# Patient Record
Sex: FEMALE | Race: WHITE | NOT HISPANIC OR LATINO | Employment: FULL TIME | URBAN - METROPOLITAN AREA
[De-identification: names, ages, dates, MRNs, and addresses within clinical notes are randomized per-mention and may not be internally consistent; named-entity substitution may affect disease eponyms.]

---

## 2017-01-11 ENCOUNTER — AMBULATORY - HEALTHEAST (OUTPATIENT)
Dept: ALLERGY | Facility: CLINIC | Age: 37
End: 2017-01-11

## 2017-01-11 DIAGNOSIS — J30.2 SEASONAL ALLERGIC RHINITIS, UNSPECIFIED ALLERGIC RHINITIS TRIGGER: ICD-10-CM

## 2017-01-12 ENCOUNTER — AMBULATORY - HEALTHEAST (OUTPATIENT)
Dept: ALLERGY | Facility: CLINIC | Age: 37
End: 2017-01-12

## 2017-01-12 DIAGNOSIS — J30.2 SEASONAL ALLERGIC RHINITIS, UNSPECIFIED ALLERGIC RHINITIS TRIGGER: ICD-10-CM

## 2017-02-15 ENCOUNTER — AMBULATORY - HEALTHEAST (OUTPATIENT)
Dept: ALLERGY | Facility: CLINIC | Age: 37
End: 2017-02-15

## 2017-02-15 DIAGNOSIS — J30.2 SEASONAL ALLERGIC RHINITIS, UNSPECIFIED ALLERGIC RHINITIS TRIGGER: ICD-10-CM

## 2017-03-01 ENCOUNTER — AMBULATORY - HEALTHEAST (OUTPATIENT)
Dept: ALLERGY | Facility: CLINIC | Age: 37
End: 2017-03-01

## 2017-03-01 DIAGNOSIS — J30.2 SEASONAL ALLERGIC RHINITIS, UNSPECIFIED ALLERGIC RHINITIS TRIGGER: ICD-10-CM

## 2017-03-13 ENCOUNTER — OFFICE VISIT - HEALTHEAST (OUTPATIENT)
Dept: ALLERGY | Facility: CLINIC | Age: 37
End: 2017-03-13

## 2017-03-13 ENCOUNTER — RECORDS - HEALTHEAST (OUTPATIENT)
Dept: ADMINISTRATIVE | Facility: OTHER | Age: 37
End: 2017-03-13

## 2017-03-13 DIAGNOSIS — J30.2 SEASONAL ALLERGIC RHINITIS, UNSPECIFIED ALLERGIC RHINITIS TRIGGER: ICD-10-CM

## 2017-03-13 DIAGNOSIS — J45.20 MILD INTERMITTENT ASTHMA WITHOUT COMPLICATION: ICD-10-CM

## 2017-04-04 ENCOUNTER — COMMUNICATION - HEALTHEAST (OUTPATIENT)
Dept: FAMILY MEDICINE | Facility: CLINIC | Age: 37
End: 2017-04-04

## 2017-05-01 ENCOUNTER — AMBULATORY - HEALTHEAST (OUTPATIENT)
Dept: ALLERGY | Facility: CLINIC | Age: 37
End: 2017-05-01

## 2017-05-01 DIAGNOSIS — J30.2 SEASONAL ALLERGIC RHINITIS, UNSPECIFIED ALLERGIC RHINITIS TRIGGER: ICD-10-CM

## 2017-05-15 ENCOUNTER — AMBULATORY - HEALTHEAST (OUTPATIENT)
Dept: ALLERGY | Facility: CLINIC | Age: 37
End: 2017-05-15

## 2017-05-15 ENCOUNTER — COMMUNICATION - HEALTHEAST (OUTPATIENT)
Dept: FAMILY MEDICINE | Facility: CLINIC | Age: 37
End: 2017-05-15

## 2017-05-15 DIAGNOSIS — Z31.89 ENCOUNTER FOR FERTILITY PLANNING: ICD-10-CM

## 2017-05-15 DIAGNOSIS — J30.2 SEASONAL ALLERGIC RHINITIS, UNSPECIFIED ALLERGIC RHINITIS TRIGGER: ICD-10-CM

## 2017-06-05 ENCOUNTER — AMBULATORY - HEALTHEAST (OUTPATIENT)
Dept: ALLERGY | Facility: CLINIC | Age: 37
End: 2017-06-05

## 2017-06-05 DIAGNOSIS — J30.2 SEASONAL ALLERGIC RHINITIS, UNSPECIFIED ALLERGIC RHINITIS TRIGGER: ICD-10-CM

## 2017-07-19 ENCOUNTER — AMBULATORY - HEALTHEAST (OUTPATIENT)
Dept: ALLERGY | Facility: CLINIC | Age: 37
End: 2017-07-19

## 2017-07-19 DIAGNOSIS — J30.2 SEASONAL ALLERGIC RHINITIS, UNSPECIFIED ALLERGIC RHINITIS TRIGGER: ICD-10-CM

## 2017-08-02 ENCOUNTER — AMBULATORY - HEALTHEAST (OUTPATIENT)
Dept: ALLERGY | Facility: CLINIC | Age: 37
End: 2017-08-02

## 2017-08-02 DIAGNOSIS — J30.2 SEASONAL ALLERGIC RHINITIS, UNSPECIFIED ALLERGIC RHINITIS TRIGGER: ICD-10-CM

## 2017-08-14 ENCOUNTER — COMMUNICATION - HEALTHEAST (OUTPATIENT)
Dept: FAMILY MEDICINE | Facility: CLINIC | Age: 37
End: 2017-08-14

## 2017-08-15 ENCOUNTER — RECORDS - HEALTHEAST (OUTPATIENT)
Dept: ADMINISTRATIVE | Facility: OTHER | Age: 37
End: 2017-08-15

## 2017-08-16 ENCOUNTER — COMMUNICATION - HEALTHEAST (OUTPATIENT)
Dept: FAMILY MEDICINE | Facility: CLINIC | Age: 37
End: 2017-08-16

## 2017-08-23 ENCOUNTER — OFFICE VISIT - HEALTHEAST (OUTPATIENT)
Dept: FAMILY MEDICINE | Facility: CLINIC | Age: 37
End: 2017-08-23

## 2017-08-23 DIAGNOSIS — Z13.1 DIABETES MELLITUS SCREENING: ICD-10-CM

## 2017-08-23 DIAGNOSIS — Z98.84 H/O GASTRIC BYPASS: ICD-10-CM

## 2017-08-23 DIAGNOSIS — F41.1 GENERALIZED ANXIETY DISORDER: ICD-10-CM

## 2017-08-23 DIAGNOSIS — Z00.00 ROUTINE GENERAL MEDICAL EXAMINATION AT A HEALTH CARE FACILITY: ICD-10-CM

## 2017-08-23 DIAGNOSIS — Z31.41 FERTILITY TESTING: ICD-10-CM

## 2017-08-23 DIAGNOSIS — Z13.220 SCREENING CHOLESTEROL LEVEL: ICD-10-CM

## 2017-08-23 DIAGNOSIS — Z23 ENCOUNTER FOR VACCINATION: ICD-10-CM

## 2017-08-23 DIAGNOSIS — Z12.4 CERVICAL CANCER SCREENING: ICD-10-CM

## 2017-08-23 LAB
CHOLEST SERPL-MCNC: 163 MG/DL
FASTING STATUS PATIENT QL REPORTED: YES
HDLC SERPL-MCNC: 75 MG/DL
LDLC SERPL CALC-MCNC: 77 MG/DL
TRIGL SERPL-MCNC: 56 MG/DL

## 2017-08-23 ASSESSMENT — MIFFLIN-ST. JEOR: SCORE: 1641.88

## 2017-08-28 LAB
HPV INTERPRETATION - HISTORICAL: NORMAL
HPV INTERPRETER - HISTORICAL: NORMAL

## 2017-08-30 ENCOUNTER — AMBULATORY - HEALTHEAST (OUTPATIENT)
Dept: ALLERGY | Facility: CLINIC | Age: 37
End: 2017-08-30

## 2017-08-30 DIAGNOSIS — J30.2 SEASONAL ALLERGIC RHINITIS, UNSPECIFIED ALLERGIC RHINITIS TRIGGER: ICD-10-CM

## 2017-08-31 LAB
BKR LAB AP ABNORMAL BLEEDING: NO
BKR LAB AP BIRTH CONTROL/HORMONES: NORMAL
BKR LAB AP CERVICAL APPEARANCE: NORMAL
BKR LAB AP GYN ADEQUACY: NORMAL
BKR LAB AP GYN INTERPRETATION: NORMAL
BKR LAB AP HPV REFLEX: NORMAL
BKR LAB AP LMP: NORMAL
BKR LAB AP PATIENT STATUS: NORMAL
BKR LAB AP PREVIOUS ABNORMAL: NORMAL
BKR LAB AP PREVIOUS NORMAL: 2013
HIGH RISK?: NO
PATH REPORT.COMMENTS IMP SPEC: NORMAL
RESULT FLAG (HE HISTORICAL CONVERSION): NORMAL

## 2017-09-28 ENCOUNTER — RECORDS - HEALTHEAST (OUTPATIENT)
Dept: MAMMOGRAPHY | Facility: CLINIC | Age: 37
End: 2017-09-28

## 2017-09-28 DIAGNOSIS — Z12.31 ENCOUNTER FOR SCREENING MAMMOGRAM FOR MALIGNANT NEOPLASM OF BREAST: ICD-10-CM

## 2017-10-09 ENCOUNTER — HOSPITAL ENCOUNTER (OUTPATIENT)
Dept: MAMMOGRAPHY | Facility: HOSPITAL | Age: 37
Discharge: HOME | End: 2017-10-09
Attending: FAMILY MEDICINE

## 2017-10-09 DIAGNOSIS — R92.8 ABNORMAL SCREENING MAMMOGRAM: ICD-10-CM

## 2017-10-11 ENCOUNTER — AMBULATORY - HEALTHEAST (OUTPATIENT)
Dept: ALLERGY | Facility: CLINIC | Age: 37
End: 2017-10-11

## 2017-10-11 ENCOUNTER — COMMUNICATION - HEALTHEAST (OUTPATIENT)
Dept: ADMINISTRATIVE | Facility: CLINIC | Age: 37
End: 2017-10-11

## 2017-10-11 DIAGNOSIS — J30.2 CHRONIC SEASONAL ALLERGIC RHINITIS, UNSPECIFIED TRIGGER: ICD-10-CM

## 2017-10-19 ENCOUNTER — AMBULATORY - HEALTHEAST (OUTPATIENT)
Dept: ALLERGY | Facility: CLINIC | Age: 37
End: 2017-10-19

## 2017-10-19 DIAGNOSIS — J30.2 CHRONIC SEASONAL ALLERGIC RHINITIS, UNSPECIFIED TRIGGER: ICD-10-CM

## 2017-10-30 ENCOUNTER — COMMUNICATION - HEALTHEAST (OUTPATIENT)
Dept: FAMILY MEDICINE | Facility: CLINIC | Age: 37
End: 2017-10-30

## 2017-10-30 DIAGNOSIS — Z34.90 PREGNANCY: ICD-10-CM

## 2017-11-01 ENCOUNTER — AMBULATORY - HEALTHEAST (OUTPATIENT)
Dept: ALLERGY | Facility: CLINIC | Age: 37
End: 2017-11-01

## 2017-11-01 ENCOUNTER — AMBULATORY - HEALTHEAST (OUTPATIENT)
Dept: LAB | Facility: CLINIC | Age: 37
End: 2017-11-01

## 2017-11-01 DIAGNOSIS — J30.2 CHRONIC SEASONAL ALLERGIC RHINITIS, UNSPECIFIED TRIGGER: ICD-10-CM

## 2017-11-01 DIAGNOSIS — Z34.90 PREGNANCY: ICD-10-CM

## 2017-11-02 ENCOUNTER — COMMUNICATION - HEALTHEAST (OUTPATIENT)
Dept: FAMILY MEDICINE | Facility: CLINIC | Age: 37
End: 2017-11-02

## 2017-11-15 ENCOUNTER — AMBULATORY - HEALTHEAST (OUTPATIENT)
Dept: ALLERGY | Facility: CLINIC | Age: 37
End: 2017-11-15

## 2017-11-15 DIAGNOSIS — J30.2 CHRONIC SEASONAL ALLERGIC RHINITIS, UNSPECIFIED TRIGGER: ICD-10-CM

## 2017-11-29 ENCOUNTER — AMBULATORY - HEALTHEAST (OUTPATIENT)
Dept: ALLERGY | Facility: CLINIC | Age: 37
End: 2017-11-29

## 2017-11-29 DIAGNOSIS — J30.2 CHRONIC SEASONAL ALLERGIC RHINITIS, UNSPECIFIED TRIGGER: ICD-10-CM

## 2017-12-28 ENCOUNTER — COMMUNICATION - HEALTHEAST (OUTPATIENT)
Dept: FAMILY MEDICINE | Facility: CLINIC | Age: 37
End: 2017-12-28

## 2017-12-28 DIAGNOSIS — F41.1 GENERALIZED ANXIETY DISORDER: ICD-10-CM

## 2018-01-03 ENCOUNTER — AMBULATORY - HEALTHEAST (OUTPATIENT)
Dept: ALLERGY | Facility: CLINIC | Age: 38
End: 2018-01-03

## 2018-01-03 DIAGNOSIS — J30.2 CHRONIC SEASONAL ALLERGIC RHINITIS, UNSPECIFIED TRIGGER: ICD-10-CM

## 2018-01-29 ENCOUNTER — AMBULATORY - HEALTHEAST (OUTPATIENT)
Dept: ALLERGY | Facility: CLINIC | Age: 38
End: 2018-01-29

## 2018-01-29 DIAGNOSIS — J30.2 CHRONIC SEASONAL ALLERGIC RHINITIS, UNSPECIFIED TRIGGER: ICD-10-CM

## 2018-03-12 ENCOUNTER — OFFICE VISIT - HEALTHEAST (OUTPATIENT)
Dept: ALLERGY | Facility: CLINIC | Age: 38
End: 2018-03-12

## 2018-03-12 DIAGNOSIS — J30.2 CHRONIC SEASONAL ALLERGIC RHINITIS, UNSPECIFIED TRIGGER: ICD-10-CM

## 2018-03-21 ENCOUNTER — AMBULATORY - HEALTHEAST (OUTPATIENT)
Dept: ALLERGY | Facility: CLINIC | Age: 38
End: 2018-03-21

## 2018-03-21 DIAGNOSIS — J30.2 CHRONIC SEASONAL ALLERGIC RHINITIS, UNSPECIFIED TRIGGER: ICD-10-CM

## 2018-08-10 ENCOUNTER — COMMUNICATION - HEALTHEAST (OUTPATIENT)
Dept: FAMILY MEDICINE | Facility: CLINIC | Age: 38
End: 2018-08-10

## 2018-08-10 DIAGNOSIS — F41.1 GENERALIZED ANXIETY DISORDER: ICD-10-CM

## 2018-10-25 ENCOUNTER — OFFICE VISIT - HEALTHEAST (OUTPATIENT)
Dept: FAMILY MEDICINE | Facility: CLINIC | Age: 38
End: 2018-10-25

## 2018-10-25 DIAGNOSIS — Z12.11 COLON CANCER SCREENING: ICD-10-CM

## 2018-10-25 DIAGNOSIS — F41.1 GENERALIZED ANXIETY DISORDER: ICD-10-CM

## 2018-10-25 DIAGNOSIS — F32.0 MILD MAJOR DEPRESSION (H): ICD-10-CM

## 2018-10-25 DIAGNOSIS — M79.675 PAIN OF TOE OF LEFT FOOT: ICD-10-CM

## 2018-10-25 DIAGNOSIS — J45.20 MILD INTERMITTENT ASTHMA WITHOUT COMPLICATION: ICD-10-CM

## 2018-10-25 DIAGNOSIS — Z12.31 VISIT FOR SCREENING MAMMOGRAM: ICD-10-CM

## 2018-11-06 ENCOUNTER — COMMUNICATION - HEALTHEAST (OUTPATIENT)
Dept: FAMILY MEDICINE | Facility: CLINIC | Age: 38
End: 2018-11-06

## 2018-11-06 DIAGNOSIS — F41.1 GENERALIZED ANXIETY DISORDER: ICD-10-CM

## 2018-12-26 ENCOUNTER — COMMUNICATION - HEALTHEAST (OUTPATIENT)
Dept: ADMINISTRATIVE | Facility: CLINIC | Age: 38
End: 2018-12-26

## 2019-01-07 ENCOUNTER — RECORDS - HEALTHEAST (OUTPATIENT)
Dept: GENERAL RADIOLOGY | Facility: CLINIC | Age: 39
End: 2019-01-07

## 2019-01-07 ENCOUNTER — OFFICE VISIT - HEALTHEAST (OUTPATIENT)
Dept: PODIATRY | Facility: CLINIC | Age: 39
End: 2019-01-07

## 2019-01-07 DIAGNOSIS — M79.672 LEFT FOOT PAIN: ICD-10-CM

## 2019-01-07 DIAGNOSIS — M79.672 PAIN IN LEFT FOOT: ICD-10-CM

## 2019-01-07 DIAGNOSIS — M89.30 HYPERTROPHY OF BONE: ICD-10-CM

## 2019-01-07 ASSESSMENT — MIFFLIN-ST. JEOR: SCORE: 1661.73

## 2019-01-29 ENCOUNTER — AMBULATORY - HEALTHEAST (OUTPATIENT)
Dept: VASCULAR SURGERY | Facility: CLINIC | Age: 39
End: 2019-01-29

## 2019-01-31 ENCOUNTER — COMMUNICATION - HEALTHEAST (OUTPATIENT)
Dept: ADMINISTRATIVE | Facility: CLINIC | Age: 39
End: 2019-01-31

## 2019-02-05 ENCOUNTER — OFFICE VISIT - HEALTHEAST (OUTPATIENT)
Dept: PODIATRY | Facility: CLINIC | Age: 39
End: 2019-02-05

## 2019-02-05 ENCOUNTER — HOSPITAL ENCOUNTER (OUTPATIENT)
Dept: MAMMOGRAPHY | Facility: CLINIC | Age: 39
Discharge: HOME | End: 2019-02-05
Attending: FAMILY MEDICINE

## 2019-02-05 DIAGNOSIS — L03.116 CELLULITIS OF LEFT FOOT: ICD-10-CM

## 2019-02-05 DIAGNOSIS — Z12.31 VISIT FOR SCREENING MAMMOGRAM: ICD-10-CM

## 2019-02-05 ASSESSMENT — MIFFLIN-ST. JEOR: SCORE: 1659.46

## 2019-03-01 ENCOUNTER — COMMUNICATION - HEALTHEAST (OUTPATIENT)
Dept: VASCULAR SURGERY | Facility: CLINIC | Age: 39
End: 2019-03-01

## 2019-03-25 ENCOUNTER — COMMUNICATION - HEALTHEAST (OUTPATIENT)
Dept: PODIATRY | Facility: CLINIC | Age: 39
End: 2019-03-25

## 2019-03-27 ENCOUNTER — OFFICE VISIT - HEALTHEAST (OUTPATIENT)
Dept: PODIATRY | Facility: CLINIC | Age: 39
End: 2019-03-27

## 2019-03-27 DIAGNOSIS — L02.612 ABSCESS OF LEFT FOOT: ICD-10-CM

## 2019-03-27 ASSESSMENT — MIFFLIN-ST. JEOR: SCORE: 1632.24

## 2019-03-29 LAB
BACTERIA SPEC CULT: NORMAL
GRAM STAIN RESULT: NORMAL

## 2019-04-03 ENCOUNTER — OFFICE VISIT - HEALTHEAST (OUTPATIENT)
Dept: PODIATRY | Facility: CLINIC | Age: 39
End: 2019-04-03

## 2019-04-03 DIAGNOSIS — L02.612 ABSCESS OF LEFT FOOT: ICD-10-CM

## 2019-04-03 ASSESSMENT — MIFFLIN-ST. JEOR: SCORE: 1632.24

## 2019-08-19 ENCOUNTER — OFFICE VISIT - HEALTHEAST (OUTPATIENT)
Dept: FAMILY MEDICINE | Facility: CLINIC | Age: 39
End: 2019-08-19

## 2019-08-19 DIAGNOSIS — F32.0 MILD MAJOR DEPRESSION (H): ICD-10-CM

## 2019-08-19 DIAGNOSIS — Z00.00 ROUTINE GENERAL MEDICAL EXAMINATION AT A HEALTH CARE FACILITY: ICD-10-CM

## 2019-08-19 DIAGNOSIS — Z98.84 GASTRIC BYPASS STATUS FOR OBESITY: ICD-10-CM

## 2019-08-19 DIAGNOSIS — F41.1 GENERALIZED ANXIETY DISORDER: ICD-10-CM

## 2019-08-19 LAB
ALBUMIN UR-MCNC: NEGATIVE MG/DL
APPEARANCE UR: CLEAR
BILIRUB UR QL STRIP: NEGATIVE
CHOLEST SERPL-MCNC: 200 MG/DL
COLOR UR AUTO: YELLOW
ERYTHROCYTE [DISTWIDTH] IN BLOOD BY AUTOMATED COUNT: 12.3 % (ref 11–14.5)
FASTING STATUS PATIENT QL REPORTED: YES
FASTING STATUS PATIENT QL REPORTED: YES
FERRITIN SERPL-MCNC: 6 NG/ML (ref 10–130)
GLUCOSE BLD-MCNC: 105 MG/DL (ref 70–99)
GLUCOSE UR STRIP-MCNC: NEGATIVE MG/DL
HCG UR QL: NEGATIVE
HCT VFR BLD AUTO: 39.6 % (ref 35–47)
HDLC SERPL-MCNC: 94 MG/DL
HGB BLD-MCNC: 12.7 G/DL (ref 12–16)
HGB UR QL STRIP: NEGATIVE
KETONES UR STRIP-MCNC: NEGATIVE MG/DL
LDLC SERPL CALC-MCNC: 94 MG/DL
LEUKOCYTE ESTERASE UR QL STRIP: NEGATIVE
MCH RBC QN AUTO: 28.6 PG (ref 27–34)
MCHC RBC AUTO-ENTMCNC: 32 G/DL (ref 32–36)
MCV RBC AUTO: 89 FL (ref 80–100)
NITRATE UR QL: NEGATIVE
PH UR STRIP: 7 [PH] (ref 5–8)
PLATELET # BLD AUTO: 209 THOU/UL (ref 140–440)
PMV BLD AUTO: 8.5 FL (ref 7–10)
RBC # BLD AUTO: 4.44 MILL/UL (ref 3.8–5.4)
SP GR UR STRIP: 1.01 (ref 1–1.03)
TRIGL SERPL-MCNC: 59 MG/DL
TSH SERPL DL<=0.005 MIU/L-ACNC: 1.18 UIU/ML (ref 0.3–5)
UROBILINOGEN UR STRIP-ACNC: NORMAL
VIT B12 SERPL-MCNC: 176 PG/ML (ref 213–816)
WBC: 7.2 THOU/UL (ref 4–11)

## 2019-08-19 ASSESSMENT — MIFFLIN-ST. JEOR: SCORE: 1677.6

## 2019-08-20 LAB
25(OH)D3 SERPL-MCNC: 24.9 NG/ML (ref 30–80)
25(OH)D3 SERPL-MCNC: 24.9 NG/ML (ref 30–80)

## 2019-09-26 ENCOUNTER — COMMUNICATION - HEALTHEAST (OUTPATIENT)
Dept: FAMILY MEDICINE | Facility: CLINIC | Age: 39
End: 2019-09-26

## 2019-09-30 ENCOUNTER — OFFICE VISIT - HEALTHEAST (OUTPATIENT)
Dept: FAMILY MEDICINE | Facility: CLINIC | Age: 39
End: 2019-09-30

## 2019-09-30 DIAGNOSIS — F32.0 MILD MAJOR DEPRESSION (H): ICD-10-CM

## 2019-09-30 DIAGNOSIS — F41.1 GENERALIZED ANXIETY DISORDER: ICD-10-CM

## 2019-09-30 DIAGNOSIS — D51.9 ANEMIA DUE TO VITAMIN B12 DEFICIENCY, UNSPECIFIED B12 DEFICIENCY TYPE: ICD-10-CM

## 2019-09-30 LAB
ALBUMIN SERPL-MCNC: 3.9 G/DL (ref 3.5–5)
ALP SERPL-CCNC: 59 U/L (ref 45–120)
ALT SERPL W P-5'-P-CCNC: 38 U/L (ref 0–45)
ANION GAP SERPL CALCULATED.3IONS-SCNC: 9 MMOL/L (ref 5–18)
AST SERPL W P-5'-P-CCNC: 26 U/L (ref 0–40)
BILIRUB SERPL-MCNC: 0.3 MG/DL (ref 0–1)
BUN SERPL-MCNC: 10 MG/DL (ref 8–22)
CALCIUM SERPL-MCNC: 9.5 MG/DL (ref 8.5–10.5)
CHLORIDE BLD-SCNC: 106 MMOL/L (ref 98–107)
CO2 SERPL-SCNC: 24 MMOL/L (ref 22–31)
CREAT SERPL-MCNC: 0.82 MG/DL (ref 0.6–1.1)
ERYTHROCYTE [DISTWIDTH] IN BLOOD BY AUTOMATED COUNT: 12 % (ref 11–14.5)
FERRITIN SERPL-MCNC: 13 NG/ML (ref 10–130)
GFR SERPL CREATININE-BSD FRML MDRD: >60 ML/MIN/1.73M2
GLUCOSE BLD-MCNC: 94 MG/DL (ref 70–125)
HCT VFR BLD AUTO: 38.2 % (ref 35–47)
HGB BLD-MCNC: 12.5 G/DL (ref 12–16)
MCH RBC QN AUTO: 29.2 PG (ref 27–34)
MCHC RBC AUTO-ENTMCNC: 32.7 G/DL (ref 32–36)
MCV RBC AUTO: 89 FL (ref 80–100)
PLATELET # BLD AUTO: 217 THOU/UL (ref 140–440)
PMV BLD AUTO: 8.6 FL (ref 7–10)
POTASSIUM BLD-SCNC: 4.9 MMOL/L (ref 3.5–5)
PROT SERPL-MCNC: 6.9 G/DL (ref 6–8)
RBC # BLD AUTO: 4.29 MILL/UL (ref 3.8–5.4)
SODIUM SERPL-SCNC: 139 MMOL/L (ref 136–145)
WBC: 7.4 THOU/UL (ref 4–11)

## 2019-10-08 ENCOUNTER — COMMUNICATION - HEALTHEAST (OUTPATIENT)
Dept: ADMINISTRATIVE | Facility: HOSPITAL | Age: 39
End: 2019-10-08

## 2019-10-09 ENCOUNTER — COMMUNICATION - HEALTHEAST (OUTPATIENT)
Dept: ADMINISTRATIVE | Facility: HOSPITAL | Age: 39
End: 2019-10-09

## 2019-10-09 ENCOUNTER — AMBULATORY - HEALTHEAST (OUTPATIENT)
Dept: PODIATRY | Facility: CLINIC | Age: 39
End: 2019-10-09

## 2019-10-09 ENCOUNTER — SURGERY - HEALTHEAST (OUTPATIENT)
Dept: PODIATRY | Facility: CLINIC | Age: 39
End: 2019-10-09

## 2019-10-09 ENCOUNTER — COMMUNICATION - HEALTHEAST (OUTPATIENT)
Dept: PODIATRY | Facility: CLINIC | Age: 39
End: 2019-10-09

## 2019-10-09 DIAGNOSIS — M89.30 HYPERTROPHY OF BONE: ICD-10-CM

## 2019-10-11 ENCOUNTER — COMMUNICATION - HEALTHEAST (OUTPATIENT)
Dept: FAMILY MEDICINE | Facility: CLINIC | Age: 39
End: 2019-10-11

## 2019-10-11 DIAGNOSIS — F41.1 GENERALIZED ANXIETY DISORDER: ICD-10-CM

## 2019-10-11 DIAGNOSIS — F32.0 MILD MAJOR DEPRESSION (H): ICD-10-CM

## 2019-10-28 ENCOUNTER — COMMUNICATION - HEALTHEAST (OUTPATIENT)
Dept: ADMINISTRATIVE | Facility: CLINIC | Age: 39
End: 2019-10-28

## 2019-10-28 ENCOUNTER — COMMUNICATION - HEALTHEAST (OUTPATIENT)
Dept: VASCULAR SURGERY | Facility: CLINIC | Age: 39
End: 2019-10-28

## 2019-10-28 DIAGNOSIS — Z12.11 SPECIAL SCREENING FOR MALIGNANT NEOPLASMS, COLON: ICD-10-CM

## 2019-12-20 ENCOUNTER — RECORDS - HEALTHEAST (OUTPATIENT)
Dept: GENERAL RADIOLOGY | Facility: CLINIC | Age: 39
End: 2019-12-20

## 2019-12-20 ENCOUNTER — OFFICE VISIT - HEALTHEAST (OUTPATIENT)
Dept: FAMILY MEDICINE | Facility: CLINIC | Age: 39
End: 2019-12-20

## 2019-12-20 DIAGNOSIS — S00.83XA: ICD-10-CM
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
## 2019-12-20 DIAGNOSIS — S01.25XA: ICD-10-CM

## 2019-12-20 DIAGNOSIS — S63.501A SPRAIN OF RIGHT WRIST, INITIAL ENCOUNTER: ICD-10-CM

## 2019-12-20 DIAGNOSIS — S69.91XA UNSPECIFIED INJURY OF RIGHT WRIST, HAND AND FINGER(S), INITIAL ENCOUNTER: ICD-10-CM

## 2020-01-16 ENCOUNTER — COMMUNICATION - HEALTHEAST (OUTPATIENT)
Dept: FAMILY MEDICINE | Facility: CLINIC | Age: 40
End: 2020-01-16

## 2020-01-16 DIAGNOSIS — F32.0 MILD MAJOR DEPRESSION (H): ICD-10-CM

## 2020-01-16 DIAGNOSIS — F41.1 GENERALIZED ANXIETY DISORDER: ICD-10-CM

## 2020-08-04 ENCOUNTER — OFFICE VISIT - HEALTHEAST (OUTPATIENT)
Dept: FAMILY MEDICINE | Facility: CLINIC | Age: 40
End: 2020-08-04

## 2020-08-04 DIAGNOSIS — F41.1 GENERALIZED ANXIETY DISORDER: ICD-10-CM

## 2020-08-04 DIAGNOSIS — F32.0 MILD MAJOR DEPRESSION (H): ICD-10-CM

## 2020-08-04 ASSESSMENT — ANXIETY QUESTIONNAIRES
GAD7 TOTAL SCORE: 11
IF YOU CHECKED OFF ANY PROBLEMS ON THIS QUESTIONNAIRE, HOW DIFFICULT HAVE THESE PROBLEMS MADE IT FOR YOU TO DO YOUR WORK, TAKE CARE OF THINGS AT HOME, OR GET ALONG WITH OTHER PEOPLE: VERY DIFFICULT
4. TROUBLE RELAXING: MORE THAN HALF THE DAYS
5. BEING SO RESTLESS THAT IT IS HARD TO SIT STILL: NOT AT ALL
2. NOT BEING ABLE TO STOP OR CONTROL WORRYING: SEVERAL DAYS
1. FEELING NERVOUS, ANXIOUS, OR ON EDGE: MORE THAN HALF THE DAYS
3. WORRYING TOO MUCH ABOUT DIFFERENT THINGS: MORE THAN HALF THE DAYS
7. FEELING AFRAID AS IF SOMETHING AWFUL MIGHT HAPPEN: SEVERAL DAYS
6. BECOMING EASILY ANNOYED OR IRRITABLE: NEARLY EVERY DAY

## 2020-08-04 ASSESSMENT — PATIENT HEALTH QUESTIONNAIRE - PHQ9: SUM OF ALL RESPONSES TO PHQ QUESTIONS 1-9: 10

## 2020-09-14 ENCOUNTER — OFFICE VISIT - HEALTHEAST (OUTPATIENT)
Dept: FAMILY MEDICINE | Facility: CLINIC | Age: 40
End: 2020-09-14

## 2020-09-14 DIAGNOSIS — R39.15 URINARY URGENCY: ICD-10-CM

## 2020-09-14 DIAGNOSIS — M54.50 BILATERAL LOW BACK PAIN WITHOUT SCIATICA, UNSPECIFIED CHRONICITY: ICD-10-CM

## 2020-09-14 LAB
ALBUMIN UR-MCNC: NEGATIVE MG/DL
APPEARANCE UR: CLEAR
BILIRUB UR QL STRIP: NEGATIVE
COLOR UR AUTO: YELLOW
GLUCOSE UR STRIP-MCNC: NEGATIVE MG/DL
HGB UR QL STRIP: NEGATIVE
KETONES UR STRIP-MCNC: NEGATIVE MG/DL
LEUKOCYTE ESTERASE UR QL STRIP: NEGATIVE
NITRATE UR QL: NEGATIVE
PH UR STRIP: 7 [PH] (ref 5–8)
SP GR UR STRIP: 1.01 (ref 1–1.03)
UROBILINOGEN UR STRIP-ACNC: NORMAL

## 2020-09-14 ASSESSMENT — MIFFLIN-ST. JEOR: SCORE: 1651.74

## 2020-11-16 ENCOUNTER — COMMUNICATION - HEALTHEAST (OUTPATIENT)
Dept: FAMILY MEDICINE | Facility: CLINIC | Age: 40
End: 2020-11-16

## 2020-11-17 ENCOUNTER — COMMUNICATION - HEALTHEAST (OUTPATIENT)
Dept: FAMILY MEDICINE | Facility: CLINIC | Age: 40
End: 2020-11-17

## 2020-11-17 DIAGNOSIS — F41.1 GENERALIZED ANXIETY DISORDER: ICD-10-CM

## 2020-11-17 DIAGNOSIS — F32.0 MILD MAJOR DEPRESSION (H): ICD-10-CM

## 2020-12-30 ENCOUNTER — COMMUNICATION - HEALTHEAST (OUTPATIENT)
Dept: FAMILY MEDICINE | Facility: CLINIC | Age: 40
End: 2020-12-30

## 2020-12-30 DIAGNOSIS — F32.0 MILD MAJOR DEPRESSION (H): ICD-10-CM

## 2020-12-30 DIAGNOSIS — F41.1 GENERALIZED ANXIETY DISORDER: ICD-10-CM

## 2021-01-04 ENCOUNTER — OFFICE VISIT - HEALTHEAST (OUTPATIENT)
Dept: FAMILY MEDICINE | Facility: CLINIC | Age: 41
End: 2021-01-04

## 2021-01-04 DIAGNOSIS — F41.1 GENERALIZED ANXIETY DISORDER: ICD-10-CM

## 2021-01-04 DIAGNOSIS — Z72.0 TOBACCO CONSUMPTION: ICD-10-CM

## 2021-01-04 DIAGNOSIS — F32.0 MILD MAJOR DEPRESSION (H): ICD-10-CM

## 2021-01-04 ASSESSMENT — ANXIETY QUESTIONNAIRES
5. BEING SO RESTLESS THAT IT IS HARD TO SIT STILL: NOT AT ALL
7. FEELING AFRAID AS IF SOMETHING AWFUL MIGHT HAPPEN: NOT AT ALL
4. TROUBLE RELAXING: NOT AT ALL
1. FEELING NERVOUS, ANXIOUS, OR ON EDGE: NOT AT ALL
3. WORRYING TOO MUCH ABOUT DIFFERENT THINGS: NOT AT ALL
6. BECOMING EASILY ANNOYED OR IRRITABLE: NOT AT ALL
GAD7 TOTAL SCORE: 1
2. NOT BEING ABLE TO STOP OR CONTROL WORRYING: SEVERAL DAYS
IF YOU CHECKED OFF ANY PROBLEMS ON THIS QUESTIONNAIRE, HOW DIFFICULT HAVE THESE PROBLEMS MADE IT FOR YOU TO DO YOUR WORK, TAKE CARE OF THINGS AT HOME, OR GET ALONG WITH OTHER PEOPLE: NOT DIFFICULT AT ALL

## 2021-01-04 ASSESSMENT — PATIENT HEALTH QUESTIONNAIRE - PHQ9: SUM OF ALL RESPONSES TO PHQ QUESTIONS 1-9: 1

## 2021-05-27 ASSESSMENT — PATIENT HEALTH QUESTIONNAIRE - PHQ9
SUM OF ALL RESPONSES TO PHQ QUESTIONS 1-9: 1
SUM OF ALL RESPONSES TO PHQ QUESTIONS 1-9: 10

## 2021-05-27 NOTE — PROGRESS NOTES
FOOT AND ANKLE SURGERY/PODIATRY Progress Note        ASSESSMENT:   Abscess left foot    HPI: Katarina Mcclain was seen again today planing of severe pain in the bottom of the left foot near the small toe.  The patient stated that while on her trip to [place] she developed a very large blister.  The blister did drain.  She noticed that some of the skin sloughed off.  She stated that approximately 2 days ago she began to have severe pain on the bottom of her left foot.  She is unable to weight-bear because of the pain.  She has not had any redness or swelling.  She has noticed some mild drainage on the bottom of her left foot.    Past Medical History:   Diagnosis Date     Anxiety      Asthma        Past Surgical History:   Procedure Laterality Date     AMADOU-EN-Y PROCEDURE         Allergies   Allergen Reactions     Other Drug Allergy (See Comments) Itching     Seasonal-ragweed, birch, dust mites     Oxycodone-Acetaminophen          Current Outpatient Medications:      citalopram (CELEXA) 20 MG tablet, Take 1 tablet (20 mg total) by mouth daily., Disp: 90 tablet, Rfl: 3     multivitamin therapeutic (THERAGRAN) tablet, Take 1 tablet by mouth daily., Disp: , Rfl:     Family History   Problem Relation Age of Onset     Thyroid disease Mother      Breast cancer Maternal Aunt      Breast cancer Maternal Aunt      Breast cancer Maternal Aunt        Social History     Socioeconomic History     Marital status: Single     Spouse name: Not on file     Number of children: Not on file     Years of education: Not on file     Highest education level: Not on file   Occupational History     Not on file   Social Needs     Financial resource strain: Not on file     Food insecurity:     Worry: Not on file     Inability: Not on file     Transportation needs:     Medical: Not on file     Non-medical: Not on file   Tobacco Use     Smoking status: Light Tobacco Smoker     Types: Cigarettes     Start date: 9/3/2014     Smokeless tobacco: Never  Used   Substance and Sexual Activity     Alcohol use: Yes     Comment: weekends     Drug use: No     Sexual activity: Not on file   Lifestyle     Physical activity:     Days per week: Not on file     Minutes per session: Not on file     Stress: Not on file   Relationships     Social connections:     Talks on phone: Not on file     Gets together: Not on file     Attends Yazidism service: Not on file     Active member of club or organization: Not on file     Attends meetings of clubs or organizations: Not on file     Relationship status: Not on file     Intimate partner violence:     Fear of current or ex partner: Not on file     Emotionally abused: Not on file     Physically abused: Not on file     Forced sexual activity: Not on file   Other Topics Concern     Not on file   Social History Narrative     Not on file       10 point Review of Systems is negative        Vitals:    03/27/19 1121   BP: 110/70   Pulse: 64   Resp: 20   Temp: 99.1  F (37.3  C)       BMI= Body mass index is 33.39 kg/m .    OBJECTIVE:  General appearance: Patient is alert and fully cooperative with history & exam.  No sign of distress is noted during the visit.  Vascular: Dorsalis pedis and posterior tibial pulses are palpable. There is pedal hair growth bilaterally.  CFT < 3 sec from anterior tibial surface to distal digits bilaterally. There is no appreciable edema noted.  Dermatologic: Turgor and texture are within normal limits. No coloration or temperature changes.  There is a large fluctuant necrotic mass sub-fifth metatarsal head left foot.  Exquisite pain on palpation.  No cellulitis noted left foot.   Neurologic: All epicritic and proprioceptive sensations are grossly intact bilaterally.  Musculoskeletal: All active and passive ankle, subtalar, midtarsal, and 1st MPJ range of motion are grossly intact without pain or crepitus, with the exception of none. Manual muscle strength is normal limits bilaterally. All dorsiflexors,  plantarflexors, invertors, evertors are intact bilaterally. Tenderness present to sub-fifth metatarsal head left foot on palpation. Tenderness to fifth metatarsal phalangeal joint left foot with range of motion. Calf is soft/non-tender without warmth/induration    Imaging:     No results found.         TREATMENT:  Performed incision and drainage of abscess left foot.  Wound cultures were obtained.  The patient was treated empirically with Keflex 500 milligrams 1 tab 4 times daily.  A sterile dressing was applied.  The patient was asked to remove the dressing tomorrow.  She is to soak her foot daily and Betadine solution of warm water.  She is to apply bacitracin and a small bandage.  The patient is to return to the clinic in 1 week.        Brendon Plummer; LEE  [facility] Foot & Ankle Surgery/Podiatry

## 2021-05-27 NOTE — PATIENT INSTRUCTIONS - HE
Remove bandages tomorrow.  Soak foot in Betadine solution and warm water.  Apply bacitracin and a small bandage.  Return to the clinic in 1 week for a follow-up visit.

## 2021-05-27 NOTE — PROGRESS NOTES
FOOT AND ANKLE SURGERY/PODIATRY Progress Note        ASSESSMENT:   Abscess left foot    HPI: Katarina Mcclain was seen again today abscess left foot.  Patient stated that she is markedly improved.  She has no pain.  She indicated that she did have some drainage for the first 4 days but the drainage subsided.  She is able to weight-bear without pain.  She has not noticed any additional redness or swelling.  Is completed a full course of Keflex.    Past Medical History:   Diagnosis Date     Anxiety      Asthma        Past Surgical History:   Procedure Laterality Date     AMADOU-EN-Y PROCEDURE         Allergies   Allergen Reactions     Other Drug Allergy (See Comments) Itching     Seasonal-ragweed, birch, dust mites     Oxycodone-Acetaminophen          Current Outpatient Medications:      cephalexin (KEFLEX) 500 MG capsule, Take 1 capsule (500 mg total) by mouth 4 (four) times a day for 7 days., Disp: 24 capsule, Rfl: 0     citalopram (CELEXA) 20 MG tablet, Take 1 tablet (20 mg total) by mouth daily., Disp: 90 tablet, Rfl: 3     multivitamin therapeutic (THERAGRAN) tablet, Take 1 tablet by mouth daily., Disp: , Rfl:     Family History   Problem Relation Age of Onset     Thyroid disease Mother      Breast cancer Maternal Aunt      Breast cancer Maternal Aunt      Breast cancer Maternal Aunt        Social History     Socioeconomic History     Marital status: Single     Spouse name: Not on file     Number of children: Not on file     Years of education: Not on file     Highest education level: Not on file   Occupational History     Not on file   Social Needs     Financial resource strain: Not on file     Food insecurity:     Worry: Not on file     Inability: Not on file     Transportation needs:     Medical: Not on file     Non-medical: Not on file   Tobacco Use     Smoking status: Light Tobacco Smoker     Types: Cigarettes     Start date: 9/3/2014     Smokeless tobacco: Never Used   Substance and Sexual Activity     Alcohol  use: Yes     Comment: weekends     Drug use: No     Sexual activity: Not on file   Lifestyle     Physical activity:     Days per week: Not on file     Minutes per session: Not on file     Stress: Not on file   Relationships     Social connections:     Talks on phone: Not on file     Gets together: Not on file     Attends Pentecostal service: Not on file     Active member of club or organization: Not on file     Attends meetings of clubs or organizations: Not on file     Relationship status: Not on file     Intimate partner violence:     Fear of current or ex partner: Not on file     Emotionally abused: Not on file     Physically abused: Not on file     Forced sexual activity: Not on file   Other Topics Concern     Not on file   Social History Narrative     Not on file       10 point Review of Systems is negative        Vitals:    04/03/19 0751   BP: 122/86   Pulse: 72   Resp: 20   Temp: 99  F (37.2  C)       BMI= Body mass index is 33.39 kg/m .    OBJECTIVE:  General appearance: Patient is alert and fully cooperative with history & exam.  No sign of distress is noted during the visit.  Vascular: Dorsalis pedis and posterior tibial pulses are palpable. There is pedal hair growth bilaterally.  CFT < 3 sec from anterior tibial surface to distal digits bilaterally. There is no appreciable edema noted.  Dermatologic: Turgor and texture are within normal limits. No coloration or temperature changes.  There is no edema, erythema, cellulitis noted fifth metatarsal head left foot.  No drainage noted.  No pain on palpation.  No primary or secondary lesions noted.  Neurologic: All epicritic and proprioceptive sensations are grossly intact bilaterally.  Musculoskeletal: All active and passive ankle, subtalar, midtarsal, and 1st MPJ range of motion are grossly intact without pain or crepitus, with the exception of none. Manual muscle strength is within normal limits bilaterally. All dorsiflexors, plantarflexors, invertors,  evertors are intact bilaterally.  No tenderness present to fifth metatarsal head left foot on palpation.  No tenderness to fifth metatarsal phalangeal joint left foot with range of motion. Calf is soft/non-tender without warmth/induration    Imaging:     No results found.         TREATMENT:  I informed the patient that she is completely healed.  There is no sign of infection.  She is to return to the clinic as needed.        Brendon Plummer; LEE  [facility] Foot & Ankle Surgery/Podiatry

## 2021-05-28 ASSESSMENT — ANXIETY QUESTIONNAIRES
GAD7 TOTAL SCORE: 1
GAD7 TOTAL SCORE: 11

## 2021-05-31 VITALS — BODY MASS INDEX: 33.29 KG/M2 | HEIGHT: 67 IN | WEIGHT: 212.13 LBS

## 2021-06-01 NOTE — PROGRESS NOTES
ASSESSMENT/PLAN:  Anemia due to vitamin B12 deficiency, 2/2 malabsorption, s/p gastric bypass  Unable to tolerate oral intake of ferrous sulfate.  Will refer to hematology  -     HM2(CBC w/o Differential)  -     Ferritin  -     Comprehensive Metabolic Panel  -     Ambulatory referral to Oncology/Hematology Adult    B12 deficiency  Started taking B12 sublingual daily    Vitamin D insufficiency  Advised adding Vitamin D 1000iu daily    Mild major depression (H)  Increase dose to 30mg  Continue with psychotherapy  Motivational interviewing was utilized today.  Modified cognitive behavioral therapy was performed with counseling on internal locus of control.  -     citalopram (CELEXA) 20 MG tablet; Take 1.5 tablets (30 mg total) by mouth daily.    Generalized anxiety disorder  Increase dose to 30mg  Continue with psychotherapy  Motivational interviewing was utilized today.  Modified cognitive behavioral therapy was performed with counseling on internal locus of control.  -     citalopram (CELEXA) 20 MG tablet; Take 1.5 tablets (30 mg total) by mouth daily.    Other orders  -     Influenza,Seasonal,Quad,INJ =/>6months    Orders Placed This Encounter   Procedures     Influenza,Seasonal,Quad,INJ =/>6months     HM2(CBC w/o Differential)     Ferritin     Comprehensive Metabolic Panel     Ambulatory referral to Oncology/Hematology Adult     Referral Priority:   Routine     Referral Type:   Consultation     Referral Reason:   Evaluation and Treatment     Requested Specialty:   Oncology     Number of Visits Requested:   1       CHIEF COMPLAINT:  Chief Complaint   Patient presents with     Follow Up     talk about side effects from depo, bleeding for 26 days now,  discuss iron levels, would like to have labs drawn        HISTORY OF PRESENT ILLNESS:  Katarina is a 39 y.o. female presenting to the clinic today to check her iron levels due to excessive bleeding.     Iron Levels: She got the depo shot on 8/19/19. She got her period  "when she normally should, however, she has not stopped bleeding since. It has been about 26 days. She uses a pad to catch the blood. The bleeding has not been heavy every day that she has had it. She recently started a B12 vitamin. She is worried about her iron levels. She would like to skip her next depo shot. She is not sexually active anymore.     Depression: She has started to feel down and depressed again. She describes it as a \"I don't really care\" mood lately. She has been more than she was in August as well. This has been impacting her ability to properly care for herself. She decided to start seeing therapist again. She had her first \"in-take\" appointment with her therapist today. She has been taking 20mg citalopram.     REVIEW OF SYSTEMS:   Constitutional: Negative.   HENT: Negative.   Eyes: Negative.   Respiratory: Negative.   Cardiovascular: Negative.   Gastrointestinal: Negative.   Endocrine: Negative.   Genitourinary: Negative.   Musculoskeletal: Negative.   Skin: Negative.   Allergic/Immunologic: Negative.   Neurological: Negative.   Hematological: Negative.   Psychiatric/Behavioral: Negative.   All other systems are negative.      TOBACCO USE:  Social History     Tobacco Use   Smoking Status Light Tobacco Smoker     Types: Cigarettes     Start date: 9/3/2014   Smokeless Tobacco Never Used       VITALS:  Vitals:    09/30/19 1354   BP: 108/62   Temp: 98.8  F (37.1  C)   TempSrc: Oral   Weight: 212 lb 4.8 oz (96.3 kg)     Wt Readings from Last 3 Encounters:   09/30/19 212 lb 4.8 oz (96.3 kg)   08/19/19 220 lb (99.8 kg)   04/03/19 210 lb (95.3 kg)       PHYSICAL EXAM:  Constitutional: Patient is oriented to person, place, and time. Patient appears well-developed and well-nourished. No distress.   Head: Normocephalic and atraumatic.   Right Ear: External ear normal.   Left Ear: External ear normal.   Nose: Nose normal.   Eyes: Conjunctivae and EOM are normal. Right eye exhibits no discharge. Left eye " exhibits no discharge. No scleral icterus.   Neurological: Patient is alert and oriented to person, place, and time. No cranial nerve deficit. Coordination normal.   Skin: No rash noted. Patient is not diaphoretic. No erythema. No pallor.    QUALITY MEASURES:  The following are part of a depression follow up plan for the patient:  under care of mental health counselor, case management follow-up and patient follow-up to return when and if necessary    ADDITIONAL HISTORY SUMMARIZED (2): None.  DECISION TO OBTAIN EXTRA INFORMATION (1): None.   RADIOLOGY TESTS (1): None.  LABS (1): Reviewed labs from 8/19/19 and ordered labs today.  MEDICINE TESTS (1): None.  INDEPENDENT REVIEW (2 each): None.     Payal CHENG,  am scribing for and in the presence of, Dr. Nelson.    I, Dr. Nelson, personally performed the services described in this documentation, as scribed by Payal Lanza in my presence, and it is both accurate and complete.    MEDICATIONS:  Current Outpatient Medications   Medication Sig Dispense Refill     citalopram (CELEXA) 20 MG tablet Take 1.5 tablets (30 mg total) by mouth daily. 90 tablet 0     multivitamin therapeutic (THERAGRAN) tablet Take 1 tablet by mouth daily.       No current facility-administered medications for this visit.        Total data points: 1

## 2021-06-01 NOTE — TELEPHONE ENCOUNTER
Left message to call back for: Katarina   Information to relay to patient:  Dr. Nelson would like to see pt for follow up on depo shot and to discuss iron infusions. Please help schedule.     REN Castano

## 2021-06-02 ENCOUNTER — RECORDS - HEALTHEAST (OUTPATIENT)
Dept: ADMINISTRATIVE | Facility: CLINIC | Age: 41
End: 2021-06-02

## 2021-06-02 VITALS — HEIGHT: 67 IN | WEIGHT: 210 LBS | BODY MASS INDEX: 32.96 KG/M2

## 2021-06-02 VITALS — WEIGHT: 216.5 LBS | BODY MASS INDEX: 34.42 KG/M2

## 2021-06-02 VITALS — WEIGHT: 216.5 LBS | HEIGHT: 67 IN | BODY MASS INDEX: 33.98 KG/M2

## 2021-06-02 VITALS — BODY MASS INDEX: 33.9 KG/M2 | WEIGHT: 216 LBS | HEIGHT: 67 IN

## 2021-06-02 NOTE — TELEPHONE ENCOUNTER
Dr. Nelson    A colonoscopy was ordered for Katarina in October 2018 but was never scheduled.    Is she a candidate for cologuard?    Lizeth

## 2021-06-02 NOTE — PROGRESS NOTES
Surgery/Procedure: Partial Phalengectomy, third left toe    Special Equipment: TBD    Location: [facility]    Date: 11/08/19    Time: 8:30am    Surgeon: Dr. Plummer    Assist: No    Length of Surgery: 90 minutes    OR Confirmed/ :  Yes joselito Scott on 10/0/19    Orders In:  Yes    Provider Team Notified:  Yes    Entered on [employer] Calendar:  Yes    Post Op: 11/18/19 and 11/25/19 @ [facility]

## 2021-06-02 NOTE — TELEPHONE ENCOUNTER
Medication Question or Clarification  Who is calling: Patient  What medication are you calling about? (include dose and sig) Citalopram 20 mg, take 1 and 1/2 tablet daily  Who prescribed the medication?: Emy Martin MD   What is your question/concern?: This dose is working well for her.  Could she get prescription for 135 tablets with additional refill sent to her pharmacy?  Pharmacy: [facility] #[id number]  Okay to leave a detailed message?: Yes  Site CMT - Please call the pharmacy to obtain any additional needed information.

## 2021-06-02 NOTE — TELEPHONE ENCOUNTER
She would like to reschedule the surgery she had to cancel back in March. Please enter new surgery order.    Arthroplasty, left toe

## 2021-06-02 NOTE — TELEPHONE ENCOUNTER
Pt called today to inform the podiatry staff she would like to cancel her upcoming surgery with Dr. Plummer, as well as the post op visits. Pt stated there is no need to reschedule the surgery right now.

## 2021-06-03 VITALS
BODY MASS INDEX: 33.75 KG/M2 | DIASTOLIC BLOOD PRESSURE: 62 MMHG | TEMPERATURE: 98.8 F | WEIGHT: 212.3 LBS | SYSTOLIC BLOOD PRESSURE: 108 MMHG

## 2021-06-03 VITALS — WEIGHT: 220 LBS | BODY MASS INDEX: 34.53 KG/M2 | HEIGHT: 67 IN

## 2021-06-04 VITALS
BODY MASS INDEX: 34.34 KG/M2 | DIASTOLIC BLOOD PRESSURE: 84 MMHG | RESPIRATION RATE: 18 BRPM | SYSTOLIC BLOOD PRESSURE: 116 MMHG | OXYGEN SATURATION: 100 % | TEMPERATURE: 98.4 F | WEIGHT: 216 LBS | HEART RATE: 78 BPM
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
## 2021-06-05 VITALS
WEIGHT: 217.8 LBS | SYSTOLIC BLOOD PRESSURE: 124 MMHG | HEIGHT: 66 IN | DIASTOLIC BLOOD PRESSURE: 80 MMHG | BODY MASS INDEX: 35 KG/M2 | OXYGEN SATURATION: 98 % | HEART RATE: 92 BPM

## 2021-06-05 NOTE — TELEPHONE ENCOUNTER
Refill Approved    Rx renewed per Medication Renewal Policy. Medication was last renewed on 10/14/19.    Kelly Gonzalez, [facility] Triage/Med Refill 1/17/2020     Requested Prescriptions   Pending Prescriptions Disp Refills     citalopram (CELEXA) 20 MG tablet [Pharmacy Med Name: CITALOPRAM 20MG TABLETS] 135 tablet 0     Sig: TAKE 1 1/2 TABLETS(30MG TOTAL) BY MOUTH DAILY       SSRI Refill Protocol  Passed - 1/16/2020 10:11 AM        Passed - PCP or prescribing provider visit in last year     Last office visit with prescriber/PCP: 9/30/2019 Emy Martin MD OR same dept: 9/30/2019 Emy Martin MD OR same specialty: 9/30/2019 Emy Martin MD  Last physical: 8/19/2019 Last MTM visit: Visit date not found   Next visit within 3 mo: Visit date not found  Next physical within 3 mo: Visit date not found  Prescriber OR PCP: Emy Saleh MD  Last diagnosis associated with med order: 1. Generalized anxiety disorder  - citalopram (CELEXA) 20 MG tablet [Pharmacy Med Name: CITALOPRAM 20MG TABLETS]; TAKE 1 1/2 TABLETS(30MG TOTAL) BY MOUTH DAILY  Dispense: 135 tablet; Refill: 0    2. Mild major depression (H)  - citalopram (CELEXA) 20 MG tablet [Pharmacy Med Name: CITALOPRAM 20MG TABLETS]; TAKE 1 1/2 TABLETS(30MG TOTAL) BY MOUTH DAILY  Dispense: 135 tablet; Refill: 0    If protocol passes may refill for 12 months if within 3 months of last provider visit (or a total of 15 months).

## 2021-06-08 NOTE — PROGRESS NOTES
MM/JAVIER/DPM/D/RW  1:1 V/V EXP 1/12/2018  POLLENS/CAT  1:1 V/V EXP 1/12/2018    HE MID  CHECKED BY CATHY  CHARGED 20 UNITS

## 2021-06-09 NOTE — PROGRESS NOTES
Assessment:     Allergic rhinitis with specific sensitivity to tree pollen, weed pollen, cat, dog, dust mite and mold  Oral allergy syndrome that is improved.  Intermittent asthma  On immunotherapy and doing well with it. Overall allergies much improved.     Plan:     Continue 1:1 extract 0.5 mL dosing. Patient has completed 4 years. Goal to complete 5 years. We'll review at next year's appointment  Continue albuterol as needed  Continue antihistamines as needed  Follow-up in one year for recheck. Sooner with new concerns  ____________________________________________________________________________                                                             Katarina returns today for annual follow-up of allergy shots.  She has been on allergy shots for 4 years.  She does have some breakthrough symptoms but overall allergies are much improved.  She reports this winter having increased allergy symptoms.  She is using antihistamines intermittently.  She has a history of intermittent asthma, but has not needed albuterol frequently.  No report of systemic reactions from her allergy shot.    Physical Exam:  General:  Alert and oriented.  Eyes:  Sclera clear. Nose: pale boggy mucosal membranes.  Throat:  pink moist, no lesions.  Lungs:  clear to auscultation    Spirometry: FEV1 to FVC ratio is 81%.  FEV1 is 3.04 L which is 91% predicted.  FVC is 3.74 L which is 92% of predicted.  This is normal spirometry    This transcription uses voice recognition software, which may contain typographical errors.

## 2021-06-10 NOTE — PROGRESS NOTES
"Katarina Mcclain is a 40 y.o. female who is being evaluated via a billable video visit.      The patient has been notified of following:     \"This video visit will be conducted via a call between you and your physician/provider. We have found that certain health care needs can be provided without the need for an in-person physical exam.  This service lets us provide the care you need with a video conversation.  If a prescription is necessary we can send it directly to your pharmacy.  If lab work is needed we can place an order for that and you can then stop by our lab to have the test done at a later time.    Video visits are billed at different rates depending on your insurance coverage. Please reach out to your insurance provider with any questions.    If during the course of the call the physician/provider feels a video visit is not appropriate, you will not be charged for this service.\"    Patient has given verbal consent to a Video visit? Yes  How would you like to obtain your AVS? AVS Preference: MyChart.    Will anyone else be joining your video visit? No        Video Start Time: 12:57 PM    Additional provider notes:   Katarina Mcclain 40 y.o.. female with   Chief Complaint   Patient presents with     med check     Medication Refill     Celexa         S:    Has been on furloughed 2 months ago and stopped taking celexa  Thinks she may need to get back on it  Internalizing everything  Feeling irritated  Has appt with therapist later this week      Little interest or pleasure in doing things: Several days  Feeling down, depressed, or hopeless: Several days  Trouble falling or staying asleep, or sleeping too much: More than half the days  Feeling tired or having little energy: Several days  Poor appetite or overeating: Several days  Feeling bad about yourself - or that you are a failure or have let yourself or your family down: More than half the days  Trouble concentrating on things, such as reading the newspaper " or watching television: More than half the days  Moving or speaking so slowly that other people could have noticed. Or the opposite - being so fidgety or restless that you have been moving around a lot more than usual: Not at all  Thoughts that you would be better off dead, or of hurting yourself in some way: Not at all  PHQ-9 Total Score: 10  If you checked off any problems, how difficult have these problems made it for you to do your work, take care of things at home, or get along with other people?: Somewhat difficult    CARLOS-7 Screening Results:  Feeling nervous, anxious or on edge: 2 (2020 12:00 PM)  Not being able to stop or control worry: 1 (2020 12:00 PM)  Worrying too much about different things: 2 (2020 12:00 PM)  Trouble relaxin (2020 12:00 PM)  Being so restless that is is hard to sit still: 0 (2020 12:00 PM)  Becoming easily annnoyed or irritable: 3 (2020 12:00 PM)  Feeling afraid as if something awful might happen: 1 (2020 12:00 PM)  CARLOS-7 Total: 11 (2020 12:00 PM)  How difficult did these problems make it for you to do your work, take care of things at home or get along with other people? : Very difficult (2020 12:00 PM)  How difficult did these problems make it for you to do your work, take care of things at home or get along with other people? : Very difficult (2020 12:00 PM)      O:  Constitutional: Patient is oriented to person, place, and time. Patient appears well-developed and well-nourished. No distress.   Head: Normocephalic and atraumatic.   Right Ear: External ear normal.   Left Ear: External ear normal.   Nose: Nose normal.   Eyes: Conjunctivae and EOM are normal. Right eye exhibits no discharge. Left eye exhibits no discharge. No scleral icterus.   Neurological: Patient is alert and oriented to person, place, and time.   The patient has good eye contact.  No psychomotor retardation or stereotypical behaviors.  Speech was regular rate, regular  rhythm, adequate responses.  Mood was stable and affect was congruent mood.  No suicidal or homicidal intent.  No hallucination.      A/P:  Diagnoses and all orders for this visit:    Generalized anxiety disorder, Mild major depression (H)  Restart celexa  Hydroxyzine prn  Continue with psychotherapy  Motivational interviewing was utilized today.  Modified cognitive behavioral therapy was performed with counseling on internal locus of control.   F/u in 1 month  -     citalopram (CELEXA) 20 MG tablet; Take 1 tablet (20 mg total) by mouth daily.  Dispense: 90 tablet; Refill: 0      Video-Visit Details    Type of service:  Video Visit    Video End Time (time video stopped): 1:14 PM  Originating Location (pt. Location): Home    Distant Location (provider location):  [facility]     Platform used for Video Visit: Nazanin Saleh MD

## 2021-06-11 NOTE — PROGRESS NOTES
ASSESSMENT:  1. Bilateral low back pain without sciatica, unspecified chronicity    2. Urinary urgency    Urine looks normal, so I believe that this is most likely a musculoskeletal back issue.  She does do a lot of leaning over and using her back in ways that are not terribly conducive to good back health.  We talked about doing some stretching and doing some Antoni's back exercises.  I suggested some Tylenol ibuprofen as needed.  She can also use some heat and various forms to see if that makes a difference.  If she is not getting better after that, we could consider some physical therapy but she will let us know.      - Urinalysis-UC if Indicated          PLAN:  There are no Patient Instructions on file for this visit.    Orders Placed This Encounter   Procedures     Urinalysis-UC if Indicated     There are no discontinued medications.    No follow-ups on file.    CHIEF COMPLAINT:  Chief Complaint   Patient presents with     Back Pain     Low back pain - started last Thursday - has never had this before - Feels like she is having bad period cramps but her period is not due.        HISTORY OF PRESENT ILLNESS:  Katarina is a 40 y.o. female presenting to the clinic today with lower back pain.  She states that she has had some back pain now for about 4 days.  It really seemed to come on pretty suddenly.  She does do a lot of lifting from a bent over perspective and lifting things up with a hunched over back rather than lifting with her legs and she acknowledges that that might be catching up to her at this point.  She does not know of one specific injury that might have led to this pain.  She is wondering about urine issues because she does feel the pain radiating around to the front sometimes.  That she has had no urinary frequency or urgency or hematuria.  She does feel like sometimes they are related to menstrual Cramps but she is not due for her cycle at this point.    REVIEW OF SYSTEMS:     All other systems are  "negative.    PFSH:    Reviewed      TOBACCO USE:  Social History     Tobacco Use   Smoking Status Light Tobacco Smoker     Types: Cigarettes     Start date: 9/3/2014   Smokeless Tobacco Never Used       VITALS:  Vitals:    09/14/20 1541   BP: 124/80   Patient Site: Left Arm   Patient Position: Sitting   Cuff Size: Adult Regular   Pulse: 92   SpO2: 98%   Weight: 217 lb 12.8 oz (98.8 kg)   Height: 5' 5.5\" (1.664 m)     Wt Readings from Last 3 Encounters:   09/14/20 217 lb 12.8 oz (98.8 kg)   12/20/19 216 lb (98 kg)   09/30/19 212 lb 4.8 oz (96.3 kg)     Body mass index is 35.69 kg/m .    PHYSICAL EXAM:  Constitutional:  Well appearing patient in no acute distress.  Vitals:  Per nursing notes.    HEENT:  Normocephalic, atraumatic.  Ears are clear bilaterally, with no fluid or redness, and landmarks visible.  Pupils are equal and reactive to light, extraocular muscles intact, visual fields are full.  Nose is normal, and oropharynx is clear without redness.    Neck is without lymphadenopathy.    Lungs:  Clear to auscultation bilaterally without wheezes, rales or rhonchi.   Cardiac:  Regular rate and rhythm without murmurs, rubs, or gallops.     She has decreased range of motion to flexion and lateral flexion.  She has pain to palpation of the lower paraspinal musculature.  No sciatic notch tenderness.  Negative straight leg raise.  Good strength sensation reflexes in all 4 extremities.    MEDICATIONS:  Current Outpatient Medications   Medication Sig Dispense Refill     citalopram (CELEXA) 20 MG tablet Take 1 tablet (20 mg total) by mouth daily. 90 tablet 0     hydrOXYzine pamoate (VISTARIL) 50 MG capsule Take 1 capsule (50 mg total) by mouth 3 (three) times a day as needed for anxiety. 30 capsule 0     multivitamin therapeutic (THERAGRAN) tablet Take 1 tablet by mouth daily.       No current facility-administered medications for this visit.           "

## 2021-06-13 NOTE — TELEPHONE ENCOUNTER
Last office 8-4-20 for the medication. Last dispensed 8-4-20 and was to follow up in 1 month. Left message to call back to schedule a medication check.

## 2021-06-13 NOTE — PROGRESS NOTES
MM/DFM/DPM/D/RW  1:1 V/V EXP 10/19/18  POLLENS/CAT  1:1 V/V EXP 10/19/18    HE MID  CHECKED BY ES  CHARGED 20 UNITS

## 2021-06-14 NOTE — PROGRESS NOTES
Katarina Mcclain is a 40 y.o. female who is being evaluated via a billable video visit.      How would you like to obtain your AVS? MyChart.    Will anyone else be joining your video visit? No      Video Start Time: 9:04 AM  Assessment & Plan     Katarina was seen today for med check and medication refill.    Diagnoses and all orders for this visit:    Mild major depression (H)  -     citalopram (CELEXA) 20 MG tablet; Take 1 tablet (20 mg total) by mouth daily.  Refilled  F/u yearly    Generalized anxiety disorder  -     citalopram (CELEXA) 20 MG tablet; Take 1 tablet (20 mg total) by mouth daily.  Refilled  F/u yearly    Tobacco Cessation:   reports that she has been smoking cigarettes. She started smoking about 6 years ago. She has never used smokeless tobacco.  I have counseled the patient for tobacco cessation and the follow up will occur  when she's ready.   Motivational interviewing was utilized today.  Modified cognitive behavioral therapy was performed with counseling on internal locus of control.  Discussed importance of self care, sleep, nutrition, hydration, exercise, and mindfulness     Emy Saleh MD  [facility]     Katarina Mcclain is 40 y.o. and presents to clinic today for the following health issues   HPI     Doing well  Not seeing therapist  Taking celexa 20mg and prn hydroxyzine    Little interest or pleasure in doing things: Not at all  Feeling down, depressed, or hopeless: Not at all  Trouble falling or staying asleep, or sleeping too much: Several days  Feeling tired or having little energy: Not at all  Poor appetite or overeating: Not at all  Feeling bad about yourself - or that you are a failure or have let yourself or your family down: Not at all  Trouble concentrating on things, such as reading the newspaper or watching television: Not at all  Moving or speaking so slowly that other people could have noticed. Or the opposite -  being so fidgety or restless that you have been moving around a lot more than usual: Not at all  Thoughts that you would be better off dead, or of hurting yourself in some way: Not at all  PHQ-9 Total Score: 1  If you checked off any problems, how difficult have these problems made it for you to do your work, take care of things at home, or get along with other people?: Not difficult at all    CARLOS-7 Screening Results:  Feeling nervous, anxious or on edge: 0 (2021  8:00 AM)  Not being able to stop or control worry: 1 (2021  8:00 AM)  Worrying too much about different things: 0 (2021  8:00 AM)  Trouble relaxin (2021  8:00 AM)  Being so restless that is is hard to sit still: 0 (2021  8:00 AM)  Becoming easily annnoyed or irritable: 0 (2021  8:00 AM)  Feeling afraid as if something awful might happen: 0 (2021  8:00 AM)  CARLOS-7 Total: 1 (2021  8:00 AM)  How difficult did these problems make it for you to do your work, take care of things at home or get along with other people? : Not difficult at all (2021  8:00 AM)  How difficult did these problems make it for you to do your work, take care of things at home or get along with other people? : Not difficult at all (2021  8:00 AM)          Objective       Vitals:  No vitals were obtained today due to virtual visit.    Physical Exam  Alert, oriented, NAD  The patient has good eye contact.  No psychomotor retardation or stereotypical behaviors.  Speech was regular rate, regular rhythm, adequate responses.  Mood was stable and affect was congruent mood.  No suicidal or homicidal intent.  No hallucination.          Video-Visit Details    Type of service:  Video Visit    Video End Time (time video stopped): 9:10 AM  Originating Location (pt. Location): Home    Distant Location (provider location):  [facility]     Platform used for Video Visit: Other: Mommy Nearest

## 2021-06-14 NOTE — TELEPHONE ENCOUNTER
Refill request for medication: Citalopram  Last visit addressing this medication: 8/4/2020  Follow up plan 1  months  Last refill on 11/18/2020, quantity #30   CSA completed N/A   checked  N/A    Appointment: Appointment scheduled for 1/4/2020     Pt is completely out of medication and in need of a refill right away.    Kristi Romero, CMA

## 2021-06-16 PROBLEM — M89.30 HYPERTROPHY OF BONE: Status: ACTIVE | Noted: 2019-10-09

## 2021-06-16 PROBLEM — F32.0 MILD MAJOR DEPRESSION (H): Status: ACTIVE | Noted: 2018-10-25

## 2021-06-16 NOTE — PROGRESS NOTES
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  Intermittent asthma with rare symptoms        Plan:      Continue 1:1 extract 0.5 mL dosing.  Additional 3 doses and then stop  Continue albuterol as needed  Continue antihistamines as needed  Follow-up as needed.  ____________________________________________________________________________     Katarina comes in today for annual follow-up of allergy shots.  She has been on allergy shots for 5 years.  Since last seen she is done well.  She has minimal allergy symptoms.  She no longer uses allergy medication.  She has not used albuterol for the past 2 years.  No report of systemic reactions to allergy shot.      Physical Exam:  General:  Alert and Oriented.  Eyes:  Sclera clear. Nose: pale boggy mucosal membranes.  Throat:  pink moist, no lesions.  Lungs:  clear to auscultation. Skin:  no rashes    15 min spent in direct contact with the patient.  More than 50% in counseling and coordination of care.

## 2021-06-17 NOTE — PATIENT INSTRUCTIONS - HE
Patient Instructions by Osito Rogers MD at 12/20/2019  9:40 AM     Author: Osito Rogers MD Service: -- Author Type: Physician    Filed: 12/20/2019 11:55 AM Encounter Date: 12/20/2019 Status: Addendum    : Osito Rogers MD (Physician)    Related Notes: Original Note by Osito Rogers MD (Physician) filed at 12/20/2019 11:54 AM       -X-rays of your right wrist show no broken bones.  -Wear the wrist brace provided today as needed for support.  -Ice your right wrist for 20 minutes at least 4 times per day until pain and swelling improved.  -Take acetaminophen as needed for wrist pain.  -Take Augmentin as directed to prevent infection of the bite wound on your nose.  -Take a probiotic while taking this antibiotic to help prevent diarrhea.  -Cleanse your wounds twice daily with warm soapy water.  You may cover the open area with a small amount of antibiotic ointment or white petrolatum until it has healed.  -Apply ice to your left brow for 20 minutes at least 4 times per day until pain and swelling improved.  Patient Education     Wrist Sprain  A sprain is an injury to the ligaments or capsule that holds a joint together. There are no broken bones. Most sprains take about 3 to 6 weeks to heal. If it a severe sprain where the ligament is completely torn, it can take months to recover.     Most wrist sprains are treated with a splint, wrist brace, or elastic wrap for support. Severe sprains may require surgery.  Home care    Keep your arm elevated to reduce pain and swelling. This is very important during the first 48 hours.    Apply an ice pack over the injured area for 15 to 20 minutes every 3 to 6 hours. You should do this for the first 24 to 48 hours. You can make an ice pack by filling a plastic bag that seals at the top with ice cubes and then wrapping it with a thin towel. Continue to use ice packs for relief of pain and swelling as needed. As the ice melts, be careful to avoid getting  your wrap, splint, or cast wet. After 48 hours, apply heat (warm shower or warm bath) for 15 to 20 minutes several times a day, or alternate ice and heat.     You may use over-the-counter pain medicine to control pain, unless another pain medicine was prescribed. If you have chronic liver or kidney disease or ever had a stomach ulcer or GI bleeding, talk with your doctor before using these medicines.    If you were given a splint or brace, wear it for the time advised by your doctor.  Follow-up care  Follow up with your healthcare provider as advised. Any X-rays you had today dont show any broken bones, breaks, or fractures. Sometimes fractures dont show up on the first X-ray. Bruises and sprains can sometimes hurt as much as a fracture. These injuries can take time to heal completely. If your symptoms dont improve or they get worse, talk with your doctor. You may need a repeat X-ray. If X-rays were taken, you will be told of any new findings that may affect your care.  When to seek medical advice  Call your healthcare provider right away if any of these occur:    Pain or swelling increases    Fingers or hand becomes cold, blue, numb, or tingly  Date Last Reviewed: 11/20/2015 2000-2017 [employer]. [address] [place] [address], [place] [address]. All rights reserved. This information is not intended as a substitute for professional medical care. Always follow your healthcare professional's instructions.           Patient Education     Human Bite  The mouth has bacteria (germs) that can cause a very severe infection. If the tooth of another person has cut your skin, there is a chance of a serious infection developing within the first few days. Bites to the hand are especially prone to infection of the skin (such as cellulitis). Diseases (such as hepatitis B or C, or herpes simplex virus) may also be transmitted through human bites.  Human bite wounds may be either sutured closed or left open to heal  depending on location, length of time since the bite, severity, signs of infection, and other concerns. Your doctor may want to do blood tests, a wound culture, X-ray, ultrasound, or others. Your doctor will explain if you need any of these and discuss your results.  Home care  The following will help you care for your wound at home:  1. Most skin wounds heal within 10 days. However, a human bite wound has a higher risk of getting infected. Look at the bite area each day for the next 4 days for signs of infection (listed below).  2. For certain types of wounds, an antibiotic will be prescribed. This will depend on several factors such as severity, surrounding structure injury, depth, location, and others. Take all antibiotics and medicines as directed until they are all gone.  3. If the bite is on the hand, arm, foot, or leg, limit the use of that extremity and keep it elevated for the first 24 hours.  4. You may be given a tetanus shot if needed.  5. Don't suck on the wound. This may introduce more bacteria.  Follow-up care  Follow up with your healthcare provider, or this facility as directed.  When to seek medical advice  Call your healthcare provider right away if you have any of these:    Spreading redness    Increased pain or swelling    Fever of 100.4  F (38  C) or higher, or as directed by your healthcare provider    Colored fluid or pus draining from the wound    Any signs of nerve or tendon damage, such as inability to bend a joint or feel an area of skin  Date Last Reviewed: 6/1/2016 2000-2017 [employer]. [address] [place] [address], [place] [address]. All rights reserved. This information is not intended as a substitute for professional medical care. Always follow your healthcare professional's instructions.           Patient Education     Facial Contusion  A contusion is another word for a bruise. It happens when small blood vessels break open and leak blood into the nearby area. A facial  "contusion can result from a bump, hit, or fall. This may happen during sports or an accident. Symptoms of a contusion often include changes in skin color (bruising), swelling, and pain.   The swelling from the contusion should decrease in a few days. Bruising and pain may take several weeks to go away.   Home care    If you have been prescribed medicines for pain, take them as directed.    To help reduce swelling and pain, wrap a cold pack or bag of frozen peas in a thin towel. Put it on the injured area for up to 20 minutes. Do this a few times a day until the swelling goes down.     If you have scrapes or cuts on your face requiring stiches or other closures, care for them as directed.    For the next 24 hours (or longer if instructed):  ? Dont drink alcohol, or use sedatives or medicines that make you sleepy.  ? Dont drive or operate machinery.  ? Don't do anything strenuous. Dont lift or strain.  ? Don't return to sports or other activity that could result in another head injury.  Note about concussions  Because the injury was to your head, it is possible that a concussion (mild brain injury) could result. Symptoms of a concussion can show up later. Be alert for signs and symptoms of a concussion. Seek emergency medical care if any of these develop over the next hours to days:    Headache    Nausea or vomiting    Dizziness    Sensitivity to light or noise    Unusual sleepiness or grogginess    Trouble falling asleep    Personality changes    Vision changes    Memory loss    Confusion    Trouble walking or clumsiness    Loss of consciousness (even for a short time)    Inability to be awakened    Feeling \"off\" or slow as if in a daze   Follow-up care  Follow up with your healthcare provider, or as directed.  When to seek medical advice  Call your healthcare provider right away if any of these occur:    Swelling or pain that gets worse, not better    New swelling or pain    Warmth or drainage from the swollen " area or from cuts or scrapes    Fluid drainage or bleeding from the nose or ears    Fever of 100.4 F (38 C) or higher, or as directed by your healthcare provider  Call 911  Call 911 if any of the following occur:     Repeated vomiting    Unusual drowsiness or trouble awakening    Fainting or loss of consciousness    Seizure    Worsening confusion, memory loss, dizziness, headache, behavior, speech, or vision  Date Last Reviewed: 5/1/2017 2000-2017 [employer]. [address] [place] [address]. All rights reserved. This information is not intended as a substitute for professional medical care. Always follow your healthcare professional's instructions.

## 2021-06-19 NOTE — LETTER
Letter by Daryl Dean MD at      Author: Daryl Dean MD Service: -- Author Type: --    Filed:  Encounter Date: 10/9/2019 Status: Signed                   Office Hours: Monday - Friday 8:00 - 4:30PM    Katarina Mcclain  [address] [facility] [address]           October 9, 2019      Dear Katarina:    Our clinic records indicate we have attempted to contact you three (3) or more times to schedule a follow up appointment. Unfortunately, we have been unable to reach you. To prevent further delays in your care, please contact our office to schedule your appointment.         Sincerely,        [facility]

## 2021-06-20 NOTE — LETTER
Letter by Osito Rogers MD at      Author: Osito Rogers MD Service: -- Author Type: --    Filed:  Encounter Date: 12/20/2019 Status: Signed         December 20, 2019     Patient: Katarina Mcclain   YOB: 1980   Date of Visit: 12/20/2019       To Whom it May Concern:    Katarina Mcclain was seen in my clinic on 12/20/2019.  Please excuse her absence from work today (12/20/2019) due to injury/illness.    If you have any questions or concerns, please don't hesitate to call.    Sincerely,         Electronically signed by Osito Rogers MD

## 2021-06-21 NOTE — PROGRESS NOTES
ASSESSMENT/PLAN:  Pain of toe of left foot  Callous on medial side of 3rd toe of left foot  Advised home treatment  If not better, then call for podiatry referral    Visit for screening mammogram  -     Mammo Screening Bilateral; Future    Colon cancer screening  H/o polyps  -     Ambulatory referral for Colonoscopy    Generalized anxiety disorder, Mild major depression (H)  Stable with citalopram 20 mg  PHQ9=3, GAD7=0    Mild intermittent asthma without complication  ACT=25    SUBJECTIVE:    Katarina Mcclain is a 38 y.o. female who came in today     3 weeks of pain of the third toe of the left foot.  Pain is worse when she wears her shoes.  Pain is not present when she does not wear her shoes.  She denies any injury.  She did have surgery on the same toe about 6 years ago.  She is wondering if a pin has been displaced.  No swelling or bruising.    Patient had a colonoscopy about 5 years ago and polyps were discovered.  She is due for repeat colonoscopy.  She has not had any change in gastrointestinal symptoms.    The patient has depression and anxiety that are under good control with citalopram 20 mg.  Patient denies score is that of 3 and mayra 7 score is that of 0     Patient has mild intermittent asthma that is under good control with as needed albuterol.  She has not used her albuterol for over a year now.  Asthma control test is 25    Review of Systems (except those mentioned above)  Constitutional: Negative.   HENT: Negative.   Eyes: Negative.   Respiratory: Negative.   Cardiovascular: Negative.   Gastrointestinal: Negative.   Endocrine: Negative.   Genitourinary: Negative.   Musculoskeletal: Negative.   Skin: Negative.   Allergic/Immunologic: Negative.   Neurological: Negative.   Hematological: Negative.   Psychiatric/Behavioral: Negative.     Patient Active Problem List    Diagnosis Date Noted     Mild major depression (H) 10/25/2018     Scheuermann's kyphosis 05/17/2016     Pars defect of lumbar spine  05/17/2016     Allergies      Vitamin D Deficiency      Anemia      Generalized Anxiety Disorder      Oral Allergy Syndrome      Asthma      Allergic Rhinitis      Vitamin B12 Deficiency      Benign Polyps Of The Large Intestine      Allergies   Allergen Reactions     Other Drug Allergy (See Comments) Itching     Seasonal-ragweed, birch, dust mites     Oxycodone-Acetaminophen      Current Outpatient Prescriptions   Medication Sig Dispense Refill     citalopram (CELEXA) 20 MG tablet Take 1 tablet (20 mg total) by mouth daily. 90 tablet 0     multivitamin therapeutic (THERAGRAN) tablet Take 1 tablet by mouth daily.       No current facility-administered medications for this visit.      Past Medical History:   Diagnosis Date     Anxiety      Asthma      Past Surgical History:   Procedure Laterality Date     AMADOU-EN-Y PROCEDURE       Social History     Social History     Marital status: Single     Spouse name: N/A     Number of children: N/A     Years of education: N/A     Social History Main Topics     Smoking status: Light Tobacco Smoker     Start date: 9/3/2014     Smokeless tobacco: Never Used     Alcohol use Yes     Drug use: None     Sexual activity: Not Asked     Other Topics Concern     None     Social History Narrative     Family History   Problem Relation Age of Onset     Thyroid disease Mother      Breast cancer Maternal Aunt      Breast cancer Maternal Aunt      Breast cancer Maternal Aunt          OBJECTIVE:    Vitals:    10/25/18 1527   BP: 130/80   Patient Site: Left Arm   Patient Position: Sitting   Cuff Size: Adult Regular   Pulse: 72   Weight: 216 lb 8 oz (98.2 kg)     Body mass index is 34.42 kg/(m^2).    Physical Exam:  Constitutional: Patient is oriented to person, place, and time. Patient appears well-developed and well-nourished. No distress.   Head: Normocephalic and atraumatic.   Right Ear: External ear normal.   Left Ear: External ear normal.   Nose: Nose normal.   Eyes: Conjunctivae and EOM  are normal. Right eye exhibits no discharge. Left eye exhibits no discharge. No scleral icterus.   Neurological: Patient is alert and oriented to person, place, and time. Patient has normal reflexes. No cranial nerve deficit. Coordination normal.   Skin: No rash noted. Patient is not diaphoretic. No erythema. No pallor.   left foot, 3rd toe:  Callous on the medial aspect between 3rd & 4th toe.  Tenderness to palpation along the callous

## 2021-06-22 NOTE — PATIENT INSTRUCTIONS - HE
Surgery Information    Surgery Date JUANI WILL CALL YOU         ( Arrive at the hospital one and a half hours prior to your surgery and 1 hour prior at the surgery center. Check in at the . The only exception is if you are scheduled for surgery at 7am, you should arrive at 5:30am) The nurse will call you a couple of day before surgery go with the time the nurse tells you.  All surgery times are estimated please go with what the nurse tells you when she calls.  Emergency's do happen and surgery times do get changed the nurse will let you know.  We give you the best estimate of time that we can at the time.      IF YOU NEED TO RESCHEDULE OR CANCEL YOUR SURGERY FOR ANY REASON PLEASE CALL THE CLINIC AS SOON AS POSSIBLE [phone].    Admission Type: Outpatient    Surgeon: Dr. Plummer    Surgery Procedure: ARTHROPLASTY, TOE      Surgery Location: [facility],[address] [place] [address], FAX [phone]    Additional Information: If you use a CPAP machine for sleep apnea please bring it with you for surgery. We will want to monitor your breathing using your normal equipment.     HOSPITAL AND SURGERY CENTER INFORMATION    We need to know a lot of information about you before surgery.     1-5 Days Before:     A nurse will call you for a pre-screening interview. The phone call with the nurse will be much faster and easier if you  Have organized your information prior to the call.   This is when you will be told when to show up and what to bring.    Please have the following information available:    Preoperative Exam completed and faxed to our office  has to be within 30 days will not except 31 days.    Primary care provider's and any specialty providers' contact information available. .    If requested by your primary care provider, have any heart and lung exams at least 3 days before surgery.    Have a complete and accurate list of medications available.     Have a list of your  allergies/sensitivities and reactions    Know your health history, surgical history, and medical problems    Know any anesthesia issues with you or within your family.     BE SURE TO NOTIFY US IF YOU ARE TAKING ANY BLOOD THINNING AGENTS: Coumadin, Plavix, Aspirin, Xarelto, Eliquis    Someone plan to bring you and stay with you after the surgery for 24 hours    Day Before Surgery    1. STOP Smoking and Drinking: It is important to stop smoking and drinking at least 24 hours before surgery.  Smoking and drinking may cause complications in your recovery from anesthesia and may lengthen the healing process.    Please bring with you the day of surgery      List of medication    Eyeglass case or contact case with solution. You cannot wear contacts during surgery    Copy of Health Care Directives, Living Will or Power of     Insurance Cards    Photo ID    3. NOTHING BY MOUTH 8 HOURS BEFORE. This includes gum, hard candy, water and mints. The only exception is if you have been instructed by your doctor to take your medications with sips of water. You may rinse your mouth or brush your teeth, but do not swallow water.     4. Remove Nail Polish on fingers as well as toes  Day of Surgery    1. Medications- Take as indicated with sips of water     2. Wear comfortable loose fitting clothes. Wear your glasses-Not contacts. Do not wear jewelry and remove body piercing's. Surgery may be cancelled if they are not removed.     3. If same day surgery-Have a someone come with you to surgery that can help you understand the surgeon's instructions, drive you home and stay with you overnight the first night.     4. A nurse will call you at home within 72 hours following surgery to see how you are doing. Our nurses and doctors will discuss recovery with you.   POST-OPERATIVE CARE INSTRUCTIONS    After surgery  You will have swelling in the foot, and pain from the incision. The swelling is related to the increased blood flow to the  "foot in response to the \"surgical injury\" as well as the decreased capacity of blood to return to the heart due to less lower leg muscle contractions (which have the effect of increasing venous blood return). Swelling is often directly proportional to the pain. The greatest swelling occurs in the first 3-4 days after surgery . After that the swelling gradually diminishes. However, it often takes many months (or even a year for major surgery) for all of the swelling to resolve. The pain associated with swelling can vary widely. If it is not managed appropriately it can be very uncomfortable and frustrating.      1. Following surgery, go directly home and elevate your foot. Elevate your foot about 6 inches about the level of you hip by placing pillows under your foot and leg. Do not sit with your foot down, dangling or with your feet crossed.      2. Rest as often as possible with your foot elevated to reduce the occurrence of swelling and associated pain. Reserve walking for using the restroom and getting food/beverages.     3. Place ice over foot/ ankle area or behind the knee (if casted) for 20 minutes of each hour for 24 hours. Ice should NEVER be used when the foot is numb from a nerve block as this can easily lead to frostbite.     4. Use pain medications as prescribed with food. Once home, take pain medication and do the same at supper time and bedtime. The pain medication and ice should help to \"control\" your pain, however, it may NOT take away all of the pain.      Take or ask for pain relief medication when pain begins. It is easier to prevent or relieve pain before it becomes too bad.     Some activities may make pain worse. Take your pain medicine first.     Your doctor and nurse use a 0-10 pain rating scale to report your pain. 0= no pain and 10 = the worst possible pain. Reporting a number helps us to understand how well your pain control plan is working and if your pain control plan needs changes. "      5. Keep your bandages clean and dry while the incision heals. A small amount of bleeding is normal. DO NOT CHANGE THE DRESSING! If your bandages become damp call our office immediately. Options to keep this area dry when bathing include:      SPONGE BATHS    PLASTIC BAG WITH TAPE OR CAST BAG     SHOWER CHAIR OR HANDHELD SHOWER HEAD    DANGLING YOUR LEG/FOOT OVER THE EDGE OF THE TUB     6. Exercise your legs frequently by bending your knees to stimulate circulation and help aid in healing.     7. If given a post operative shoe or cast boot wear at all times when walking. You may remove the post-operative shoe or cast boot at bedtime. If the cast boot becomes too heavy or painful it may be removed only while the foot is elevated and not bearing weight.      8. DO NOT operate heavy equipment, drive a vehicle for 24 hours after surgery and while taking pain medications. You may resume driving when you feel you can do so safely.     CALL IMMEDIATELY IF:    THE BANDAGES ARE SOAKED FROM BLOOD, DRAINAGE OR WATER    YOUR MEDICATION DOES NOT MANAGE THE DISCOMFORT    YOU INJURE YOUR FOOT    YOU DEVELOP A FEVER    THE BANDAGES BECOME TOO TIGHT OR YOUR TOES BECOME NUMB (after 24 hours you may cut your bandage 1 inch from the toe area and ankle area this will relieve pain)    PLEASE CALL THE [facility] PODIATRY LINE [phone] WITH ANY QUESTION OR CONCERNS. IF CALLING AFTER REGULAR BUSINESS HOURS THE PHYSICIAN ON CALL WILL BE PAGED TO RETURN YOUR CALL.    The doctor will need to see you for 1-3 post operative appointments depending on your surgery and recovery. Please ensure these are scheduled before your surgery or immediately after surgery.

## 2021-06-22 NOTE — PROGRESS NOTES
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 *      Chief Complaint:  Painful third toe left foot     HPI:    Katarina Mcclain is a 38 y.o. old female who presented to the clinic today complaining of severe pain involving the third toe left foot.  She has had this pain for several years.  It has gotten progressively worse.  It is a sharp burning pain with weightbearing and ambulation.  Her toe is aggravated by certain shoes.  She denies any trauma to it.  She has had hammertoe correction performed on this particular toe.  The only factor which gives her relief is nonweightbearing.  She has tried trimming the painful corn on the third toe periodically.    History is provided by patient     Medical History  Active Ambulatory (Non-Hospital) Problems    Diagnosis     Mild major depression (H)     Scheuermann's kyphosis     Pars defect of lumbar spine     Allergies     Vitamin D Deficiency     Anemia     Generalized Anxiety Disorder     Oral Allergy Syndrome     Asthma     Allergic Rhinitis     Vitamin B12 Deficiency     Benign Polyps Of The Large Intestine     Past Medical History:   Diagnosis Date     Anxiety      Asthma      Patient Active Problem List    Diagnosis Date Noted     Mild major depression (H) 10/25/2018     Scheuermann's kyphosis 05/17/2016     Pars defect of lumbar spine 05/17/2016     Allergies      Vitamin D Deficiency      Anemia       "Generalized Anxiety Disorder      Oral Allergy Syndrome      Asthma      Allergic Rhinitis      Vitamin B12 Deficiency      Benign Polyps Of The Large Intestine      Surgical History  She  has a past surgical history that includes Rubén-en-y procedure.   Past Surgical History:   Procedure Laterality Date     RUBÉN-EN-Y PROCEDURE      Social History  Reviewed, and she  reports that she has been smoking.  She started smoking about 4 years ago. she has never used smokeless tobacco. She reports that she drinks alcohol.  Social History     Tobacco Use     Smoking status: Light Tobacco Smoker     Start date: 9/3/2014     Smokeless tobacco: Never Used   Substance Use Topics     Alcohol use: Yes      Allergies  Allergies   Allergen Reactions     Other Drug Allergy (See Comments) Itching     Seasonal-ragweed, birch, dust mites     Oxycodone-Acetaminophen     Family History  Reviewed, and family history includes Breast cancer in her maternal aunt, maternal aunt, and maternal aunt; Thyroid disease in her mother.   Psychosocial Needs  Social History     Social History Narrative     Not on file     Additional psychosocial needs reviewed per nursing assessment.       Prior to Admission Medications     (Not in a hospital admission)        Review of Systems - Negative    /78 (Patient Site: Left Arm, Patient Position: Sitting, Cuff Size: Adult Large)   Pulse 76   Ht 5' 6.5\" (1.689 m)   Wt 216 lb 8 oz (98.2 kg)   BMI 34.42 kg/m      Objective findings: General: The patient is alert and in no acute distress     Integument: Nails bilateral feet are normal length and color.  Skin bilaterally warm supple and intact.  There is a thick hyperkeratotic nucleated lesion on the lateral aspect of the proximal interphalangeal joint third toe left foot.  There is pain on palpation of this lesion.  There is a medial deviation of the third toe left foot at the level of the proximal interphalangeal joint.  There is a well-healed surgical " incision on the dorsal aspect of the second and third toes left foot.       Vascular: DP and PT pulses +2/4 bilateral feet.  Capillary refill less than 2 seconds bilaterally.     Neurologic: Negative clonus, negative Babinski bilateral feet.     Musculoskeletal: Range of motion within normal limits bilateral feet.  Muscle power +5/5 bilaterally within all compartments.      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  I informed the patient that there is some hypertrophy of the distal aspect of the proximal phalanx which is responsible for this recurring painful corn.  I have recommended a partial phalangectomy of the third toe left foot in an attempt to realign the toe to prevent recurrence of the painful corn.  The patient was told the procedure would be done on an outpatient basis under local anesthesia with IV sedation.  She was told to proceed with take approximately 10 minutes to perform.  She would then be discharged weightbearing in a postop shoe for 3 weeks.  She was told that a pin will be placed in the toe to maintain correction for the first 3 weeks.  She was asked to go n.p.o. at midnight prior to the procedure.  She was asked to see her primary care physician for preoperative consultation. I have recommended orthotics.

## 2021-06-23 NOTE — PROGRESS NOTES
Surgery/Procedure: Arthroplasty toe left 3rd toe    Special Equipment: to be determined    Location: [facility]    Date: 03/22/19    Time: 7am     Surgeon: Dr. Plummer    Assist: no    Length of Surgery: 90 min     OR Confirmed/ :  Yes with zoa  on 01/30/19    Orders In:  Yes    Provider Team Notified:  Yes    Entered on [employer] Calendar:  Yes    Post Op: 03/28 and 4/4/19 @ [facility]

## 2021-06-23 NOTE — PROGRESS NOTES
Subjective findings: The patient presented to the clinic today complaining of a very painful left foot.  The patient stated that she has developed this pain over the past 24-48 hours.  She admits to applying a callus remover on a painful callus on the bottom of her left foot.  She stated that during the night she noticed she started to develop severe pain.  Pain is located in the area at the pad was applied.  The pain has increased.  She has also developed some swelling.  She is finding it difficult to weight-bear without pain.  She did try elevating her foot which gave her some mild relief.  She has not noticed any drainage or bleeding.  She denied any other previous treatment.    Objective findings: The patient is alert and is in severe pain left foot.  Skin bilaterally warm and intact.  There is a hyperkeratotic nucleated lesion sub-fifth metatarsal head left foot.  There is moderate edema and erythema surrounding the fifth metatarsal phalangeal joint left foot.  There is exquisite pain on palpation of the hyperkeratotic lesion on the plantar aspect of the left foot.  No active drainage or bleeding noted.  The skin appears to be intact.  DP and PT pulses are palpable bilaterally.  Capillary refill less than 2 seconds bilaterally.  Negative clonus, negative Babinski bilateral feet.  Range of motion and muscle power within normal limits bilaterally.    Assessment: Cellulitis left foot.    Plan: I informed the patient that she has a chemical burn from the application of the salicylic acid in an attempt to treat her callus.  I recommended that she DC the salicylic acid application.  She is to take OTC anti-inflammatory medication I recommended 600 mg of ibuprofen 3 times daily.  If her pain persist beyond 1week recommend the patient return to the clinic for follow-up visit.  The patient was also placed in a postoperative shoe today to assist with ambulation.

## 2021-06-23 NOTE — TELEPHONE ENCOUNTER
Please reschedule surgery with Dr. Plummer from 03/22/19 to 03/19/19. Detailed message on cell ok to confirm.

## 2021-06-27 NOTE — PROGRESS NOTES
Progress Notes by Emy Martin MD at 8/19/2019  9:00 AM     Author: Emy Martin MD Service: -- Author Type: Physician    Filed: 8/19/2019 10:29 AM Encounter Date: 8/19/2019 Status: Signed    : Emy Martin MD (Physician)       FEMALE PREVENTATIVE EXAM    Assessment and Plan:     Routine general medical examination at a health care facility  -     Glucose  -     Lipid Cascade  -     Thyroid Walker  -     Urinalysis  We discussed healthy lifestyle, nutrition, cardiovascular risk reduction, self care, safety, sunscreen, seatbelt, and timing of cancer screening.  Health maintenance screening and immunizations reviewed with the patient.     Gastric bypass status for obesity  -     HM2(CBC w/o Differential)  -     Ferritin  -     Vitamin B12  -     Vitamin D, Total (25-Hydroxy)  Advised daily B12, vitamin D, and multivitamins    BMI 34.0-34.9,adult  S/p tova-en-y surgery  Counseled healthy lifestyle modifications    Contraception  Spoke about various forms of birth control from oral, transdermal, intravaginal, intramuscular, subdermal, intrauterine.  Also spoke about hormonal versus nonhormonal form of birth control.  Patient verbalized interest in the depo provera.  We spoke about the risks and benefit, and smoking cessation.  Patient verbalized understanding and agree with plan  -     Pregnancy (Beta-hCG, Qual), Urine  -     medroxyPROGESTERone injection 150 mg (DEPO-PROVERA)    Generalized anxiety disorder, Mild major depression (H)  Stable with celexa 20mg  F/u yearly      Next follow up:  Return in about 1 year (around 8/19/2020).    Immunization Review  Adult Imm Review: No immunizations due today  Documented tobacco use.  Website and phone contact for [employer]uitPlan given to patient in AVS. and cessation counseled    I discussed the following with the patient:   Adult Healthy Living: Importance of regular exercise  Healthy nutrition      Subjective:   Chief  Complaint: Katarina Mcclain is an 39 y.o. female here for a preventative health visit.     HPI: The patient met a new partner and is interested in contraception.  She has a remote history of use of the oral contraceptive pills and Depo-Provera.  She met a new partner and has become sexually active.  Last menstrual period was July 29, 2019.  Last unprotected intercourse was a couple weeks ago.  The patient is a smoker and smokes about 6 cigarettes a day.  No family history of thromboembolism.  She had gastric bypass in 2006.    Depression anxiety symptoms are stable on citalopram 20 mg.  PHQ 9 score of 1 and myara 7 score of 2.  Patient does not have asthma.    Healthy Habits  Are you taking a daily aspirin? No  Do you typically exercising at least 40 min, 3-4 times per week?  NO  Do you usually eat at least 4 servings of fruit and vegetables a day, include whole grains and fiber and avoid regularly eating high fat foods? NO  Have you had an eye exam in the past two years? Yes  Do you see a dentist twice per year? Yes  Do you have any concerns regarding sleep? No    Safety Screen  If you own firearms, are they secured in a locked gun cabinet or with trigger locks? The patient does not own any firearms  No data recorded    Review of Systems:  Please see above.  The rest of the review of systems are negative for all systems.     Pap History:   Yes - updated in Problem List and Health Maintenance accordingly  Cancer Screening       Status Date      PAP SMEAR Next Due 8/23/2022      Done 8/23/2017 GYNECOLOGIC CYTOLOGY (PAP SMEAR)     Patient has more history with this topic...          Patient Care Team:  Emy Martin MD as PCP - General (Family Medicine)        History     Reviewed By Date/Time Sections Reviewed    Karsten Gutierrez CMA 8/19/2019  9:04 AM Tobacco            Objective:   Vital Signs:   Visit Vitals  /78 (Patient Site: Left Arm, Patient Position: Sitting, Cuff Size: Adult Regular)  "  Pulse 68   Ht 5' 6.5\" (1.689 m)   Wt 220 lb (99.8 kg)   LMP 07/29/2019   SpO2 100%   BMI 34.98 kg/m           PHYSICAL EXAM    Objective:    Physical Exam   Vitals:    08/19/19 0904   BP: 118/78   Pulse: 68   SpO2: 100%      Constitutional: Patient is oriented to person, place, and time. Patient appears well-developed and well-nourished. No distress.   Head: Normocephalic and atraumatic.   Right Ear: External ear normal. Normal TM  Left Ear: External ear normal. Normal TM  Nose: Nose normal.   Mouth/Throat: Oropharynx is clear and moist. No oropharyngeal exudate.   Eyes: Conjunctivae and EOM are normal. Pupils are equal, round, and reactive to light. Right eye exhibits no discharge. Left eye exhibits no discharge. No scleral icterus.   Neck: Neck supple. No JVD present. No tracheal deviation present. No thyromegaly present.   Cardiovascular: Normal rate, regular rhythm, normal heart sounds and intact distal pulses. No murmur heard.   Pulmonary/Chest: Effort normal and breath sounds normal. No stridor. No respiratory distress. Patient has no wheezes, no rales, exhibits no tenderness.   Abdominal: Soft. Bowel sounds are normal. Patient exhibits no distension and no mass. There is no tenderness. There is no rebound and no guarding.   Lymphadenopathy:  Patient has no cervical adenopathy.   Neurological: Patient is alert and oriented to person, place, and time. Patient has normal reflexes. No cranial nerve deficit. Coordination normal.   Skin: Skin is warm and dry. No rash noted. Patient is not diaphoretic. No erythema. No pallor.   Normal pelvic exam    The 10-year ASCVD risk score (Fairbank DC Jr., et al., 2013) is: 0.8%    Values used to calculate the score:      Age: 39 years      Sex: Female      Is Non- : No      Diabetic: No      Tobacco smoker: Yes      Systolic Blood Pressure: 118 mmHg      Is BP treated: No      HDL Cholesterol: 75 mg/dL      Total Cholesterol: 163 mg/dL         Medication " List           Accurate as of 8/19/19 10:27 AM. If you have any questions, ask your nurse or doctor.               CONTINUE taking these medications    citalopram 20 MG tablet  Also known as:  celeXA  INSTRUCTIONS:  Take 1 tablet (20 mg total) by mouth daily.        multivitamin therapeutic tablet  Also known as:  THERAGRAN  INSTRUCTIONS:  Take 1 tablet by mouth daily.               Additional Screenings Completed Today:

## 2021-07-03 NOTE — ADDENDUM NOTE
Addendum Note by Emy Larson MD at 8/4/2020  1:00 PM     Author: Emy Larson MD Service: -- Author Type: Physician    Filed: 8/4/2020  1:08 PM Encounter Date: 8/4/2020 Status: Signed    : Emy Larson MD (Physician)    Addended by: EMY LARSON on: 8/4/2020 01:08 PM        Modules accepted: Orders

## 2021-07-24 ENCOUNTER — HEALTH MAINTENANCE LETTER (OUTPATIENT)
Age: 41
End: 2021-07-24

## 2021-09-18 ENCOUNTER — HEALTH MAINTENANCE LETTER (OUTPATIENT)
Age: 41
End: 2021-09-18

## 2021-10-13 ENCOUNTER — OFFICE VISIT (OUTPATIENT)
Dept: FAMILY MEDICINE | Facility: CLINIC | Age: 41
End: 2021-10-13
Payer: COMMERCIAL

## 2021-10-13 ENCOUNTER — HOSPITAL ENCOUNTER (OUTPATIENT)
Dept: MRI IMAGING | Facility: CLINIC | Age: 41
Discharge: HOME | End: 2021-10-13
Attending: FAMILY MEDICINE | Admitting: FAMILY MEDICINE
Payer: COMMERCIAL

## 2021-10-13 VITALS
BODY MASS INDEX: 33.43 KG/M2 | HEIGHT: 67 IN | HEART RATE: 64 BPM | SYSTOLIC BLOOD PRESSURE: 118 MMHG | DIASTOLIC BLOOD PRESSURE: 72 MMHG | OXYGEN SATURATION: 98 % | TEMPERATURE: 98.1 F | WEIGHT: 213 LBS

## 2021-10-13 DIAGNOSIS — M25.562 ACUTE PAIN OF LEFT KNEE: ICD-10-CM

## 2021-10-13 DIAGNOSIS — M25.562 ACUTE PAIN OF LEFT KNEE: Primary | ICD-10-CM

## 2021-10-13 DIAGNOSIS — Z23 NEED FOR VACCINATION: ICD-10-CM

## 2021-10-13 LAB
C REACTIVE PROTEIN LHE: 0.5 MG/DL (ref 0–0.8)
ERYTHROCYTE [DISTWIDTH] IN BLOOD BY AUTOMATED COUNT: 17.3 % (ref 10–15)
ERYTHROCYTE [SEDIMENTATION RATE] IN BLOOD BY WESTERGREN METHOD: 12 MM/HR (ref 0–20)
HCT VFR BLD AUTO: 29.8 % (ref 35–47)
HGB BLD-MCNC: 9.1 G/DL (ref 11.7–15.7)
MCH RBC QN AUTO: 23.5 PG (ref 26.5–33)
MCHC RBC AUTO-ENTMCNC: 30.5 G/DL (ref 31.5–36.5)
MCV RBC AUTO: 77 FL (ref 78–100)
PLATELET # BLD AUTO: 322 10E3/UL (ref 150–450)
RBC # BLD AUTO: 3.87 10E6/UL (ref 3.8–5.2)
RHEUMATOID FACT SER NEPH-ACNC: 27 IU/ML (ref 0–30)
WBC # BLD AUTO: 7.7 10E3/UL (ref 4–11)

## 2021-10-13 PROCEDURE — 86141 C-REACTIVE PROTEIN HS: CPT | Performed by: FAMILY MEDICINE

## 2021-10-13 PROCEDURE — 36415 COLL VENOUS BLD VENIPUNCTURE: CPT | Performed by: FAMILY MEDICINE

## 2021-10-13 PROCEDURE — 73721 MRI JNT OF LWR EXTRE W/O DYE: CPT | Mod: LT

## 2021-10-13 PROCEDURE — 0004A COVID-19,PF,PFIZER (12+ YRS): CPT | Performed by: FAMILY MEDICINE

## 2021-10-13 PROCEDURE — 85027 COMPLETE CBC AUTOMATED: CPT | Performed by: FAMILY MEDICINE

## 2021-10-13 PROCEDURE — 90686 IIV4 VACC NO PRSV 0.5 ML IM: CPT | Performed by: FAMILY MEDICINE

## 2021-10-13 PROCEDURE — 90471 IMMUNIZATION ADMIN: CPT | Performed by: FAMILY MEDICINE

## 2021-10-13 PROCEDURE — 91300 COVID-19,PF,PFIZER (12+ YRS): CPT | Performed by: FAMILY MEDICINE

## 2021-10-13 PROCEDURE — 85652 RBC SED RATE AUTOMATED: CPT | Performed by: FAMILY MEDICINE

## 2021-10-13 PROCEDURE — 99214 OFFICE O/P EST MOD 30 MIN: CPT | Mod: 25 | Performed by: FAMILY MEDICINE

## 2021-10-13 PROCEDURE — 86038 ANTINUCLEAR ANTIBODIES: CPT | Performed by: FAMILY MEDICINE

## 2021-10-13 PROCEDURE — 86431 RHEUMATOID FACTOR QUANT: CPT | Performed by: FAMILY MEDICINE

## 2021-10-13 RX ORDER — CITALOPRAM HYDROBROMIDE 20 MG/1
1 TABLET ORAL DAILY
COMMUNITY
Start: 2021-10-04 | End: 2022-02-07

## 2021-10-13 ASSESSMENT — MIFFLIN-ST. JEOR: SCORE: 1646.4

## 2021-10-13 NOTE — PROGRESS NOTES
"    Assessment & Plan     Acute pain of left knee    - MR Knee Left w/o Contrast  - CBC with platelets  - ESR: Erythrocyte sedimentation rate  - CRP, inflammation  - Rheumatoid factor  - Anti Nuclear Karolyn IgG by IFA with Reflex  - CBC with platelets  - ESR: Erythrocyte sedimentation rate  - CRP, inflammation  - Rheumatoid factor  - Anti Nuclear Karolyn IgG by IFA with Reflex    I suspect meniscal tear.  It is possible that this might have happened previously, and a loose portion might have been pinched yesterday.    I recommend MRI.  Knee x-ray would not be able to detect meniscal injury, and would not add significant information since we would do MRI in any event.    It is possible that this could be due to an inflammatory joint condition.    It does not appear to be a septic joint at this time.    Further treatment depending upon MRI and lab results.      Need for vaccination    - INFLUENZA VACCINE IM >6 MO VALENT IIV4 (ALFURIA/FLUZONE)  - COVID-19,PF,PFIZER (12+ YRS)               BMI:   Estimated body mass index is 33.83 kg/m  as calculated from the following:    Height as of this encounter: 1.69 m (5' 6.54\").    Weight as of this encounter: 96.6 kg (213 lb).           Return in about 1 year (around 10/13/2022) for Routine preventive.    Melvin Kc MD, MD  [facility] DEDE Bray is a 41 year old who presents for the following health issues     HPI     Last evening she developed left knee pain.  No injury.  Pain worsened overnight, causing poor sleep.  This morning she was not able to fully straighten or fully bend her knee, making it difficult to walk.  Pain is worst near the patella.  She has not had similar pain in the past.  Both knees have mild popping and scraping sounds when climbing stairs.  She does not kneel, squat, or twist her knees.    Family Hx: 2 years ago her brother developed leukemia, and the first symptoms were bruising and swollen joints.    In addition to " "citalopram, she takes an acid reducer, and a vitamin.    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        Current Outpatient Medications   Medication Sig Dispense Refill     citalopram (CELEXA) 20 MG tablet Take 1 tablet by mouth daily           Review of Systems   No fever, other swollen joints.      Objective    /72 (BP Location: Left arm, Patient Position: Sitting, Cuff Size: Adult Regular)   Pulse 64   Temp 98.1  F (36.7  C) (Oral)   Ht 1.69 m (5' 6.54\")   Wt 96.6 kg (213 lb)   LMP 10/04/2021 (Within Days)   SpO2 98%   BMI 33.83 kg/m    Body mass index is 33.83 kg/m .  Physical Exam   Left knee: mild to moderate effusion, mildly increased warmth.  No erythema.  ROM 10 to 80 degrees, with pain.  Tenderness near patella, not involving quadriceps or patellar tendon.  No ligament instability.  Bambi very painful.  Popliteal fossa normal other than effusion.  Matt's negative  No calf swelling or tenderness.            "

## 2021-10-14 LAB — ANA SER QL IF: NEGATIVE

## 2021-10-15 ENCOUNTER — TELEPHONE (OUTPATIENT)
Dept: FAMILY MEDICINE | Facility: CLINIC | Age: 41
End: 2021-10-15

## 2021-10-15 DIAGNOSIS — D64.9 ANEMIA, UNSPECIFIED TYPE: Primary | ICD-10-CM

## 2021-10-15 NOTE — TELEPHONE ENCOUNTER
Reason for Call:  Other test results    Detailed comments: Patient states she receive her test results from 10/13 via [employer] Patient has questions regarding results and would like Dr. Kc to call and discuss.    Phone Number Patient can be reached at: Cell number on file:    Telephone Information:   Mobile [phone]       Best Time: any    Can we leave a detailed message on this number? YES    Call taken on 10/15/2021 at 12:12 PM by Rody Gregorio

## 2021-10-15 NOTE — TELEPHONE ENCOUNTER
Pt returning Dr. Kc's call. Spoke to him and he said he'll call pt back. Pt will wait for call. Thanks.

## 2021-10-16 NOTE — TELEPHONE ENCOUNTER
I called pt.  No meniscal damage.  Some osteoarthritis, and some wearing of the cartilage on the back of the patella.  Knee has improved somewhat in the past day.  She will avoid stress on the knee.  If not improving, she will contact me and I will refer to Ortho.    Hb 9.1, had been 12.5 two years ago.  Has not been taking iron for a long time.  Had Rubén-en-Y 17 years ago.  Has not had melena or other signs of bleeding.  Periods are usually normal.  She will resume iron and have lab-only Hb and iron in 1 month, or sooner if more tired.    She has been taking omeprazole.  Discussed possible effect on vitamin D and calcium.  She will switch to famotidine.    lena

## 2021-10-18 ENCOUNTER — MYC MEDICAL ADVICE (OUTPATIENT)
Dept: FAMILY MEDICINE | Facility: CLINIC | Age: 41
End: 2021-10-18

## 2021-10-18 DIAGNOSIS — Z98.84 HISTORY OF ROUX-EN-Y GASTRIC BYPASS: Primary | ICD-10-CM

## 2021-10-19 PROBLEM — F32.9 MAJOR DEPRESSION: Status: ACTIVE | Noted: 2018-10-25

## 2021-11-12 ENCOUNTER — HOSPITAL ENCOUNTER (OUTPATIENT)
Dept: MAMMOGRAPHY | Facility: CLINIC | Age: 41
Discharge: HOME | End: 2021-11-12
Attending: FAMILY MEDICINE | Admitting: FAMILY MEDICINE
Payer: COMMERCIAL

## 2021-11-12 DIAGNOSIS — Z12.31 SCREENING MAMMOGRAM, ENCOUNTER FOR: ICD-10-CM

## 2021-11-12 PROCEDURE — 77063 BREAST TOMOSYNTHESIS BI: CPT

## 2021-11-26 ENCOUNTER — HOSPITAL ENCOUNTER (OUTPATIENT)
Dept: ULTRASOUND IMAGING | Facility: CLINIC | Age: 41
End: 2021-11-26
Attending: FAMILY MEDICINE
Payer: COMMERCIAL

## 2021-11-26 ENCOUNTER — HOSPITAL ENCOUNTER (OUTPATIENT)
Dept: MAMMOGRAPHY | Facility: CLINIC | Age: 41
End: 2021-11-26
Attending: FAMILY MEDICINE
Payer: COMMERCIAL

## 2021-11-26 DIAGNOSIS — N64.89 BREAST ASYMMETRY: ICD-10-CM

## 2021-11-26 PROCEDURE — 77061 BREAST TOMOSYNTHESIS UNI: CPT | Mod: RT

## 2021-11-26 PROCEDURE — 76642 ULTRASOUND BREAST LIMITED: CPT | Mod: RT

## 2021-12-15 ENCOUNTER — OFFICE VISIT (OUTPATIENT)
Dept: FAMILY MEDICINE | Facility: CLINIC | Age: 41
End: 2021-12-15
Payer: COMMERCIAL

## 2021-12-15 VITALS
DIASTOLIC BLOOD PRESSURE: 70 MMHG | SYSTOLIC BLOOD PRESSURE: 110 MMHG | WEIGHT: 216.7 LBS | HEART RATE: 70 BPM | HEIGHT: 68 IN | BODY MASS INDEX: 32.84 KG/M2 | OXYGEN SATURATION: 99 % | TEMPERATURE: 98.2 F

## 2021-12-15 DIAGNOSIS — D64.9 ANEMIA, UNSPECIFIED TYPE: ICD-10-CM

## 2021-12-15 DIAGNOSIS — F41.1 GENERALIZED ANXIETY DISORDER: ICD-10-CM

## 2021-12-15 DIAGNOSIS — M25.561 ACUTE PAIN OF RIGHT KNEE: ICD-10-CM

## 2021-12-15 DIAGNOSIS — K21.9 GASTROESOPHAGEAL REFLUX DISEASE, UNSPECIFIED WHETHER ESOPHAGITIS PRESENT: ICD-10-CM

## 2021-12-15 DIAGNOSIS — F33.0 MILD EPISODE OF RECURRENT MAJOR DEPRESSIVE DISORDER (H): ICD-10-CM

## 2021-12-15 DIAGNOSIS — Z00.00 ROUTINE ADULT HEALTH MAINTENANCE: Primary | ICD-10-CM

## 2021-12-15 DIAGNOSIS — Z98.84 HISTORY OF ROUX-EN-Y GASTRIC BYPASS: ICD-10-CM

## 2021-12-15 LAB
ERYTHROCYTE [DISTWIDTH] IN BLOOD BY AUTOMATED COUNT: 19.9 % (ref 10–15)
HCT VFR BLD AUTO: 39.6 % (ref 35–47)
HGB BLD-MCNC: 12.3 G/DL (ref 11.7–15.7)
MCH RBC QN AUTO: 24.5 PG (ref 26.5–33)
MCHC RBC AUTO-ENTMCNC: 31.1 G/DL (ref 31.5–36.5)
MCV RBC AUTO: 79 FL (ref 78–100)
PLATELET # BLD AUTO: 238 10E3/UL (ref 150–450)
RBC # BLD AUTO: 5.03 10E6/UL (ref 3.8–5.2)
WBC # BLD AUTO: 6.1 10E3/UL (ref 4–11)

## 2021-12-15 PROCEDURE — 84443 ASSAY THYROID STIM HORMONE: CPT | Performed by: FAMILY MEDICINE

## 2021-12-15 PROCEDURE — 83540 ASSAY OF IRON: CPT | Performed by: FAMILY MEDICINE

## 2021-12-15 PROCEDURE — 82607 VITAMIN B-12: CPT | Performed by: FAMILY MEDICINE

## 2021-12-15 PROCEDURE — 84439 ASSAY OF FREE THYROXINE: CPT | Performed by: FAMILY MEDICINE

## 2021-12-15 PROCEDURE — 99396 PREV VISIT EST AGE 40-64: CPT | Performed by: FAMILY MEDICINE

## 2021-12-15 PROCEDURE — 85027 COMPLETE CBC AUTOMATED: CPT | Performed by: FAMILY MEDICINE

## 2021-12-15 PROCEDURE — 36415 COLL VENOUS BLD VENIPUNCTURE: CPT | Performed by: FAMILY MEDICINE

## 2021-12-15 PROCEDURE — 84481 FREE ASSAY (FT-3): CPT | Performed by: FAMILY MEDICINE

## 2021-12-15 PROCEDURE — 82306 VITAMIN D 25 HYDROXY: CPT | Performed by: FAMILY MEDICINE

## 2021-12-15 PROCEDURE — 82728 ASSAY OF FERRITIN: CPT | Performed by: FAMILY MEDICINE

## 2021-12-15 PROCEDURE — 99214 OFFICE O/P EST MOD 30 MIN: CPT | Mod: 25 | Performed by: FAMILY MEDICINE

## 2021-12-15 PROCEDURE — 96127 BRIEF EMOTIONAL/BEHAV ASSMT: CPT | Mod: 59 | Performed by: FAMILY MEDICINE

## 2021-12-15 RX ORDER — FERROUS SULFATE 324(65)MG
324 TABLET, DELAYED RELEASE (ENTERIC COATED) ORAL
COMMUNITY
End: 2022-12-11

## 2021-12-15 ASSESSMENT — ANXIETY QUESTIONNAIRES
GAD7 TOTAL SCORE: 0
6. BECOMING EASILY ANNOYED OR IRRITABLE: NOT AT ALL
IF YOU CHECKED OFF ANY PROBLEMS ON THIS QUESTIONNAIRE, HOW DIFFICULT HAVE THESE PROBLEMS MADE IT FOR YOU TO DO YOUR WORK, TAKE CARE OF THINGS AT HOME, OR GET ALONG WITH OTHER PEOPLE: NOT DIFFICULT AT ALL
5. BEING SO RESTLESS THAT IT IS HARD TO SIT STILL: NOT AT ALL
1. FEELING NERVOUS, ANXIOUS, OR ON EDGE: NOT AT ALL
2. NOT BEING ABLE TO STOP OR CONTROL WORRYING: NOT AT ALL
4. TROUBLE RELAXING: NOT AT ALL
3. WORRYING TOO MUCH ABOUT DIFFERENT THINGS: NOT AT ALL
7. FEELING AFRAID AS IF SOMETHING AWFUL MIGHT HAPPEN: NOT AT ALL

## 2021-12-15 ASSESSMENT — MIFFLIN-ST. JEOR: SCORE: 1686.44

## 2021-12-15 ASSESSMENT — PATIENT HEALTH QUESTIONNAIRE - PHQ9: SUM OF ALL RESPONSES TO PHQ QUESTIONS 1-9: 2

## 2021-12-15 NOTE — PROGRESS NOTES
SUBJECTIVE:   CC: Katarina Mcclain is an 41 year old woman who presents for preventive health visit.       Patient has been advised of split billing requirements and indicates understanding: Yes  Healthy Habits:     Getting at least 3 servings of Calcium per day:  NO    Bi-annual eye exam:  Yes    Dental care twice a year:  Yes    Sleep apnea or symptoms of sleep apnea:  None    Diet:  Regular (no restrictions)    Frequency of exercise:  None    Taking medications regularly:  Yes    Barriers to taking medications:  Not applicable    Medication side effects:  None    PHQ-2 Total Score: 1    Additional concerns today:  No  Knee Pain          Today's PHQ-2 Score:   PHQ-2 ( 1999 Pfizer) 12/13/2021   Q1: Little interest or pleasure in doing things 1   Q2: Feeling down, depressed or hopeless 0   PHQ-2 Score 1   Q1: Little interest or pleasure in doing things Several days   Q2: Feeling down, depressed or hopeless Not at all   PHQ-2 Score 1       Abuse: Current or Past (Physical, Sexual or Emotional) - No  Do you feel safe in your environment? Yes    Have you ever done Advance Care Planning? (For example, a Health Directive, POLST, or a discussion with a medical provider or your loved ones about your wishes): Yes, patient states has an Advance Care Planning document and will bring a copy to the clinic.    Social History     Tobacco Use     Smoking status: Former Smoker     Types: Cigarettes     Start date: 9/3/2014     Smokeless tobacco: Never Used     Tobacco comment: no passive exposure   Substance Use Topics     Alcohol use: Yes     Comment: Alcoholic Drinks/day: weekends     If you drink alcohol do you typically have >3 drinks per day or >7 drinks per week? No    Reviewed orders with patient.  Reviewed health maintenance and updated orders accordingly - Yes  Lab work is in process    Breast Cancer Screening:    FHS-7:   Breast CA Risk Assessment (FHS-7) 11/12/2021 12/13/2021   Did any of your first-degree relatives  have breast or ovarian cancer? Yes No   Did any of your relatives have bilateral breast cancer? No Unknown   Did any man in your family have breast cancer? No Yes   Did any woman in your family have breast and ovarian cancer? No Yes   Did any woman in your family have breast cancer before age 50 y? Yes Yes   Do you have 2 or more relatives with breast and/or ovarian cancer? Yes Yes   Do you have 2 or more relatives with breast and/or bowel cancer? Yes Yes     click delete button to remove this line now  Mammogram Screening - Offered annual screening and updated Health Maintenance for mutual plan based on risk factor consideration    Pertinent mammograms are reviewed under the imaging tab.    History of abnormal Pap smear: Status post benign hysterectomy. Health Maintenance and Surgical History updated.  PAP / HPV 8/23/2017 9/19/2002 5/10/1999   PAP Negative for squamous intraepithelial lesion or malignancy  Electronically signed by Melanie Smith CT (ASCP) on 8/31/2017 at 11:08 AM   - -   PAP (Historical) - WNL wnl     Reviewed and updated as needed this visit by clinical staff  Tobacco   Meds  Problems            Reviewed and updated as needed this visit by Provider    Meds  Problems           Past Medical History:   Diagnosis Date     Allergic rhinitis, cause unspecified     seasonal allergies     Anxiety      Asthma      Carbuncle and furuncle of trunk 4/2001    R breast     Hidradenitis 4/95     Obesity, unspecified      Personal history of tobacco use, presenting hazards to health       Past Surgical History:   Procedure Laterality Date     C GASTRIC BYPASS,OBESE<100CM AMADOU-EN-Y  2/27/04    [facility]     REVISION AMADOU-EN-Y         Review of Systems  CONSTITUTIONAL: NEGATIVE for fever, chills, change in weight  INTEGUMENTARU/SKIN: NEGATIVE for worrisome rashes, moles or lesions  EYES: NEGATIVE for vision changes or irritation  ENT: NEGATIVE for ear, mouth and throat problems  RESP: NEGATIVE  "for significant cough or SOB  BREAST: NEGATIVE for masses, tenderness or discharge  CV: NEGATIVE for chest pain, palpitations or peripheral edema  GI: NEGATIVE for nausea, abdominal pain, heartburn, or change in bowel habits  : NEGATIVE for unusual urinary or vaginal symptoms. Periods are regular.  NEURO: NEGATIVE for weakness, dizziness or paresthesias  PSYCHIATRIC: NEGATIVE for changes in mood or affect     OBJECTIVE:   /70 (BP Location: Left arm, Patient Position: Sitting, Cuff Size: Adult Large)   Pulse 70   Temp 98.2  F (36.8  C) (Oral)   Ht 1.727 m (5' 8\")   Wt 98.3 kg (216 lb 11.2 oz)   LMP 12/10/2021   SpO2 99%   BMI 32.95 kg/m    Physical Exam  GENERAL: healthy, alert and no distress  EYES: Eyes grossly normal to inspection, PERRL and conjunctivae and sclerae normal  NECK: no adenopathy, no asymmetry, masses, or scars and thyroid normal to palpation  RESP: lungs clear to auscultation - no rales, rhonchi or wheezes  BREAST: normal without masses, tenderness or nipple discharge and no palpable axillary masses or adenopathy  CV: regular rate and rhythm, normal S1 S2, no S3 or S4, no murmur, click or rub, no peripheral edema and peripheral pulses strong  ABDOMEN: soft, nontender, no hepatosplenomegaly, no masses and bowel sounds normal  MS: no gross musculoskeletal defects noted, no edema  SKIN: no suspicious lesions or rashes  NEURO: Normal strength and tone, mentation intact and speech normal  PSYCH: mentation appears normal, affect normal/bright    Diagnostic Test Results:  Labs reviewed in Epic    ASSESSMENT/PLAN:   Katarina was seen today for physical, knee pain and gastrophageal reflux.    Diagnoses and all orders for this visit:    Routine adult health maintenance  -     T3 Free; Future  -     T4 free; Future  -     TSH; Future  -     T3 Free  -     T4 free  -     TSH  We discussed healthy lifestyle, nutrition, cardiovascular risk reduction, self care, safety, sunscreen, and timing of cancer " "screening.  Health maintenance screening and immunizations reviewed with the patient.  Follow up yearly for the annual physical.  Will come back for fasting labs    Generalized anxiety disorder, Mild episode of recurrent major depressive disorder (H)  PHQ9=2, GAD7= 0  On celexa 20mg  Seeing therapist but will ask to \"go deeper\", referencing to relationships and other troubling aspects of her life  Motivational interviewing was utilized today.  Modified cognitive behavioral therapy was performed with counseling on internal locus of control.  Discussed importance of self care, sleep, nutrition, hydration, exercise, and mindfulness     Acute pain of right knee  -     Physical Therapy Referral; Future  Was seen recently for left knee pain with essentially unremarkable labs and MRI. I reviewed the office notes, labs, and MRI report  With similar symptoms on the right knee over the last 5 days.  No h/o trauma/injury to the knees but with years of \"popping and crunching\"  Start PT    Gastroesophageal reflux disease, unspecified whether esophagitis present  Discussed PPI and H2 blocker    History of Rubén-en-Y gastric bypass  -     CBC with platelets; Future  -     Ferritin; Future  -     Vitamin B12; Future  -     Vitamin D Deficiency; Future  -     CBC with platelets  -     Ferritin  -     Vitamin B12  -     Vitamin D Deficiency  -     Iron    Patient has been advised of split billing requirements and indicates understanding: Yes  COUNSELING:  Reviewed preventive health counseling, as reflected in patient instructions    Estimated body mass index is 32.95 kg/m  as calculated from the following:    Height as of this encounter: 1.727 m (5' 8\").    Weight as of this encounter: 98.3 kg (216 lb 11.2 oz).    Weight management plan: Discussed healthy diet and exercise guidelines    She reports that she has quit smoking. Her smoking use included cigarettes. She started smoking about 7 years ago. She has never used smokeless " tobacco.      Counseling Resources:  ATP IV Guidelines  Pooled Cohorts Equation Calculator  Breast Cancer Risk Calculator  BRCA-Related Cancer Risk Assessment: FHS-7 Tool  FRAX Risk Assessment  ICSI Preventive Guidelines  Dietary Guidelines for Americans, 2010  USDA's MyPlate  ASA Prophylaxis  Lung CA Screening    Emy Saleh MD  [facility]

## 2021-12-16 ENCOUNTER — MYC MEDICAL ADVICE (OUTPATIENT)
Dept: FAMILY MEDICINE | Facility: CLINIC | Age: 41
End: 2021-12-16
Payer: COMMERCIAL

## 2021-12-16 DIAGNOSIS — Z01.89 PATIENT REQUEST FOR DIAGNOSTIC TESTING: Primary | ICD-10-CM

## 2021-12-16 LAB
FERRITIN SERPL-MCNC: 6 NG/ML (ref 10–130)
IRON SERPL-MCNC: 51 UG/DL (ref 42–175)
T3FREE SERPL-MCNC: 2.4 PG/ML (ref 1.6–3.9)
T4 FREE SERPL-MCNC: 1 NG/DL (ref 0.7–1.8)
TSH SERPL DL<=0.005 MIU/L-ACNC: 1.12 UIU/ML (ref 0.3–5)
VIT B12 SERPL-MCNC: 248 PG/ML (ref 213–816)

## 2021-12-17 LAB — DEPRECATED CALCIDIOL+CALCIFEROL SERPL-MC: 15 UG/L (ref 30–80)

## 2021-12-28 ENCOUNTER — THERAPY VISIT (OUTPATIENT)
Dept: PHYSICAL THERAPY | Facility: CLINIC | Age: 41
End: 2021-12-28
Payer: COMMERCIAL

## 2021-12-28 DIAGNOSIS — M25.561 ACUTE PAIN OF RIGHT KNEE: Primary | ICD-10-CM

## 2021-12-28 PROCEDURE — 97161 PT EVAL LOW COMPLEX 20 MIN: CPT | Mod: GP | Performed by: PHYSICAL THERAPIST

## 2021-12-28 PROCEDURE — 97110 THERAPEUTIC EXERCISES: CPT | Mod: GP | Performed by: PHYSICAL THERAPIST

## 2021-12-28 ASSESSMENT — ACTIVITIES OF DAILY LIVING (ADL)
KNEEL ON THE FRONT OF YOUR KNEE: ACTIVITY IS VERY DIFFICULT
KNEE_ACTIVITY_OF_DAILY_LIVING_SUM: 40
KNEE_ACTIVITY_OF_DAILY_LIVING_SCORE: 57.14
HOW_WOULD_YOU_RATE_THE_OVERALL_FUNCTION_OF_YOUR_KNEE_DURING_YOUR_USUAL_DAILY_ACTIVITIES?: NEARLY NORMAL
WALK: ACTIVITY IS MINIMALLY DIFFICULT
GIVING WAY, BUCKLING OR SHIFTING OF KNEE: THE SYMPTOM AFFECTS MY ACTIVITY SLIGHTLY
SIT WITH YOUR KNEE BENT: ACTIVITY IS SOMEWHAT DIFFICULT
WEAKNESS: THE SYMPTOM AFFECTS MY ACTIVITY SLIGHTLY
GO UP STAIRS: ACTIVITY IS SOMEWHAT DIFFICULT
RISE FROM A CHAIR: ACTIVITY IS SOMEWHAT DIFFICULT
GO DOWN STAIRS: ACTIVITY IS FAIRLY DIFFICULT
RAW_SCORE: 40
PAIN: THE SYMPTOM AFFECTS MY ACTIVITY SLIGHTLY
STIFFNESS: THE SYMPTOM AFFECTS MY ACTIVITY SLIGHTLY
STAND: ACTIVITY IS MINIMALLY DIFFICULT
SWELLING: THE SYMPTOM AFFECTS MY ACTIVITY SLIGHTLY
LIMPING: THE SYMPTOM AFFECTS MY ACTIVITY SLIGHTLY
SQUAT: ACTIVITY IS FAIRLY DIFFICULT
AS_A_RESULT_OF_YOUR_KNEE_INJURY,_HOW_WOULD_YOU_RATE_YOUR_CURRENT_LEVEL_OF_DAILY_ACTIVITY?: NEARLY NORMAL
HOW_WOULD_YOU_RATE_THE_CURRENT_FUNCTION_OF_YOUR_KNEE_DURING_YOUR_USUAL_DAILY_ACTIVITIES_ON_A_SCALE_FROM_0_TO_100_WITH_100_BEING_YOUR_LEVEL_OF_KNEE_FUNCTION_PRIOR_TO_YOUR_INJURY_AND_0_BEING_THE_INABILITY_TO_PERFORM_ANY_OF_YOUR_USUAL_DAILY_ACTIVITIES?: 90

## 2021-12-28 NOTE — PROGRESS NOTES
Physical Therapy Initial Evaluation  Subjective:  The history is provided by the patient. No  was used.   Patient Health History  Katarina Mcclain being seen for knee pain/issues .     Problem began: 12/7/2021.   Problem occurred: unknown    Pain is reported as 4/10 (0/10 at rest currently. ) on pain scale.  General health as reported by patient is good.  Pertinent medical history includes: anemia and overweight.   Red flags:  Calf pain-swelling-warmth.  Medical allergies: none.   Surgeries include:  Other. Other surgery history details: Gastric Bypass, Lasik eye, and hammer toes.    Current medications:  Anti-depressants.    Current occupation is System COordinator of Customer Experience .   Primary job tasks include:  Computer work and prolonged sitting.                  Therapist Generated HPI Evaluation  Problem details: Patient had knee pain that started in the left knee several weeks ago.  That pain has subsided, however right knee pain is now hurting in the same way and has not gotten better. Pain in the front of the knee. Went into the MD, was given a brace and Physical Therapy order. .         Type of problem:  Right knee.    This is a new condition.  Condition occurred with:  Other reason and degenerative joint disease.  Where condition occurred: other.  Patient reports pain:  Anterior and sub patellar.  Pain is described as aching and is intermittent.  Pain radiates to:  Thigh, knee and lower leg. Pain is the same all the time.  Since onset symptoms are gradually worsening.  Associated symptoms:  Loss of strength, edema, loss of motion/stiffness and buckling/giving out. Symptoms are exacerbated by activity and descending stairs  and relieved by bracing/immobilizing.  Special tests included:  X-ray.    Restrictions due to condition include:  Working in normal job without restrictions.  Barriers include:  None as reported by patient.                        Objective:  System                                            Hip Evaluation    Hip Strength:    Flexion:   Left: 5-/5   Pain:  Right: 4+/5   Pain:                    Extension:  Left: 4+/5  Pain:Right: 4+/5    Pain:    Abduction:  Left: 4+/5     Pain:Right: 4+/5    Pain:    Internal Rotation:  Left: 5/5    Pain:Right: 5/5   Pain:  External Rotation:  Left: 5/5   Pain:  Right: 5/5   Pain:                     Knee Evaluation:  ROM:    AROM    Hyperextension:  Left:  7    Right: 3    Flexion: Left: 133    Right: 113    Pain: Pain with end range flexion and extension in R knee.     Strength:         Quad Set Left:  Good    Pain: ++   Quad Set Right:  Fair    Pain: ++  Ligament Testing:  Normal                Special Tests:   Left knee positive for the following special tests:  Patellar Compression and Patellar Tracking-Abduction Lateral  Right knee positive for the following tests:  Patellar Compression and Patellar Tracking-Abduction Lateral  Palpation:    Left knee tenderness present at:  Patellar Tendon and Patellar Inferior  Right knee tenderness present at:  Patellar Tendon and Patellar Inferior  Edema:  Edema of the knee: small rounded pocket of swelling lateral to R patella.     Mobility Testing:      Patellofemoral Medial:  Left: normal    Right: normal  Patellofemoral Lateral:  Left: normal    Right: normal  Patellofemoral Superior:  Left: hypomobile    Right: hypomobile  Patellofemoral Inferior:  Left: hypomobile    Right: hypomobile  Functional Testing:          Quad:    Single Leg Squat:  Left:       Right:        Bilateral Leg Squat:  Pronation bilaterally.  Pain present at 25% of squat.   Mild loss of control, excessive anterior knee excursion and femoral IR                  General Evaluation:                              Gait:    Significant findings:  Dynamic valgus noted bilaterally throughout entire stance phase of gait. Minimal use of glutes bilaterally.                                      ROS    Assessment/Plan:    Patient is a 41  year old female with right side knee complaints.    Patient has the following significant findings with corresponding treatment plan.                Diagnosis 1:  Right Knee Pain  Pain -  hot/cold therapy, electric stimulation, mechanical traction, manual therapy, splint/taping/bracing/orthotics, self management, education and home program  Decreased ROM/flexibility - manual therapy, therapeutic exercise and home program  Decreased strength - therapeutic exercise, therapeutic activities and home program  Inflammation - cold therapy, electric stimulation and self management/home program  Impaired gait - gait training and home program  Impaired muscle performance - neuro re-education and home program  Decreased function - therapeutic activities and home program  Instability -  Therapeutic Activity  Therapeutic Exercise  Neuromuscular Re-education  home program    Therapy Evaluation Codes:   1) Clinical presentation characteristics are:   Stable/Uncomplicated.  2) Decision-Making    Low complexity using standardized patient assessment instrument and/or measureable assessment of functional outcome.  Cumulative Therapy Evaluation is: Low complexity.    Previous and current functional limitations:  (See Goal Flow Sheet for this information)    Short term and Long term goals: (See Goal Flow Sheet for this information)     Communication ability:  Patient appears to be able to clearly communicate and understand verbal and written communication and follow directions correctly.  Treatment Explanation - The following has been discussed with the patient:   RX ordered/plan of care  Anticipated outcomes  Possible risks and side effects  This patient would benefit from PT intervention to resume normal activities.   Rehab potential is good.    Frequency:  1 X week, once daily  Duration:  for 8 weeks  Discharge Plan:  Achieve all LTG.  Independent in home treatment program.  Reach maximal therapeutic benefit.    Please refer to the  daily flowsheet for treatment today, total treatment time and time spent performing 1:1 timed codes.

## 2022-01-07 ENCOUNTER — THERAPY VISIT (OUTPATIENT)
Dept: PHYSICAL THERAPY | Facility: CLINIC | Age: 42
End: 2022-01-07
Payer: COMMERCIAL

## 2022-01-07 DIAGNOSIS — M25.561 ACUTE PAIN OF RIGHT KNEE: ICD-10-CM

## 2022-01-07 PROCEDURE — 97110 THERAPEUTIC EXERCISES: CPT | Mod: GP | Performed by: PHYSICAL THERAPIST

## 2022-01-07 PROCEDURE — 97112 NEUROMUSCULAR REEDUCATION: CPT | Mod: GP | Performed by: PHYSICAL THERAPIST

## 2022-02-07 DIAGNOSIS — F41.1 GENERALIZED ANXIETY DISORDER: Primary | ICD-10-CM

## 2022-02-07 RX ORDER — CITALOPRAM HYDROBROMIDE 20 MG/1
20 TABLET ORAL DAILY
Qty: 90 TABLET | Refills: 3 | Status: SHIPPED | OUTPATIENT
Start: 2022-02-07 | End: 2023-03-06

## 2022-02-07 NOTE — TELEPHONE ENCOUNTER
Pending Prescriptions:                       Disp   Refills    citalopram (CELEXA) 20 MG tablet                              Sig: Take 1 tablet (20 mg) by mouth daily    Last fill 10/4/21  Last visit 12/15/21

## 2022-03-08 PROBLEM — M25.561 RIGHT KNEE PAIN: Status: RESOLVED | Noted: 2021-12-28 | Resolved: 2022-03-08

## 2022-03-08 NOTE — PROGRESS NOTES
Discharge Note    Katarina failed to follow up and current status is unknown.  Please see information below for last relevant information on current status.  Patient seen for 2 visits.    SUBJECTIVE  Subjective changes noted by patient:  Knee is good.  It does not hurt it is not swelling, it bends fine. SHe did exercises and at first it was pretty challenging and now it is easy.   Did not ice. Has not pushed the knee to test it yet.   .  Current pain level is 0/10.     Previous pain level was  4/10 (with activity ).   Changes in function:  Yes (See Goal flowsheet attached for changes in current functional level)  Adverse reaction to treatment or activity: None    OBJECTIVE  Changes noted in objective findings: Full AROM/PROM of R knee. Patellar compression: (+) Crepitus noted with knee bending.      ASSESSMENT/PLAN  Diagnosis: R knee pain   Updated problem list and treatment plan:   Pain - HEP  STG/LTGs have been met or progress has been made towards goals:  Yes, please see goal flowsheet for most current information  Assessment of Progress: current status is unknown.    Last current status:     Self Management Plans:  HEP  I have re-evaluated this patient and find that the nature, scope, duration and intensity of the therapy is appropriate for the medical condition of the patient.  Katarina continues to require the following intervention to meet STG and LTG's:  HEP.    Recommendations:  Discharge with current home program.  Patient to follow up with MD as needed.    Please refer to the daily flowsheet for treatment today, total treatment time and time spent performing 1:1 timed codes.

## 2022-04-26 ENCOUNTER — TRANSFERRED RECORDS (OUTPATIENT)
Dept: HEALTH INFORMATION MANAGEMENT | Facility: CLINIC | Age: 42
End: 2022-04-26
Payer: COMMERCIAL

## 2022-11-20 ENCOUNTER — HEALTH MAINTENANCE LETTER (OUTPATIENT)
Age: 42
End: 2022-11-20

## 2022-12-01 ENCOUNTER — ANCILLARY PROCEDURE (OUTPATIENT)
Dept: MAMMOGRAPHY | Facility: CLINIC | Age: 42
End: 2022-12-01
Payer: COMMERCIAL

## 2022-12-01 DIAGNOSIS — Z12.31 VISIT FOR SCREENING MAMMOGRAM: ICD-10-CM

## 2022-12-01 PROCEDURE — 77067 SCR MAMMO BI INCL CAD: CPT

## 2022-12-07 ENCOUNTER — OFFICE VISIT (OUTPATIENT)
Dept: FAMILY MEDICINE | Facility: CLINIC | Age: 42
End: 2022-12-07
Payer: COMMERCIAL

## 2022-12-07 VITALS
SYSTOLIC BLOOD PRESSURE: 117 MMHG | OXYGEN SATURATION: 100 % | HEART RATE: 86 BPM | TEMPERATURE: 97.9 F | WEIGHT: 230 LBS | BODY MASS INDEX: 34.97 KG/M2 | DIASTOLIC BLOOD PRESSURE: 76 MMHG | RESPIRATION RATE: 16 BRPM

## 2022-12-07 DIAGNOSIS — Z01.89 PATIENT REQUEST FOR DIAGNOSTIC TESTING: ICD-10-CM

## 2022-12-07 DIAGNOSIS — Z12.4 SCREENING FOR MALIGNANT NEOPLASM OF CERVIX: ICD-10-CM

## 2022-12-07 DIAGNOSIS — Z00.00 ROUTINE ADULT HEALTH MAINTENANCE: ICD-10-CM

## 2022-12-07 DIAGNOSIS — N89.8 VAGINAL ITCHING: Primary | ICD-10-CM

## 2022-12-07 LAB
ABO/RH(D): NORMAL
CHOLEST SERPL-MCNC: 178 MG/DL
CLUE CELLS: ABNORMAL
ESTRADIOL SERPL-MCNC: 234 PG/ML
FSH SERPL IRP2-ACNC: 9.6 MIU/ML
HBA1C MFR BLD: 5.6 % (ref 0–5.6)
HDLC SERPL-MCNC: 93 MG/DL
LDLC SERPL CALC-MCNC: 69 MG/DL
NONHDLC SERPL-MCNC: 85 MG/DL
SPECIMEN EXPIRATION DATE: NORMAL
TRICHOMONAS, WET PREP: ABNORMAL
TRIGL SERPL-MCNC: 82 MG/DL
WBC'S/HIGH POWER FIELD, WET PREP: ABNORMAL
YEAST, WET PREP: ABNORMAL

## 2022-12-07 PROCEDURE — 87210 SMEAR WET MOUNT SALINE/INK: CPT | Performed by: FAMILY MEDICINE

## 2022-12-07 PROCEDURE — 83036 HEMOGLOBIN GLYCOSYLATED A1C: CPT | Performed by: FAMILY MEDICINE

## 2022-12-07 PROCEDURE — 99214 OFFICE O/P EST MOD 30 MIN: CPT | Performed by: FAMILY MEDICINE

## 2022-12-07 PROCEDURE — 87624 HPV HI-RISK TYP POOLED RSLT: CPT | Performed by: FAMILY MEDICINE

## 2022-12-07 PROCEDURE — 82670 ASSAY OF TOTAL ESTRADIOL: CPT | Performed by: FAMILY MEDICINE

## 2022-12-07 PROCEDURE — G0145 SCR C/V CYTO,THINLAYER,RESCR: HCPCS | Performed by: FAMILY MEDICINE

## 2022-12-07 PROCEDURE — 83001 ASSAY OF GONADOTROPIN (FSH): CPT | Performed by: FAMILY MEDICINE

## 2022-12-07 PROCEDURE — 87491 CHLMYD TRACH DNA AMP PROBE: CPT | Performed by: FAMILY MEDICINE

## 2022-12-07 PROCEDURE — 80061 LIPID PANEL: CPT | Performed by: FAMILY MEDICINE

## 2022-12-07 PROCEDURE — 87591 N.GONORRHOEAE DNA AMP PROB: CPT | Performed by: FAMILY MEDICINE

## 2022-12-07 PROCEDURE — 86900 BLOOD TYPING SEROLOGIC ABO: CPT | Performed by: FAMILY MEDICINE

## 2022-12-07 PROCEDURE — 86901 BLOOD TYPING SEROLOGIC RH(D): CPT | Performed by: FAMILY MEDICINE

## 2022-12-07 PROCEDURE — 36415 COLL VENOUS BLD VENIPUNCTURE: CPT | Performed by: FAMILY MEDICINE

## 2022-12-07 RX ORDER — FLUCONAZOLE 150 MG/1
150 TABLET ORAL WEEKLY
Qty: 2 TABLET | Refills: 0 | Status: SHIPPED | OUTPATIENT
Start: 2022-12-07 | End: 2022-12-15

## 2022-12-07 RX ORDER — TERCONAZOLE 0.4 %
1 CREAM WITH APPLICATOR VAGINAL AT BEDTIME
Qty: 45 G | Refills: 0 | Status: SHIPPED | OUTPATIENT
Start: 2022-12-07 | End: 2022-12-14

## 2022-12-07 ASSESSMENT — PATIENT HEALTH QUESTIONNAIRE - PHQ9
SUM OF ALL RESPONSES TO PHQ QUESTIONS 1-9: 1
SUM OF ALL RESPONSES TO PHQ QUESTIONS 1-9: 1
10. IF YOU CHECKED OFF ANY PROBLEMS, HOW DIFFICULT HAVE THESE PROBLEMS MADE IT FOR YOU TO DO YOUR WORK, TAKE CARE OF THINGS AT HOME, OR GET ALONG WITH OTHER PEOPLE: NOT DIFFICULT AT ALL

## 2022-12-07 NOTE — PROGRESS NOTES
"1. Vaginal itching  This is a 41 yo female - likely perimenopausal - with vaginal itching.  Has tried some OTC products without success (1 day Monistat and Vagisil).  I'm suspicious this is related to atrophic vaginitis.  But - will treat aggressively for yeast at this time (she reports significant discharge).  Will do testing as noted:  -Wet prep - Clinic Collect  - Chlamydia trachomatis/Neisseria gonorrhoeae by PCR - Clinic Collect  - Estradiol; Future  - Follicle stimulating hormone; Future  - terconazole (TERAZOL 7) 0.4 % vaginal cream; Place 1 applicator vaginally At Bedtime for 7 days  Dispense: 45 g; Refill: 0  - fluconazole (DIFLUCAN) 150 MG tablet; Take 1 tablet (150 mg) by mouth once a week for 2 doses  Dispense: 2 tablet; Refill: 0  - Estradiol  - Follicle stimulating hormone    2. Screening for malignant neoplasm of cervix  Due for cervix cancer screening - ordered   - Pap Screen with HPV - recommended age 30 - 65 years; Future  - Pap Screen with HPV - recommended age 30 - 65 years  - HPV Hold (Lab Only)    3. Patient request for diagnostic testing  Desires ABO testing - ordered   - ABO and Rh    4. Routine adult health maintenance  Also due for labs -   - Hemoglobin A1c  - Lipid Profile (Chol, Trig, HDL, LDL calc)      Scar Bray is a 42 year old accompanied by her self, presenting for the following health issues:  Vaginal Problem (Might be menopause )      Vaginal itching   Oct - expected period the  -   Came on the 15 - \"normal\" but late  Now, hasn't had it since  Not having sex - not pregnant    Feeling itching - can't get rid of it -   \"irritation blood\"    No fever   Had a \"cold\" right before that - just after Thanksgving -   No antibiotics    Night sweats - \"for real\" - no birth control -   Has strong history of breast cancer -   Mat aunt (age 32 -  age 42); 3 had breast cancer - 2 tested for BRCA and were negative; her mom has not had breast cancer  Some ovarian ca from dads " side -     Used Monistat 1 day treatment, Vagisil - ate yogurt  Mom - borderline diabetes      Vaginal Problem   Pertinent negatives include no fever and no abdominal pain.   History of Present Illness       Reason for visit:  Vaginal itching and irritation - Menopause?  Symptom onset:  1-2 weeks ago  Symptoms include:  Itching and irritation  Symptom intensity:  Moderate  Symptom progression:  Staying the same  Had these symptoms before:  No  What makes it worse:  Scratching  What makes it better:  No and I've tried over the counter meds    She eats 2-3 servings of fruits and vegetables daily.She consumes 0 sweetened beverage(s) daily.She exercises with enough effort to increase her heart rate 9 or less minutes per day.  She exercises with enough effort to increase her heart rate 3 or less days per week. She is missing 1 dose(s) of medications per week.  She is not taking prescribed medications regularly due to remembering to take.    Today's PHQ-9         PHQ-9 Total Score: 1    PHQ-9 Q9 Thoughts of better off dead/self-harm past 2 weeks :   Not at all    How difficult have these problems made it for you to do your work, take care of things at home, or get along with other people: Not difficult at all             Review of Systems   Constitutional: Negative for chills and fever.   HENT: Negative.    Eyes: Negative for visual disturbance.   Respiratory: Negative for cough and shortness of breath.    Cardiovascular: Negative for chest pain and peripheral edema.   Gastrointestinal: Negative for abdominal pain.   Endocrine: Negative for polydipsia and polyuria.   Genitourinary: Positive for vaginal discharge (with vaginal itching ).   Musculoskeletal: Negative.    Skin: Negative.    Allergic/Immunologic: Negative.    Neurological: Negative.    Hematological: Negative.    Psychiatric/Behavioral: Negative.    All other systems reviewed and are negative.           Objective    /76 (BP Location: Left arm, Patient  Position: Sitting, Cuff Size: Adult Regular)   Pulse 86   Temp 97.9  F (36.6  C) (Temporal)   Resp 16   Wt 104.3 kg (230 lb)   LMP 10/15/2022   SpO2 100%   BMI 34.97 kg/m    Body mass index is 34.97 kg/m .  Physical Exam  Vitals reviewed.   Constitutional:       General: She is not in acute distress.     Appearance: Normal appearance.   HENT:      Head: Normocephalic.      Right Ear: Tympanic membrane, ear canal and external ear normal.      Left Ear: Tympanic membrane, ear canal and external ear normal.      Nose: Nose normal.      Mouth/Throat:      Mouth: Mucous membranes are moist.      Pharynx: No posterior oropharyngeal erythema.   Eyes:      Extraocular Movements: Extraocular movements intact.      Conjunctiva/sclera: Conjunctivae normal.      Pupils: Pupils are equal, round, and reactive to light.   Cardiovascular:      Rate and Rhythm: Normal rate and regular rhythm.      Pulses: Normal pulses.      Heart sounds: Normal heart sounds. No murmur heard.  Pulmonary:      Effort: Pulmonary effort is normal.      Breath sounds: Normal breath sounds.   Abdominal:      Palpations: Abdomen is soft. There is no mass.      Tenderness: There is no abdominal tenderness. There is no guarding or rebound.   Genitourinary:     Comments: PELVIC EXAM:External genitaliamild erythema  Vaginal mucosa normal  Vaginal discharge: none   Speculum exam shows a normal appearing cervix .   Bimanual exam: Cervix closed, firm, non tender  to motion.  Uterus  firm, regular, mobile, non tender to palpation. No adnexal masses or tenderness.     Musculoskeletal:         General: No deformity. Normal range of motion.      Cervical back: Normal range of motion and neck supple.   Lymphadenopathy:      Cervical: No cervical adenopathy.   Skin:     General: Skin is warm and dry.   Neurological:      General: No focal deficit present.      Mental Status: She is alert.   Psychiatric:         Mood and Affect: Mood normal.         Behavior:  Behavior normal.            Results for orders placed or performed in visit on 12/07/22   Hemoglobin A1c     Status: Normal   Result Value Ref Range    Hemoglobin A1C 5.6 0.0 - 5.6 %   Lipid Profile (Chol, Trig, HDL, LDL calc)     Status: Normal   Result Value Ref Range    Cholesterol 178 <200 mg/dL    Triglycerides 82 <150 mg/dL    Direct Measure HDL 93 >=50 mg/dL    LDL Cholesterol Calculated 69 <=100 mg/dL    Non HDL Cholesterol 85 <130 mg/dL    Narrative    Cholesterol  Desirable:  <200 mg/dL    Triglycerides  Normal:  Less than 150 mg/dL  Borderline High:  150-199 mg/dL  High:  200-499 mg/dL  Very High:  Greater than or equal to 500 mg/dL    Direct Measure HDL  Female:  Greater than or equal to 50 mg/dL   Male:  Greater than or equal to 40 mg/dL    LDL Cholesterol  Desirable:  <100mg/dL  Above Desirable:  100-129 mg/dL   Borderline High:  130-159 mg/dL   High:  160-189 mg/dL   Very High:  >= 190 mg/dL    Non HDL Cholesterol  Desirable:  130 mg/dL  Above Desirable:  130-159 mg/dL  Borderline High:  160-189 mg/dL  High:  190-219 mg/dL  Very High:  Greater than or equal to 220 mg/dL   Estradiol     Status: None   Result Value Ref Range    Estradiol 234 pg/mL   Follicle stimulating hormone     Status: None   Result Value Ref Range    FSH 9.6 mIU/mL   ABO and Rh     Status: None   Result Value Ref Range    ABO/RH(D) O POS     SPECIMEN EXPIRATION DATE [id number]    Wet prep - Clinic Collect     Status: Abnormal    Specimen: Vagina; Swab   Result Value Ref Range    Trichomonas Absent Absent    Yeast Absent Absent    Clue Cells Absent Absent    WBCs/high power field 3+ (A) None   Chlamydia trachomatis/Neisseria gonorrhoeae by PCR - Clinic Collect     Status: Normal    Specimen: Cervix; Swab   Result Value Ref Range    Chlamydia Trachomatis Negative Negative    Neisseria gonorrhoeae Negative Negative

## 2022-12-08 LAB
C TRACH DNA SPEC QL PROBE+SIG AMP: NEGATIVE
N GONORRHOEA DNA SPEC QL NAA+PROBE: NEGATIVE

## 2022-12-11 ASSESSMENT — ENCOUNTER SYMPTOMS
MUSCULOSKELETAL NEGATIVE: 1
POLYDIPSIA: 0
SHORTNESS OF BREATH: 0
PSYCHIATRIC NEGATIVE: 1
NEUROLOGICAL NEGATIVE: 1
HEMATOLOGIC/LYMPHATIC NEGATIVE: 1
COUGH: 0
CHILLS: 0
ALLERGIC/IMMUNOLOGIC NEGATIVE: 1
FEVER: 0
ABDOMINAL PAIN: 0

## 2022-12-12 LAB
BKR LAB AP GYN ADEQUACY: NORMAL
BKR LAB AP GYN INTERPRETATION: NORMAL
BKR LAB AP HPV REFLEX: NORMAL
BKR LAB AP LMP: NORMAL
BKR LAB AP PREVIOUS ABNORMAL: NORMAL
PATH REPORT.COMMENTS IMP SPEC: NORMAL
PATH REPORT.COMMENTS IMP SPEC: NORMAL
PATH REPORT.RELEVANT HX SPEC: NORMAL

## 2022-12-14 ENCOUNTER — PATIENT OUTREACH (OUTPATIENT)
Dept: FAMILY MEDICINE | Facility: CLINIC | Age: 42
End: 2022-12-14

## 2022-12-14 LAB
HUMAN PAPILLOMA VIRUS 16 DNA: NEGATIVE
HUMAN PAPILLOMA VIRUS 18 DNA: NEGATIVE
HUMAN PAPILLOMA VIRUS FINAL DIAGNOSIS: ABNORMAL
HUMAN PAPILLOMA VIRUS OTHER HR: POSITIVE

## 2023-03-28 ENCOUNTER — VIRTUAL VISIT (OUTPATIENT)
Dept: FAMILY MEDICINE | Facility: CLINIC | Age: 43
End: 2023-03-28
Payer: COMMERCIAL

## 2023-03-28 DIAGNOSIS — D50.0 IRON DEFICIENCY ANEMIA DUE TO CHRONIC BLOOD LOSS: ICD-10-CM

## 2023-03-28 DIAGNOSIS — F41.1 GENERALIZED ANXIETY DISORDER: Primary | ICD-10-CM

## 2023-03-28 DIAGNOSIS — D64.9 ANEMIA, UNSPECIFIED TYPE: ICD-10-CM

## 2023-03-28 DIAGNOSIS — E53.8 VITAMIN B12 DEFICIENCY (NON ANEMIC): ICD-10-CM

## 2023-03-28 DIAGNOSIS — E66.9 OBESITY, UNSPECIFIED CLASSIFICATION, UNSPECIFIED OBESITY TYPE, UNSPECIFIED WHETHER SERIOUS COMORBIDITY PRESENT: ICD-10-CM

## 2023-03-28 DIAGNOSIS — Z98.84 HISTORY OF ROUX-EN-Y GASTRIC BYPASS: ICD-10-CM

## 2023-03-28 DIAGNOSIS — F33.0 MILD EPISODE OF RECURRENT MAJOR DEPRESSIVE DISORDER (H): ICD-10-CM

## 2023-03-28 PROCEDURE — 99214 OFFICE O/P EST MOD 30 MIN: CPT | Mod: VID | Performed by: PHYSICIAN ASSISTANT

## 2023-03-28 RX ORDER — CITALOPRAM HYDROBROMIDE 20 MG/1
20 TABLET ORAL DAILY
Qty: 90 TABLET | Refills: 1 | Status: SHIPPED | OUTPATIENT
Start: 2023-03-28 | End: 2024-01-15

## 2023-03-28 ASSESSMENT — ANXIETY QUESTIONNAIRES
8. IF YOU CHECKED OFF ANY PROBLEMS, HOW DIFFICULT HAVE THESE MADE IT FOR YOU TO DO YOUR WORK, TAKE CARE OF THINGS AT HOME, OR GET ALONG WITH OTHER PEOPLE?: NOT DIFFICULT AT ALL
2. NOT BEING ABLE TO STOP OR CONTROL WORRYING: SEVERAL DAYS
IF YOU CHECKED OFF ANY PROBLEMS ON THIS QUESTIONNAIRE, HOW DIFFICULT HAVE THESE PROBLEMS MADE IT FOR YOU TO DO YOUR WORK, TAKE CARE OF THINGS AT HOME, OR GET ALONG WITH OTHER PEOPLE: NOT DIFFICULT AT ALL
7. FEELING AFRAID AS IF SOMETHING AWFUL MIGHT HAPPEN: NOT AT ALL
GAD7 TOTAL SCORE: 4
GAD7 TOTAL SCORE: 4
7. FEELING AFRAID AS IF SOMETHING AWFUL MIGHT HAPPEN: NOT AT ALL
4. TROUBLE RELAXING: NOT AT ALL
6. BECOMING EASILY ANNOYED OR IRRITABLE: SEVERAL DAYS
GAD7 TOTAL SCORE: 4
1. FEELING NERVOUS, ANXIOUS, OR ON EDGE: SEVERAL DAYS
3. WORRYING TOO MUCH ABOUT DIFFERENT THINGS: SEVERAL DAYS
5. BEING SO RESTLESS THAT IT IS HARD TO SIT STILL: NOT AT ALL

## 2023-03-28 ASSESSMENT — ENCOUNTER SYMPTOMS
NERVOUS/ANXIOUS: 1
SLEEP DISTURBANCE: 0
MUSCULOSKELETAL NEGATIVE: 1
GASTROINTESTINAL NEGATIVE: 1
HYPERACTIVE: 0
NEUROLOGICAL NEGATIVE: 1
RESPIRATORY NEGATIVE: 1

## 2023-03-28 ASSESSMENT — PATIENT HEALTH QUESTIONNAIRE - PHQ9
10. IF YOU CHECKED OFF ANY PROBLEMS, HOW DIFFICULT HAVE THESE PROBLEMS MADE IT FOR YOU TO DO YOUR WORK, TAKE CARE OF THINGS AT HOME, OR GET ALONG WITH OTHER PEOPLE: SOMEWHAT DIFFICULT
SUM OF ALL RESPONSES TO PHQ QUESTIONS 1-9: 5
SUM OF ALL RESPONSES TO PHQ QUESTIONS 1-9: 5

## 2023-03-28 NOTE — PROGRESS NOTES
Katarina is a 43 year old who is being evaluated via a billable video visit.      How would you like to obtain your AVS? MyChart  If the video visit is dropped, the invitation should be resent by: Text to cell phone: [phone]  Will anyone else be joining your video visit? No          Assessment & Plan       ICD-10-CM    1. Generalized anxiety disorder  F41.1 citalopram (CELEXA) 20 MG tablet      2. Mild episode of recurrent major depressive disorder (H)  F33.0       3. History of Rubén-en-Y gastric bypass  Z98.84 Adult Comprehensive Weight Management  Referral      4. Obesity, unspecified classification, unspecified obesity type, unspecified whether serious comorbidity present  E66.9 Adult Comprehensive Weight Management  Referral      5. Anemia, unspecified type  D64.9       6. Vitamin B12 deficiency (non anemic)  E53.8       7. Iron deficiency anemia due to chronic blood loss  D50.0         #1 anxiety  #2 depression  She is currently on citalopram 20 mg daily.  She feels that her mood is stable at this time.  She does not feel that she needs medication adjustment.  No side effects of the medication.    #3 iron deficiency anemia  Last iron level was low 12/2021.  I did recommend restarting iron supplements.  Her last hemoglobin 12/2021 was stable at 12.3    #4 B12 deficiency  She is to continue with sublingual B12 supplements.  Last B12 level 12/15/2021 was stable at 248.    #5 vitamin D deficiency  She will be more consistent with vitamin D supplements.  Her last vitamin D level 12/15/2021 was low at 15.  We will recheck this during her next visit    #6 history of Rubén-en-Y  She did undergo gastric bypass in 2004 she has lost weight and Most of it off.  Her current weight is 230 pounds.  She since her gastric bypass she does have a deficiency in B12, iron, and vitamin D.  She will be continuing the supplements and we will recheck her labs during her next visit    #7 encounter for  contraceptives  She is interested in the Nexplanon.  She will make an appointment to have a consultation about Nexplanon.    #8 obesity  Weight is currently 230 pounds.  She did undergo a Rubén-en-Y about 19 years ago.  She would like to discuss diet exercise changes and weight loss with more comprehensive weight loss clinic.  Referral has been placed.    #9 GERD  Using over-the-counter omeprazole      She will be follow-up in December 2023 for annual physical.  Sooner if needed  56}     Dustin Ramirez PA-C  [facility]    Scar Bray is a 43 year old, presenting for the following health issues:  Recheck Medication (Med check. ) and Establish Care (Pt wanting to start birth control. Wants to discuss options. )    Additional Questions 3/28/2023   Roomed by Maribell BARKER LPN   Accompanied by -     Katarina is a pleasant 43-year-old female that presents via video visit to establish care.  Past medical history significant for anxiety, reflux, seasonal allergies, and gastric bypass proximally 19 years ago.  Due to her gastric bypass she does have deficiency in B12, iron, and vitamin D.  She does take supplements but she is not taking it regularly.  She does take a multivitamin.    She is also interested in starting on contraceptive.  She is sexually active and does not wish to have children.  She is interested in a Nexplanon.  She is not a current smoker.    History of Present Illness       Reason for visit:  Med Check and would like to get on birth control    She eats 0-1 servings of fruits and vegetables daily.She consumes 0 sweetened beverage(s) daily.She exercises with enough effort to increase her heart rate 9 or less minutes per day.  She exercises with enough effort to increase her heart rate 3 or less days per week. She is missing 2 dose(s) of medications per week.  She is not taking prescribed medications regularly due to other.    Today's PHQ-9         PHQ-9 Total Score:  5    PHQ-9 Q9 Thoughts of better off dead/self-harm past 2 weeks :   Not at all    How difficult have these problems made it for you to do your work, take care of things at home, or get along with other people: Somewhat difficult  Today's CARLOS-7 Score: 4         Review of Systems   HENT: Negative.    Respiratory: Negative.    Gastrointestinal: Negative.    Musculoskeletal: Negative.    Neurological: Negative.    Psychiatric/Behavioral: Negative for mood changes, self-injury, sleep disturbance and suicidal ideas. The patient is nervous/anxious. The patient is not hyperactive.          Objective         Vitals:  No vitals were obtained today due to virtual visit.    Physical Exam  Vitals and nursing note reviewed.        GENERAL: Healthy, alert and no distress  EYES: Eyes grossly normal to inspection.  No discharge or erythema, or obvious scleral/conjunctival abnormalities.  RESP: No audible wheeze, cough, or visible cyanosis.  No visible retractions or increased work of breathing.    SKIN: Visible skin clear. No significant rash, abnormal pigmentation or lesions.  NEURO: Cranial nerves grossly intact.  Mentation and speech appropriate for age.  PSYCH: Mentation appears normal, affect normal/bright, judgement and insight intact, normal speech and appearance well-groomed.          Video-Visit Details    Type of service:  Video Visit     Originating Location (pt. Location): Home    Distant Location (provider location):  On-site  Platform used for Video Visit: Pipette

## 2023-04-15 ENCOUNTER — HEALTH MAINTENANCE LETTER (OUTPATIENT)
Age: 43
End: 2023-04-15

## 2023-07-30 ASSESSMENT — SLEEP AND FATIGUE QUESTIONNAIRES
HOW LIKELY ARE YOU TO NOD OFF OR FALL ASLEEP WHILE LYING DOWN TO REST IN THE AFTERNOON WHEN CIRCUMSTANCES PERMIT: HIGH CHANCE OF DOZING
HOW LIKELY ARE YOU TO NOD OFF OR FALL ASLEEP WHILE WATCHING TV: MODERATE CHANCE OF DOZING
HOW LIKELY ARE YOU TO NOD OFF OR FALL ASLEEP IN A CAR, WHILE STOPPED FOR A FEW MINUTES IN TRAFFIC: WOULD NEVER DOZE
HOW LIKELY ARE YOU TO NOD OFF OR FALL ASLEEP WHILE SITTING QUIETLY AFTER LUNCH WITHOUT ALCOHOL: SLIGHT CHANCE OF DOZING
HOW LIKELY ARE YOU TO NOD OFF OR FALL ASLEEP WHEN YOU ARE A PASSENGER IN A CAR FOR AN HOUR WITHOUT A BREAK: WOULD NEVER DOZE
HOW LIKELY ARE YOU TO NOD OFF OR FALL ASLEEP WHILE SITTING AND READING: SLIGHT CHANCE OF DOZING
HOW LIKELY ARE YOU TO NOD OFF OR FALL ASLEEP WHILE SITTING AND TALKING TO SOMEONE: WOULD NEVER DOZE
HOW LIKELY ARE YOU TO NOD OFF OR FALL ASLEEP WHILE SITTING INACTIVE IN A PUBLIC PLACE: WOULD NEVER DOZE

## 2023-07-30 NOTE — PROGRESS NOTES
"New Medical Weight Management Consult    PATIENT:  Katarina Mcclain  MRN:         [id number]  :         1980  LARS:         2023        I had the pleasure of seeing your patient, Katarina Mcclain. Full intake/assessment was done to determine barriers to weight loss success and develop a treatment plan. Katarina Mcclain is a 43 year old female interested in treatment of medical problems associated with excess weight. She has a height of 5' 7\", a weight of 217 lbs 8 oz, and the calculated Body mass index is 34.07 kg/m .    Katarina is a patient with {Weight problem:129027296} {WITH/WITHOUT:129973::\"with\"} significant element of familial/genetic influence and {WITH/WITHOUT:032939::\"with\"} current health consequences. She {DOES_WM:577401::\"does not\"} need aggressive weight loss plan due to ***.  Katarina Mcclain {Eating habits:723213}.      Her problem is complicated by {Wt Mgmt complications:709348}      Review of the patient's history and habits today suggest that weight gain has been ***.    Previous Interventions found to be helpful in the past for weight loss include ***.    We discussed a toolbox approach to weight management today and she is open to combining mindful, reduced calorie dietary therapy with increased mindfulness techniques, activity/exercise improvements to optimize their current and future health.  Medication assistance for appetite control was discussed today and on review of the risks/benefits for this patients health history, we've decided to start {START/AVOID:597837::\"with\"} appetite suppressant therapy.    ASSESSMENT AND PLAN  Problem List Items Addressed This Visit    None  Visit Diagnoses       History of Rubén-en-Y gastric bypass        Obesity, unspecified classification, unspecified obesity type, unspecified whether serious comorbidity present                     { E&M time:037843}        She has the following co-morbidities:        2023     3:59 PM   --   I have the " "following health issues associated with obesity None of the above   I have the following symptoms associated with obesity Knee Pain    Back Pain    Fatigue            No data to display                    7/30/2023     3:59 PM   Referring Provider   Please name the provider who referred you to Medical Weight Management  If you do not know, please answer \"I Don't Know\" Dustin Ramirez PA-C           7/30/2023     3:59 PM   Weight History   How concerned are you about your weight? Somewhat Concerned   I became overweight As a Child   The following factors have contributed to my weight gain Mental Health Issues    Eating Wrong Types of Food    Lack of Exercise    Genetic (Runs in the Family)   I have tried the following methods to lose weight Medications    Weight Loss Surgery   My lowest weight since age 16 was 175   My highest weight since age 16 was 315   The most weight I have ever lost was (lbs) 140   I have the following family history of obesity/being overweight My mother is overweight    One or more of my siblings are overweight   How has your weight changed over the last year? Gained   How many pounds? 15           7/30/2023     3:59 PM   Diet Recall Review with Patient   If you do eat breakfast, what types of food do you eat? Meat, cheese and veggies omelet   If you do eat lunch, what types of food do you typically eat? leftovers   If you do eat supper, what types of food do you typically eat? anything   If you do snack, what types of food do you typically eat? Nuts and chips   How many glasses of juice do you drink in a typical day? 0   How many of glasses of milk do you drink in a typical day? 0   How many 8oz glasses of sugar containing drinks such as [employer]-A[employer]/sweet tea do you drink in a day? 0   How many cans/bottles of sugar pop/soda/tea/sports drinks do you drink in a day? 0   How many cans/bottles of diet pop/soda/tea or sports drink do you drink in a day? 0   How often do you have a drink of " alcohol? 4 or More Times a Week   If you do drink, how many drinks might you have in a day? 5-6   high reliance on alcohol comes from ***.         7/30/2023     3:59 PM   Eating Habits   Generally, my meals include foods like these bread, pasta, rice, potatoes, corn, crackers, sweet dessert, pop, or juice Everyday   Generally, my meals include foods like these fried meats, brats, burgers, french fries, pizza, cheese, chips, or ice cream A Few Times a Week   Eat fast food (like McDonalds, Burger Gilbert, Taco Bell) Less Than Weekly   Eat at a buffet or sit-down restaurant Once a Week   Eat most of my meals in front of the TV or computer Almost Everyday   Often skip meals, eat at random times, have no regular eating times A Few Times a Week   Rarely sit down for a meal but snack or graze throughout Almost Everyday   Eat extra snacks between meals Almost Everyday   Eat most of my food at the end of the day Less Than Weekly   Eat in the middle of the night or wake up at night to eat Almost Everyday   Eat extra snacks to prevent or correct low blood sugar Never   Eat to prevent acid reflux or stomach pain A Few Times a Week   Worry about not having enough food to eat Never   I eat when I am depressed Less Than Weekly   I eat when I am stressed Less Than Weekly   I eat when I am bored A Few Times a Week   I eat when I am anxious Less Than Weekly   I eat when I am happy or as a reward A Few Times a Week   I feel hungry all the time even if I just have eaten Never   Feeling full is important to me Once a Week   I finish all the food on my plate even if I am already full Never   I can't resist eating delicious food or walk past the good food/smell Never   I eat/snack without noticing that I am eating Everyday   I eat when I am preparing the meal Everyday   I eat more than usual when I see others eating Less Than Weekly   I have trouble not eating sweets, ice cream, cookies, or chips if they are around the house Never   I think  about food all day Less Than Weekly   What foods, if any, do you crave? Chips/Crackers     distracated and mindless eating can be a problem for her as well as liquid calories from alcohol.       7/30/2023     3:59 PM   Amount of Food   I feel out of control when eating Never   I eat a large amount of food, like a loaf of bread, a box of cookies, a pint/quart of ice cream, all at once Never   I eat a large amount of food even when I am not hungry Never   I eat rapidly Never   I eat alone because I feel embarrassed and do not want others to see how much I have eaten Never   I eat until I am uncomfortably full Weekly   I feel bad, disgusted, or guilty after I overeat Never           7/30/2023     3:59 PM   Activity/Exercise History   How much of a typical 12 hour day do you spend sitting? Most of the Day   How much of a typical 12 hour day do you spend lying down? Less Than Half the Day   How much of a typical day do you spend walking/standing? Less Than Half the Day   How many hours (not including work) do you spend on the TV/Video Games/Computer/Tablet/Phone? 2-3 Hours   How many times a week are you active for the purpose of exercise? Never   What keeps you from being more active? Unsure What To Do   How many total minutes do you spend doing some activity for the purpose of exercising when you exercise? More Than 30 Minutes       PAST MEDICAL HISTORY:  Past Medical History:   Diagnosis Date    Allergic rhinitis     Created by Conversion Replacement Utility updated for latest IMO load     Allergic rhinitis, cause unspecified     seasonal allergies    Anemia     Created by Conversion     Anxiety     Asthma     Carbuncle and furuncle of trunk 4/2001    R breast    Cervical high risk HPV (human papillomavirus) test positive 12/14/2022 08/23/17 NIL Pap, Neg HR HPV  12/7/22 NIL Pap, + HR HPV (not 16 or 18) Plan cotest in 1 year due 12/7/23 12/14/22 Pt was advised.     Generalized anxiety disorder     Created by  Conversion     Hidradenitis 4/95    Hypertrophy of bone 10/9/2019    Added automatically from request for surgery [id number]     Mild major depression (H) 10/25/2018    Obesity, unspecified     Oral Allergy Syndrome     Created by Conversion     Pars defect of lumbar spine 5/17/2016    Personal history of tobacco use, presenting hazards to health     Scheuermann's kyphosis 5/17/2016    Vitamin B12 Deficiency     Created by Conversion     Vitamin D deficiency     Created by Conversion Replacement Utility updated for latest IMO load            7/30/2023     3:59 PM   Work/Social History Reviewed With Patient   My employment status is Full-Time   My job is [employer] and    How much of your job is spent on the computer or phone? 100%   How many hours do you spend commuting to work daily? 0   What is your marital status? /In a Relationship   If in a relationship, is your significant other overweight? Yes   Who do you live with? Myself   Who does the food shopping? I do           7/30/2023     3:59 PM   Mental Health History Reviewed With Patient   Have you ever been physically or sexually abused? No   How often in the past 2 weeks have you felt little interest or pleasure in doing things? Not at all   Over the past 2 weeks how often have you felt down, depressed, or hopeless? Not at all           7/30/2023     3:59 PM   Sleep History Reviewed With Patient   How many hours do you sleep at night? 8       Past Surgical History:   Procedure Laterality Date    REVISION AMADOU-EN-Y      ZZC GASTRIC BYPASS,OBESE<100CM AMADOU-EN-Y  2/27/04    [facility]       Social History     Socioeconomic History    Marital status: Single     Spouse name: Not on file    Number of children: 0    Years of education: Not on file    Highest education level: Not on file   Occupational History    Not on file   Tobacco Use    Smoking status: Former     Types: Cigarettes     Start date: 9/3/2014      Passive exposure: Past    Smokeless tobacco: Never    Tobacco comments:     no passive exposure   Vaping Use    Vaping Use: Never used   Substance and Sexual Activity    Alcohol use: Yes     Comment: Alcoholic Drinks/day: weekends    Drug use: No    Sexual activity: Not on file   Other Topics Concern     Service Not Asked    Blood Transfusions Not Asked    Caffeine Concern No    Occupational Exposure Not Asked    Hobby Hazards Not Asked    Sleep Concern Not Asked    Stress Concern Not Asked    Weight Concern Not Asked    Special Diet Not Asked    Back Care Not Asked    Exercise No    Bike Helmet Not Asked    Seat Belt Yes    Self-Exams No   Social History Narrative    Not on file     Social Determinants of Health     Financial Resource Strain: Not on file   Food Insecurity: Not on file   Transportation Needs: Not on file   Physical Activity: Not on file   Stress: Not on file   Social Connections: Not on file   Intimate Partner Violence: Not on file   Housing Stability: Not on file       MEDICATIONS:   Current Outpatient Medications   Medication Sig Dispense Refill    cetirizine (ZYRTEC) 5 MG tablet Take 5 mg by mouth daily      citalopram (CELEXA) 20 MG tablet Take 1 tablet (20 mg) by mouth daily 90 tablet 1    multivitamin w/minerals (MULTI-VITAMIN) tablet Take 1 tablet by mouth daily      omeprazole (PRILOSEC OTC) 20 MG EC tablet Take 20 mg by mouth daily         ALLERGIES:   Allergies   Allergen Reactions    No Known Allergies     Other Environmental Allergy Other (See Comments)     Seasonal-ragweed, birch, dust mites    Oxycodone-Acetaminophen Nausea and Vomiting     Vitamin D Deficiency Screening Results:  Lab Results   Component Value Date    VITDT 15 (L) 12/15/2021      TSH   Date Value Ref Range Status   12/15/2021 1.12 0.30 - 5.00 uIU/mL Final     Hx of low B12 at 248pg/mL 12/15/21.    {Diagnostic Test Results (Optional):092888}    PHYSICAL EXAM:  Objective    /82 (BP Location: Right arm,  "Patient Position: Sitting)   Ht 1.702 m (5' 7\")   Wt 98.7 kg (217 lb 8 oz)   BMI 34.07 kg/m    /82 (BP Location: Right arm, Patient Position: Sitting)   Ht 1.702 m (5' 7\")   Wt 98.7 kg (217 lb 8 oz)   BMI 34.07 kg/m    Body mass index is 34.07 kg/m .  Physical Exam   {Exam List (Optional):669892}        Sincerely,    Malvin Vogel MD        "

## 2023-07-31 ENCOUNTER — VIRTUAL VISIT (OUTPATIENT)
Dept: SURGERY | Facility: CLINIC | Age: 43
End: 2023-07-31
Payer: COMMERCIAL

## 2023-07-31 ENCOUNTER — APPOINTMENT (OUTPATIENT)
Dept: LAB | Facility: CLINIC | Age: 43
End: 2023-07-31
Payer: COMMERCIAL

## 2023-07-31 ENCOUNTER — OFFICE VISIT (OUTPATIENT)
Dept: SURGERY | Facility: CLINIC | Age: 43
End: 2023-07-31
Payer: COMMERCIAL

## 2023-07-31 VITALS
DIASTOLIC BLOOD PRESSURE: 82 MMHG | SYSTOLIC BLOOD PRESSURE: 106 MMHG | HEIGHT: 67 IN | WEIGHT: 217.5 LBS | BODY MASS INDEX: 34.14 KG/M2

## 2023-07-31 DIAGNOSIS — E66.9 OBESITY (BMI 30-39.9): Primary | ICD-10-CM

## 2023-07-31 DIAGNOSIS — R53.83 OTHER FATIGUE: ICD-10-CM

## 2023-07-31 DIAGNOSIS — E66.9 OBESITY, UNSPECIFIED CLASSIFICATION, UNSPECIFIED OBESITY TYPE, UNSPECIFIED WHETHER SERIOUS COMORBIDITY PRESENT: ICD-10-CM

## 2023-07-31 DIAGNOSIS — Z13.21 ENCOUNTER FOR VITAMIN DEFICIENCY SCREENING: ICD-10-CM

## 2023-07-31 DIAGNOSIS — Z98.84 HISTORY OF ROUX-EN-Y GASTRIC BYPASS: ICD-10-CM

## 2023-07-31 DIAGNOSIS — D50.8 OTHER IRON DEFICIENCY ANEMIA: ICD-10-CM

## 2023-07-31 DIAGNOSIS — Z72.0 NICOTINE ABUSE: ICD-10-CM

## 2023-07-31 DIAGNOSIS — K91.2 POSTOPERATIVE MALABSORPTION: ICD-10-CM

## 2023-07-31 DIAGNOSIS — K91.2 POSTOPERATIVE MALABSORPTION: Primary | ICD-10-CM

## 2023-07-31 LAB
ALBUMIN SERPL BCG-MCNC: 4.3 G/DL (ref 3.5–5.2)
ALP SERPL-CCNC: 75 U/L (ref 35–104)
ALT SERPL W P-5'-P-CCNC: 20 U/L (ref 0–50)
ANION GAP SERPL CALCULATED.3IONS-SCNC: 12 MMOL/L (ref 7–15)
AST SERPL W P-5'-P-CCNC: 26 U/L (ref 0–45)
BILIRUB SERPL-MCNC: 0.4 MG/DL
BUN SERPL-MCNC: 8.7 MG/DL (ref 6–20)
CALCIUM SERPL-MCNC: 9 MG/DL (ref 8.6–10)
CHLORIDE SERPL-SCNC: 105 MMOL/L (ref 98–107)
CREAT SERPL-MCNC: 0.58 MG/DL (ref 0.51–0.95)
DEPRECATED CALCIDIOL+CALCIFEROL SERPL-MC: 29 UG/L (ref 20–75)
DEPRECATED HCO3 PLAS-SCNC: 23 MMOL/L (ref 22–29)
ERYTHROCYTE [DISTWIDTH] IN BLOOD BY AUTOMATED COUNT: 20 % (ref 10–15)
FERRITIN SERPL-MCNC: 11 NG/ML (ref 6–175)
FOLATE SERPL-MCNC: 24.7 NG/ML (ref 4.6–34.8)
GFR SERPL CREATININE-BSD FRML MDRD: >90 ML/MIN/1.73M2
GLUCOSE SERPL-MCNC: 97 MG/DL (ref 70–99)
HCT VFR BLD AUTO: 33.5 % (ref 35–47)
HGB BLD-MCNC: 10.4 G/DL (ref 11.7–15.7)
IRON BINDING CAPACITY (ROCHE): 472 UG/DL (ref 240–430)
IRON SATN MFR SERPL: 4 % (ref 15–46)
IRON SERPL-MCNC: 21 UG/DL (ref 37–145)
MCH RBC QN AUTO: 23.3 PG (ref 26.5–33)
MCHC RBC AUTO-ENTMCNC: 31 G/DL (ref 31.5–36.5)
MCV RBC AUTO: 75 FL (ref 78–100)
PLATELET # BLD AUTO: 313 10E3/UL (ref 150–450)
POTASSIUM SERPL-SCNC: 4.6 MMOL/L (ref 3.4–5.3)
PROT SERPL-MCNC: 7.6 G/DL (ref 6.4–8.3)
PTH-INTACT SERPL-MCNC: 41 PG/ML (ref 15–65)
RBC # BLD AUTO: 4.46 10E6/UL (ref 3.8–5.2)
SODIUM SERPL-SCNC: 140 MMOL/L (ref 136–145)
TSH SERPL DL<=0.005 MIU/L-ACNC: 0.72 UIU/ML (ref 0.3–4.2)
VIT B12 SERPL-MCNC: 509 PG/ML (ref 232–1245)
WBC # BLD AUTO: 8.1 10E3/UL (ref 4–11)

## 2023-07-31 PROCEDURE — 97802 MEDICAL NUTRITION INDIV IN: CPT | Mod: VID

## 2023-07-31 PROCEDURE — 83970 ASSAY OF PARATHORMONE: CPT | Mod: VID

## 2023-07-31 PROCEDURE — 83550 IRON BINDING TEST: CPT | Mod: VID

## 2023-07-31 PROCEDURE — 84425 ASSAY OF VITAMIN B-1: CPT | Mod: 90

## 2023-07-31 PROCEDURE — 82746 ASSAY OF FOLIC ACID SERUM: CPT | Mod: VID

## 2023-07-31 PROCEDURE — 84443 ASSAY THYROID STIM HORMONE: CPT | Mod: VID

## 2023-07-31 PROCEDURE — 80053 COMPREHEN METABOLIC PANEL: CPT | Mod: VID

## 2023-07-31 PROCEDURE — 84590 ASSAY OF VITAMIN A: CPT | Mod: 90

## 2023-07-31 PROCEDURE — 36415 COLL VENOUS BLD VENIPUNCTURE: CPT | Mod: VID

## 2023-07-31 PROCEDURE — 83540 ASSAY OF IRON: CPT | Mod: VID

## 2023-07-31 PROCEDURE — 82607 VITAMIN B-12: CPT | Mod: VID

## 2023-07-31 PROCEDURE — 99000 SPECIMEN HANDLING OFFICE-LAB: CPT | Mod: VID

## 2023-07-31 PROCEDURE — 99214 OFFICE O/P EST MOD 30 MIN: CPT | Performed by: EMERGENCY MEDICINE

## 2023-07-31 PROCEDURE — 82306 VITAMIN D 25 HYDROXY: CPT | Mod: VID

## 2023-07-31 PROCEDURE — 82728 ASSAY OF FERRITIN: CPT | Mod: VID

## 2023-07-31 PROCEDURE — 84630 ASSAY OF ZINC: CPT | Mod: 90

## 2023-07-31 PROCEDURE — 85027 COMPLETE CBC AUTOMATED: CPT | Mod: VID

## 2023-07-31 RX ORDER — MULTIPLE VITAMINS W/ MINERALS TAB 9MG-400MCG
1 TAB ORAL DAILY
COMMUNITY

## 2023-07-31 RX ORDER — CETIRIZINE HYDROCHLORIDE 10 MG/1
10 TABLET ORAL DAILY
COMMUNITY

## 2023-07-31 RX ORDER — OMEPRAZOLE 20 MG/1
20 TABLET, DELAYED RELEASE ORAL 2 TIMES DAILY
COMMUNITY
End: 2024-01-31

## 2023-07-31 RX ORDER — SUCRALFATE 1 G/1
1 TABLET ORAL 4 TIMES DAILY
Qty: 40 TABLET | Refills: 0 | Status: SHIPPED | OUTPATIENT
Start: 2023-07-31 | End: 2023-08-07

## 2023-07-31 NOTE — LETTER
7/31/2023         RE: Katarina Mcclain  [address] [place] [address]        Dear Colleague,    Thank you for referring your patient, Katarina Mcclain, to the [facility] [place]. Please see a copy of my visit note below.    Katarina Mcclain is a 43 year old who is being evaluated via a billable video visit.      How would you like to obtain your AVS? MyChart  If the video visit is dropped, the invitation should be resent by: Text to cell phone: [phone]  Will anyone else be joining your video visit? No          Medical Weight Loss Initial Diet Evaluation- hx with RYGB  Assessment:  This patient was referred by Dr. Vogel for MNT as treatment for Obesity. Patient is 19 years s/p  Rubén en Y Gastric Bypass with  Dr. Jeromy Son at [facility].   Katarina is presenting today for a new weight management nutrition consultation. Pt has had an initial appointment with Dr. Vogel.    Weight loss medication:  none .     Personal Goals: Re establishing post op nutritional guidelines.     Anthropometrics:    Pt's weight is 217 lbs  Initial weight: 217 lbs   Pre surgery 315 lbs  Most confortable weight 185 lbs  Weight change: 98 lbs down from pre surgery weight     BMI: 34.06 kg/m2    Ideal body weight: 61.6 kg (135 lb 12.9 oz)  Adjusted ideal body weight: 76.4 kg (168 lb 7.7 oz)  Estimated RMR (G[place]-St Jeor equation):  1660 kcals x 1.2 (sedentary) = 2000 kcals (for weight maintenance)      Recommended Protein Intake: 60-80 grams of protein/day    Medical History:  Patient Active Problem List   Diagnosis     Vitamin D Deficiency     Anemia     Generalized Anxiety Disorder     Oral Allergy Syndrome     Allergic Rhinitis     Vitamin B12 Deficiency     Scheuermann's kyphosis     Pars defect of lumbar spine     Mild major depression (H)     Hypertrophy of bone     Cervical high risk HPV (human papillomavirus) test positive          Nutrition History:   Food  "allergies/intolerances/cultural or religous food customs: Yes- tree allergy (hard core fruits - apples, peaches)    Weight loss history: Has hx of RYGB, was lost to follow up and has not followed the nutritional guidelines post op bar surgery. Is wanting to get back on track with nutritional habits.    Vitamins/Mineral Supplementation: Re establishing geovany vitamins     Eating protein first: No     fluid from meals: No     Meal duration: 15 minutes     Portion sizes per meal : 1 and 1/2 cup     Dietary Recall:  Breakfast 9am: Eggs with sausage or vegetables omelet or Wheat   Lunch: leftovers  Dinner: spaghetti, burgers - varies not always vegetables   Typical Snacks: cashews, cheeze-its (salty  foods)  Overnight eating: Yes  Eating out: 2x per week     Beverages: coffee    Exercise: 1 hour walk 3x per week     Nutrition Diagnosis (PES statement):     Obesity related to excessive energy intake as evidence by patient report of not following post of nutritional habits and BMI of 34.06 kg/m2     (NC-1.4) Altered GI Function related to Alteration in gastrointestinal tract structure and/or function/ Decreased functional length of the GI tract as evidenced by Weight loss of 31% initial body weight; Gastric bypass surgery    Intervention    Discussion  Discussed 18 months and beyond Post-Op Nutritional Guidelines for RYGB  Recommended to consume 15-20gm protein at 3 meals daily, along with protein supplement/\"planned protein containing snack\" of 15-30gm protein, to reach goal of 60-80 gm protein daily.  Educated on post-op vitamin regimen: Multi Vit + iron 2x/day, calcium citrate 400-600 mg 2x/day, 3180-7195 mcg of Sublingual B-12 daily, and 5000 IU Vitamin D3 daily (MVI and calcium can be taken at the same time BID)  Reviewed lean protein sources  Bariatric Plate Method-  including lean/low fat protein at each meal, including a vegetable/fruit, and limiting carbohydrate intake to less than 25% of plate volume. " "    Instructions  Include 15-20gm protein at each meal, along with protein supplement/\"planned protein containing snack\" of 15-30gm protein, to reach goal of 60-80 gm protein daily.  Increase fluid intake to 64oz daily: choose plain or calorie/carbonation-free beverages.  Incorporate daily structured activity, 30-60 minutes most days of the week  Recommended pt to start taking: Multi Vit + iron 2x/day, calcium citrate 400-600 mg 2x/day, 2737-0973 mcg of Sublingual B-12 daily, and 5000 IU Vitamin D3 daily. (MVI and calcium can be taken at the same time)  Read food labels more consistently: keeping total fat grams <10, total sugar grams <10, fiber >3gm per serving.  Increase vegetable/fruit intake, by having a vegetable or fruit with each meal daily.  Practice plate method: 1/2 plate lean/low fat protein source, vegetable/fruit, <25% of plate complex carbohydrates.  Separate fluids 30 minutes before/after meal times.  Practice eating off of smaller plates/bowls, chewing to applesauce consistency, taking 20-30 minutes to eat in a calm/relaxed environment without distractions of tv/email/cell phone.      Handouts provided:  18 months Post-Op Nutritional Guidelines for RYGB    Assessment/Plan:    Pt will follow up in 3 month(s) with bariatrician and make appointment with JAVON.      Video-Visit Details    Type of service:  Video Visit    Video Start Time (time video started): 11:18 am    Video End Time (time video stopped): 11:42 am    Originating Location (pt. Location): Home      Distant Location (provider location):  Off-site    Mode of Communication:  Video Conference via [facility]    Physician has received verbal consent for a Video Visit from the patient? Yes      Sharonda Theodore RD         Again, thank you for allowing me to participate in the care of your patient.        Sincerely,        Sharonda Theodore RD  "

## 2023-07-31 NOTE — PROGRESS NOTES
Katarina Mcclain is a 43 year old who is being evaluated via a billable video visit.      How would you like to obtain your AVS? MyChart  If the video visit is dropped, the invitation should be resent by: Text to cell phone: [phone]  Will anyone else be joining your video visit? No          Medical Weight Loss Initial Diet Evaluation- hx with RYGB  Assessment:  This patient was referred by Dr. Vogel for MNT as treatment for Obesity. Patient is 17 years s/p  Rub[place] en Y Gastric Bypass with  Dr. Jeromy Son at [place].   Katarina is presenting today for a new weight management nutrition consultation. Pt has had an initial appointment with Dr. Vogel.    Weight loss medication:  none .     Personal Goals: Re establishing post op nutritional guidelines.     Anthropometrics:    Pt's weight is 217 lbs  Initial weight: 217 lbs   Pre surgery 315 lbs  Most confortable weight 185 lbs  Weight change: 98 lbs down from pre surgery weight     BMI: 34.06 kg/m2    Ideal body weight: 61.6 kg (135 lb 12.9 oz)  Adjusted ideal body weight: 76.4 kg (168 lb 7.7 oz)  Estimated RMR ([place]-St Jeor equation):  1660 kcals x 1.2 (sedentary) = 2000 kcals (for weight maintenance)      Recommended Protein Intake: 60-80 grams of protein/day    Medical History:  Patient Active Problem List   Diagnosis    Vitamin D Deficiency    Anemia    Generalized Anxiety Disorder    Oral Allergy Syndrome    Allergic Rhinitis    Vitamin B12 Deficiency    Scheuermann's kyphosis    Pars defect of lumbar spine    Mild major depression (H)    Hypertrophy of bone    Cervical high risk HPV (human papillomavirus) test positive          Nutrition History:   Food allergies/intolerances/cultural or religous food customs: Yes- tree allergy (hard core fruits - apples, peaches)    Weight loss history: Has hx of RYGB, was lost to follow up and has not followed the nutritional guidelines post op bar surgery. Is wanting to get back on track with nutritional  "habits.    Vitamins/Mineral Supplementation: Re establishing geovany vitamins     Eating protein first: No     fluid from meals: No     Meal duration: 15 minutes     Portion sizes per meal : 1 and 1/2 cup     Dietary Recall:  Breakfast 9am: Eggs with sausage or vegetables omelet or Wheat   Lunch: leftovers  Dinner: spaghetti, burgers - varies not always vegetables   Typical Snacks: cashews, cheeze-its (salty  foods)  Overnight eating: Yes  Eating out: 2x per week     Beverages: coffee    Exercise: 1 hour walk 3x per week     Nutrition Diagnosis (PES statement):     Obesity related to excessive energy intake as evidence by patient report of not following post of nutritional habits and BMI of 34.06 kg/m2     (NC-1.4) Altered GI Function related to Alteration in gastrointestinal tract structure and/or function/ Decreased functional length of the GI tract as evidenced by Weight loss of 31% initial body weight; Gastric bypass surgery    Intervention    Discussion  Discussed 18 months and beyond Post-Op Nutritional Guidelines for RYGB  Recommended to consume 15-20gm protein at 3 meals daily, along with protein supplement/\"planned protein containing snack\" of 15-30gm protein, to reach goal of 60-80 gm protein daily.  Educated on post-op vitamin regimen: Multi Vit + iron 2x/day, calcium citrate 400-600 mg 2x/day, 6994-9860 mcg of Sublingual B-12 daily, and 5000 IU Vitamin D3 daily (MVI and calcium can be taken at the same time BID)  Reviewed lean protein sources  Bariatric Plate Method-  including lean/low fat protein at each meal, including a vegetable/fruit, and limiting carbohydrate intake to less than 25% of plate volume.     Instructions  Include 15-20gm protein at each meal, along with protein supplement/\"planned protein containing snack\" of 15-30gm protein, to reach goal of 60-80 gm protein daily.  Increase fluid intake to 64oz daily: choose plain or calorie/carbonation-free beverages.  Incorporate daily " structured activity, 30-60 minutes most days of the week  Recommended pt to start taking: Multi Vit + iron 2x/day, calcium citrate 400-600 mg 2x/day, 8291-2702 mcg of Sublingual B-12 daily, and 5000 IU Vitamin D3 daily. (MVI and calcium can be taken at the same time)  Read food labels more consistently: keeping total fat grams <10, total sugar grams <10, fiber >3gm per serving.  Increase vegetable/fruit intake, by having a vegetable or fruit with each meal daily.  Practice plate method: 1/2 plate lean/low fat protein source, vegetable/fruit, <25% of plate complex carbohydrates.  Separate fluids 30 minutes before/after meal times.  Practice eating off of smaller plates/bowls, chewing to applesauce consistency, taking 20-30 minutes to eat in a calm/relaxed environment without distractions of tv/email/cell phone.      Handouts provided:  18 months Post-Op Nutritional Guidelines for RYGB    Assessment/Plan:    Pt will follow up in 3 month(s) with bariatrician and make appointment with JAVON.      Video-Visit Details    Type of service:  Video Visit    Video Start Time (time video started): 11:18 am    Video End Time (time video stopped): 11:42 am    Originating Location (pt. Location): Home      Distant Location (provider location):  Off-site    Mode of Communication:  Video Conference via [facility]    Physician has received verbal consent for a Video Visit from the patient? Yes      Sharonda Theodore RD

## 2023-07-31 NOTE — PATIENT INSTRUCTIONS
Plan:  Welcome back to the Bariatric World, read through your manual to get back on track.  Check your Vitamin: it needs to have 18mg/serving of iron and at least 12mg/serving of zinc. You need  2 servings daily of a complete multivitamin with iron. B12 should be 1000mcg daily for now but often just 2-3 days weekly should provide good support. Vitmain D3 8094-0777 international unit(s)/day should work well if using calcium citrate 500mg daily for now.   Follow up with Dr. Hurtado. The invite to support group is below.  Check labs today and I'll message results next week.   Follow up with dietician.   Reduce beer.  Set a quit date for nicotine. There is high gastritis and ulcer risks with nicotine use.  If struggling, let me know and we can start an anticraving medication like naltrexone.  Recheck with me in about 2-3 months to set course/follow up how progressing.     [facility] Bariatric [facility]  Nutritional Guidelines  Gastric Bypass 18 Months Post Op and Beyond    General Guidelines and Helpful Hints:  Eat 3 meals per day + protein supplement(s). No snacks between meals.  Do not skip meals.  This can cause overeating at the next meal and will prevent adequate protein and nutritional intake.  Aim for 60-80 grams of protein per day.  Always eat your protein first. This assists with optimal nutrition and helps you stay full longer.  Depending on your portion size, you may need to drink approved protein supplement between meals to achieve protein goals. Follow recommendations of your Dietitian.   Eat your protein first, and then follow with fiber.   It is not necessary to count your fiber, but 15-20 grams per day is recommended.    Add fiber by including fruits, vegetables, whole grains, and beans.   Portions should remain about 1 cup per meal. Use measuring cups to be accurate.  Continue to use saucer/salad plates, infant/toddler silverware to keep portion sizes small and take small bites.  Eat S-L-O-W-L-Y to make each  "meal last 20-30 minutes. Always stop eating when satisfied.  Continue to use caution with foods containing skins, peels or membranes. Chew well!  Aim for 64 oz. of calorie-free fluids daily.  Continue to avoid caffeine and carbonation. If you choose to drink alcohol, do so in moderation.   Remember to avoid drinking during meals, 15-30 minutes before and 30 minutes after.  Exercise is thorne for continued weight loss and weight maintenance. 150 minutes weekly of moderate aerobic activity or 75 minutes of vigorous with 2 days or more a week of strength training. Try to get 20% or more of your steps each day at a brisk pace, as though hurrying to a bus stop. Look to get stronger this year.  If having trouble tolerating meat, try using a crock-pot, tinfoil tent, steamer or other moist cooking method to create tender meats. Add broth or low-fat gravy to help meat stay moist.   Avoid high sugar and high fat foods to prevent dumping syndrome.  Check nutrition labels for less than 10 grams of sugar and less than 10 grams of fat per serving.  Continue Taking Vitamins/Minerals:  1000 mcg of Sublingual B-12 at least 3 days weekly to average 350-500mcg/day. If using 2500mcg lozenges, 2 weekly.  If 5000 mcg, once weekly dosing works.  1 Complete Multivitamin with 18mg Iron twice daily (chewable or swallow tabs). Often sold as \"women's one a day\" if tablet but take twice daily.  500-600 mg Calcium Citrate twice daily (chewable or swallow tabs).  5000 IU Vitamin D3 daily.  If menstruating, you may need closer to 60mg of iron daily to prevent iron deficiency. An occasional \"boost\" of extra iron supplement during/after is reasonable if heavy flow.    Sample Grocery List    Protein:  Fat free Greek or light yogurt (less than 10 grams sugar)  Fat free or low-fat cottage cheese  String cheese or reduced fat cheese slices  Tuna, salmon, crab, egg, or chicken salad made with light or fat free mayonnaise  Egg or Egg Substitute  Lean/extra " lean turkey, beef, bison, venison (ground, sirloin, round, flank)  Pork loin or tenderloin (grilled, baked, broiled)  Fish such as salmon, tuna, trout, tilapia, etc. (grilled, baked, broiled)  Tender cuts of lean (skinless) turkey or chicken  Lean deli meats: turkey, lean ham, chicken, lean roast beef  Beans such as kidney, garbanzo, black, camacho, or low-fat/fat free refried beans  Peanut butter (natural preferred). Limit to 1 Tbsp. per day.  Low-fat meatloaf (made with lean ground beef or turkey)  Sloppy Joes made with low-sugar ketchup and lean ground beef or turkey  Soy or vegetable protein (i.e. vegan crumbles, soy/veggie burger, tofu)  Hummus    Vegetables:  Fresh: cooked or raw (as tolerated)  Frozen vegetables  Canned vegetables (low sodium or no salt added, rinse before cooking/eating)  (Ok to have skins/peels/membranes/seeds - just chew well)    Fruits:  Fresh fruit  Frozen fruit (no sugar added)  Canned fruit (packed in its own juice, NOT syrup)  (Ok to have skins/peels/membranes/seeds - just chew well)    Starch:  Unsweetened whole-grain hot cereal (or high fiber cold cereal, dry)  Toasted whole wheat bread or [place] Thins  Whole grain crackers  Baked /boiled/mashed potato/sweet potato  Cooked whole grain pasta, brown rice, or other cooked whole grains  Starchy vegetables: corn, peas, winter squash    Protein Supplement:   Ready to drink protein shake with:  15-30 grams protein per serving  Less than 10 grams total carbohydrate per serving   Protein powder mixed with:   Skim or 1% milk  Low fat or fat free Lactaid milk, plain or no sugar added soymilk  Water     Fats: (use in moderation)  1 teaspoon of soft tub margarine  1 teaspoon olive oil, canola oil, or peanut oil  1 tablespoon of low-fat garvey or salad dressing     Sample Menu for 18+ months after Gastric Bypass    You do NOT need to eat/drink the full portion sizes listed below  Always stop when you are satisfied    Breakfast   cup 1% cottage  cheese     cup mixed berries   Lunch 2 oz lean roast beef on   [place] Thin with 1 tsp. light garvey    small tomato, chopped, mixed with 1 tsp. light vinaigrette dressing   Supplement Approved protein supplement (if needed between meals)   Dinner 2 oz grilled salmon    cup salad greens with 1 tsp. light salad dressing and 1 tsp. ground flax seed    cup quinoa or brown rice     Breakfast   cup egg substitute with   cup sautéed chopped vegetables  2 light [place] Krisp crackers   Lunch Tuna Melt:   cup tuna mixed with 1 tsp. light garvey over   [place] Thin. Top with 2-3 slices cucumber and 1 oz slice of low fat cheese   Supplement 1 cup skim milk (if needed between meals)   Dinner 3 oz  grilled, broiled, or baked seasoned skinless chicken breast    cup asparagus     Breakfast   cup plain oatmeal made with skim or 1% milk with 1 Tbsp. flavored/unflavored protein powder added  1 mozzarella string cheese   Lunch 2 oz deli turkey breast  1/3 cup salad with 1 tsp. light salad dressing, 1/8 of a whole avocado and 1 Tbsp. sunflower seeds   Dinner 3 oz. pork loin made in a crock pot, seasoned with a spice rub    cup cooked carrots   Supplement Approved protein supplement (if needed between meals)     Breakfast 1 cup breakfast casserole made with egg substitute, turkey sausage,  and steamed, chopped bell peppers   Supplement  1 cup light Greek yogurt (if needed between meals)   Lunch 2 oz. teriyaki turkey    cup mashed sweet potato with 1-2 spritzes of spray butter    cup fresh pineapple   Dinner 3 oz low fat meatloaf    cup roasted garlic zucchini     Breakfast   cup leftover breakfast casserole    cup no sugar added applesauce with 1 Tbsp. unflavored protein powder and a sprinkle of cinnamon    Lunch 3 oz shrimp with 1-2 Tbsp. low-sugar cocktail sauce for dipping    c. whole wheat pasta drizzled with   tsp. olive oil   Supplement 1 cup skim/1% milk with scoop of protein powder (if needed between meals)   Dinner Grilled, seasoned  kebob with 2 oz lean beef and   cup vegetables     Breakfast Breakfast pizza:   [employer] Thin spread with 1 Tbsp. low sugar spaghetti sauce,   cup shredded low fat cheese, melted and 1 slice of [place] correia     cup fresh fruit mixed with chopped almonds   Lunch   cup black bean soup  4-5 whole grain crackers   Dinner 3 oz  tilapia with lemon pepper seasoning    cup stewed tomatoes   Supplement 1 string cheese (if needed between meals)     Breakfast 2 hard boiled eggs (discard 1 egg yolk)    whole wheat English Muffin with 1 tsp. low sugar jelly   Lunch   cup leftover black bean soup topped with 1-2 Tbsp. low fat cheese  2-3 light [place] Krisp crackers   Supplement Approved protein supplement (if needed between meals)   Dinner 3 oz sirloin steak    cup steamed broccoli      LEAN PROTEIN SOURCES  Getting 20-30 grams of protein, 3 meals daily, is appropriate for most people, some need more but more than about 40 grams per meal is not useful.  General rule is drinking one ounce of water per gram of protein eaten over the course of the day:  70 grams of protein each day, drink 70 oz of water.  Protein Source Portion Calories Grams of Protein                           Nonfat, plain Greek yogurt    (10 grams sugar or less) 3/4 cup (6 oz)  12-17   Light Yogurt (10 grams sugar or less) 3/4 cup (6 oz)  6-8   Protein Shake 1 shake 110-180 15-30   Skim/1% Milk or lactose-free milk 1 cup ( 8 oz)  8   Plain or light, flavored soymilk 1 cup  7-8   Plain or light, hemp milk 1 cup 110 6   Fat Free or 1% Cottage Cheese 1/2 cup 90 15   Part skim ricotta cheese 1/2 cup 100 14   Part skim or reduced fat cheese slices 1 ounce 65-80 8     Mozzarella String Cheese 1 80 8   Canned tuna, chicken, crab or salmon  (canned in water)  1/2 cup 100 15-20   White fish (broiled, grilled, baked) 3 ounces 100 21   Auberry/Tuna (broiled, grilled, baked) 3 ounces 150-180 21   Shrimp, Scallops, Lobster, Crab 3 ounces 100 21   Pork  loin, Pork Tenderloin 3 ounces 150 21   Boneless, skinless chicken /turkey breast                          (broiled, grilled, baked) 3 ounces 120 21   [place], [place], [place], and Venison 3 ounces 120 21   Lean cuts of red meat and pork (sirloin,   round, tenderloin, flank, ground 93%-96%) 3 ounces 170 21   Lean or Extra Lean Ground Turkey 1/2 cup 150 20   90-95% Lean [place] Burger 1 екатерина 140-180 21   Low-fat casserole with lean meat 3/4 cup 200 17   Luncheon Meats                                                        (turkey, lean ham, roast beef, chicken) 3 ounces 100 21   Egg (boiled, poached, scrambled) 1 Egg 60 7   Egg Substitute 1/2 cup 70 10   Nuts (limit to 1 serving per day)  3 Tbsp. 150 7   Nut [place] (peanut, almond)  Limit to 1 serving or less daily 1 Tbsp. 90 4   Soy Burger (varies) 1  15   Garbanzo, Black, [place] Beans 1/2 cup 110 7   Refried Beans 1/2 cup 100 7   Kidney and Lima beans 1/2 cup 110 7   Tempeh 3 oz 175 18   Vegan crumbles 1/2 cup 100 14   Tofu 1/2 cup 110 14   Chili (beans and extra lean beef or turkey) 1 cup 200 23   Lentil Stew/Soup 1 cup 150 12   Black Bean Soup 1 cup 175 12               We offer support groups for patients who are working on weight loss and considering, preparing for or have had weight loss surgery.   There is no cost for this opportunity.  You are invited to attend the?Virtual Support Groups?provided by any of the following locations:    [facility] via [employer] Teams with Shawnee Martin RN  2.   [place] via [employer] Teams with Jacobo Hurtado, PhD, LP  3.   [place] via [employer] Teams with Virginie Boswell RN  4.   [facility] via [employer] Teams with Virginie Dior [facility]    The following Support Group information can also be found on our website:  [email or web address]      [facility] Weight Loss Surgery Support Group    [facility] Weight Loss Surgery Support Group  The  support group is a patient-lead forum that meets monthly to share experiences, encouragement and education. It is open to those who have had weight loss surgery, are scheduled for surgery, and those who are considering surgery.   WHEN: This group meets on the 3rd Wednesday of each month from 5:00PM - 6:00PM virtually using Microsoft Teams.   FACILITATOR: Led by Shawnee Martin, the program's Clinical Coordinator.   TO REGISTER: Please contact the clinic via [employer] or call the nurse line directly at [phone] to inform our staff that you would like an invite sent to you and to let us know the email you would like the invite sent to. Prior to the meeting, a link with directions on how to join the meeting will be sent to you.    [facility] Support Groups    Connections: Bariatric Care Support Group?  This is open to all [facility] (and those external to this program) pre- and post- operative bariatric surgery patients as well as their support system.   WHEN: This group meets the 3rd Tuesday of each month from 6:30 PM - 8:00 PM virtually using Microsoft Teams.   FACILITATOR: Led by Jacobo Hurtado, Ph.D who is a Licensed Psychologist with the [facility] Comprehensive Weight Management Program.   TO REGISTER: Please send an email to Jacobo Hurtado, Ph.D., LP at [email or web address]?if you would like an invitation to the group and to learn about using Microsoft Teams.      Connections: Post-Operative Bariatric Surgery Support Group  This is a support group for [facility] bariatric patients (and those external to [facility]) who have had bariatric surgery and are at least 3 months post-surgery.  WHEN: This support group meets the 4th Wednesday of the month from 11:00 AM - 12:00 PM virtually using Microsoft Teams.   FACILITATOR: Led by Certified Bariatric Nurse, Virginie Boswell RN.   TO REGISTER: Please send an email to Virginie at  [email or web address] if you would like an invitation to the group and to learn about using [employer].      [facility] Healthy Lifestyle Virtual Support Group    Healthy Lifestyle Virtual Support Group?  This is 60 minutes of small group guided discussion, support and resources. All are welcome who want a healthy lifestyle.  WHEN: This group meets monthly on a Friday from 12:30 PM - to 1:30 PM virtually using Microsoft Teams.   FACILITATOR: Led by National Board Certified Health , Virginie Dior [facility].   TO REGISTER: Please send an email to Virginie [email or web address] to receive monthly invites to the group or if you have any questions about having a health .  Prior to the meeting, a link with directions on how to join the meeting will be sent to you.

## 2023-07-31 NOTE — PATIENT INSTRUCTIONS
Lifestyle Changes Before and After Weight Loss Surgery: [facility]  [email or web address]       [facility]ariat[facility]  Nutritional Guidelines  Gastric Bypass 18 Months Post Op and Beyond    General Guidelines and Helpful Hints:  Eat 3 meals per day + protein supplement(s). No snacks between meals.  Do not skip meals.  This can cause overeating at the next meal and will prevent adequate protein and nutritional intake.  Aim for 60-80 grams of protein per day.  Always eat your protein first. This assists with optimal nutrition and helps you stay full longer.  Depending on your portion size, you may need to drink approved protein supplement between meals to achieve protein goals. Follow recommendations of your Dietitian.   Eat your protein first, and then follow with fiber.   It is not necessary to count your fiber, but 15-20 grams per day is recommended.    Add fiber by including fruits, vegetables, whole grains, and beans.   Portions should remain about 1 cup per meal. Use measuring cups to be accurate.  Continue to use saucer/salad plates, infant/toddler silverware to keep portion sizes small and take small bites.  Eat S-L-O-W-L-Y to make each meal last 20-30 minutes. Always stop eating when satisfied.  Continue to use caution with foods containing skins, peels or membranes. Chew well!  Aim for 64 oz. of calorie-free fluids daily.  Continue to avoid caffeine and carbonation. If you choose to drink alcohol, do so in moderation.   Remember to avoid drinking during meals, 15-30 minutes before and 30 minutes after.  Exercise is thorne for continued weight loss and weight maintenance. Aim for 30-60 minutes of physical activity most days of the week. Include cardiovascular and strength training.  If having trouble tolerating meat, try using a crock-pot, tinfoil tent, steamer or other moist cooking method to create tender meats. Add broth or low-fat gravy to help meat stay moist.   Avoid high  sugar and high fat foods to prevent dumping syndrome.  Check nutrition labels for less than 10 grams of sugar and less than 10 grams of fat per serving.  Continue Taking Vitamins/Minerals:  3193-7093 mcg of Sublingual B-12 daily  1 Multivitamin with Iron twice daily (chewable or swallow tabs)  500-600 mg Calcium Citrate twice daily (chewable or swallow tabs)  5000 IU Vitamin D3 daily    Grocery List  Protein:  Fat free Greek or light yogurt (less than 10 grams sugar)  Fat free or low-fat cottage cheese  String cheese or reduced fat cheese slices  Tuna, salmon, crab, egg, or chicken salad made with light or fat free mayonnaise  Egg or Egg Substitute  Lean/extra lean turkey, beef, bison, venison (ground, sirloin, round, flank)  Pork loin or tenderloin (grilled, baked, broiled)  Fish such as salmon, tuna, trout, tilapia, etc. (grilled, baked, broiled)  Tender cuts of lean (skinless) turkey or chicken  Lean deli meats: turkey, lean ham, chicken, lean roast beef  Beans such as kidney, garbanzo, black, camacho, or low-fat/fat free refried beans  Peanut butter (natural preferred). Limit to 1 Tbsp. per day.  Low-fat meatloaf (made with lean ground beef or turkey)  Sloppy Joes made with low-sugar ketchup and lean ground beef or turkey  Soy or vegetable protein (i.e. vegan crumbles, soy/veggie burger, tofu)  Hummus    Vegetables:  Fresh: cooked or raw (as tolerated)  Frozen vegetables  Canned vegetables (low sodium or no salt added, rinse before cooking/eating)  (Ok to have skins/peels/membranes/seeds - just chew well)    Fruits:  Fresh fruit  Frozen fruit (no sugar added)  Canned fruit (packed in its own juice, NOT syrup)  (Ok to have skins/peels/membranes/seeds - just chew well)    Starch:  Unsweetened whole-grain hot cereal (or high fiber cold cereal, dry)  Toasted whole wheat bread or [place] Thins  Whole grain crackers  Baked /boiled/mashed potato/sweet potato  Cooked whole grain pasta, brown rice, or other cooked whole  grains  Starchy vegetables: corn, peas, winter squash        Protein Supplement:   Ready to drink protein shake with:  15-30 grams protein per serving  Less than 10 grams total carbohydrate per serving   Protein powder mixed with:   Skim or 1% milk  Low fat or fat free Lactaid milk, plain or no sugar added soymilk  Water     Fats: (use in moderation)  1 teaspoon of soft tub margarine  1 teaspoon olive oil, canola oil, or peanut oil  1 tablespoon of low-fat garvey or salad dressing     Sample Menu for 18+ months after Gastric Bypass    You do NOT need to eat/drink the full portion sizes listed below  Always stop when you are satisfied    Breakfast   cup 1% cottage cheese     cup mixed berries   Lunch 2 oz lean roast beef on   Ideal Thin with 1 tsp. light garvey    small tomato, chopped, mixed with 1 tsp. light vinaigrette dressing   Supplement Approved protein supplement (if needed between meals)   Dinner 2 oz grilled salmon    cup salad greens with 1 tsp. light salad dressing and 1 tsp. ground flax seed    cup quinoa or brown rice     Breakfast   cup egg substitute with   cup sautéed chopped vegetables  2 light [place] Krisp crackers   Lunch Tuna Melt:   cup tuna mixed with 1 tsp. light garvey over   Ideal Thin. Top with 2-3 slices cucumber and 1 oz slice of low fat cheese   Supplement 1 cup skim milk (if needed between meals)   Dinner 3 oz  grilled, broiled, or baked seasoned skinless chicken breast    cup asparagus     Breakfast   cup plain oatmeal made with skim or 1% milk with 1 Tbsp. flavored/unflavored protein powder added  1 mozzarella string cheese   Lunch 2 oz deli turkey breast  1/3 cup salad with 1 tsp. light salad dressing, 1/8 of a whole avocado and 1 Tbsp. sunflower seeds   Dinner 3 oz. pork loin made in a crock pot, seasoned with a spice rub    cup cooked carrots   Supplement Approved protein supplement (if needed between meals)     Breakfast 1 cup breakfast casserole made with egg substitute, turkey  sausage,  and steamed, chopped bell peppers   Supplement  1 cup light Greek yogurt (if needed between meals)   Lunch 2 oz. teriyaki turkey    cup mashed sweet potato with 1-2 spritzes of spray butter (like [place])    cup fresh pineapple   Dinner 3 oz low fat meatloaf    cup roasted garlic zucchini     Breakfast   cup leftover breakfast casserole    cup no sugar added applesauce with 1 Tbsp. unflavored protein powder and a sprinkle of cinnamon    Lunch 3 oz shrimp with 1-2 Tbsp. low-sugar cocktail sauce for dipping    c. whole wheat pasta drizzled with   tsp. olive oil   Supplement 1 cup skim/1% milk with scoop of protein powder (if needed between meals)   Dinner Grilled, seasoned kebob with 2 oz lean beef and   cup vegetables     Breakfast Breakfast pizza:   [place] Thin spread with 1 Tbsp. low sugar spaghetti sauce,   cup shredded low fat cheese, melted and 1 slice of [place] correia     cup fresh fruit mixed with chopped almonds   Lunch   cup black bean soup  4-5 whole grain crackers   Dinner 3 oz  tilapia with lemon pepper seasoning    cup stewed tomatoes   Supplement 1 string cheese (if needed between meals)     Breakfast 2 hard boiled eggs (discard 1 egg yolk)    whole wheat English Muffin with 1 tsp. low sugar jelly   Lunch   cup leftover black bean soup topped with 1-2 Tbsp. low fat cheese  2-3 light [place] Krisp crackers   Supplement Approved protein supplement (if needed between meals)   Dinner 3 oz sirloin steak    cup steamed broccoli

## 2023-07-31 NOTE — PROGRESS NOTES
"Bariatric Follow Up Visit with a History of Previous Bariatric Surgery     Date of visit: 7/31/2023  Physician: Malvin Vogel MD, MD  Primary Care Provider:  Dustin Ramirez  Katarina Mcclain   43 year old  female    Date of Surgery: 2/27/04  Initial Weight: 315 lbs  Initial BMI: n/a  Today's Weight:   Wt Readings from Last 1 Encounters:   07/31/23 98.7 kg (217 lb 8 oz)     Weight history:   Wt Readings from Last 4 Encounters:   07/31/23 98.7 kg (217 lb 8 oz)   12/07/22 104.3 kg (230 lb)   12/15/21 98.3 kg (216 lb 11.2 oz)   10/13/21 96.6 kg (213 lb)      Body mass index is 34.07 kg/m .      Assessment and Plan     Assessment: Katarina is a 43 year old year old female who is about 19 years s/p  Rubén en Y Gastric Bypass with  Dr. Jeromy Son at [facility].  She's been lost to follow up for years for her bariatric care and comes in today to get back on track. 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 Hurtado and attend support group.  Her vitamin use is sporadic and likely contributes to her dental losses and we'll check levels and optimize as needed. A bariatric manual was given today to help her and we'll get her in touch with dietician.  Her nicotine use is problematic and driving her GERD sx and increases ulcer risks. We'll increase PPI for 3 weeks to BID, give sucralfate and reassess if EGD needed this Fall.  .    Naltrexone was offered to reduce cravings but she declined today. Not interested in appetite suppressant and in the past was intolerant to phentermine \"felt like a crack addict\" w/ bruxism/excess energy.      Katarina Mcclain feels as if she    achieved the goals she hoped to accomplish through bariatric surgery and weight loss.    Encounter Diagnoses   Name Primary?    History of Rubén-en-Y gastric bypass     Obesity, unspecified classification, unspecified obesity type, unspecified whether serious comorbidity " "present     Encounter for vitamin deficiency screening Yes    Other fatigue     Postoperative malabsorption     Nicotine abuse          Current Outpatient Medications:     cetirizine (ZYRTEC) 5 MG tablet, Take 5 mg by mouth daily, Disp: , Rfl:     citalopram (CELEXA) 20 MG tablet, Take 1 tablet (20 mg) by mouth daily, Disp: 90 tablet, Rfl: 1    multivitamin w/minerals (MULTI-VITAMIN) tablet, Take 1 tablet by mouth daily, Disp: , Rfl:     omeprazole (PRILOSEC OTC) 20 MG EC tablet, Take 20 mg by mouth 2 times daily, Disp: , Rfl:     sucralfate (CARAFATE) 1 GM tablet, Take 1 tablet (1 g) by mouth 4 times daily for 10 days, Disp: 40 tablet, Rfl: 0    Plan:  Welcome back to the Bariatric World, read through your manual to get back on track.  Check your Vitamin: it needs to have 18mg/serving of iron and at least 12mg/serving of zinc. You need  2 servings daily of a complete multivitamin with iron. B12 should be 1000mcg daily for now but often just 2-3 days weekly should provide good support. Vitmain D3 1424-6332 international unit(s)/day should work well if using calcium citrate 500mg daily for now.   Follow up with Dr. Hurtado. The invite to support group is below.  Check labs today and I'll message results next week.   Follow up with dietician.   Reduce beer.  Set a quit date for nicotine. There is high gastritis and ulcer risks with nicotine use.  If struggling, let me know and we can start an anticraving medication like naltrexone.  Recheck with me in about 2-3 months to set course/follow up how progressing.   No follow-ups on file.    Bariatric Surgery Review     Interim History/LifeChanges: looking to get back on track with using her bariatric surgery as she really \"hasn't done anything with it\" in many years. Looking for help with     In the past did not due well with stimulant appetite suppressants.     Patient Concerns: getting re-established.  Appetite (1-10): no  GERD: yes, since restarting nicotine and higher " beer habit..    Reviewed whether any need/indication for screening EGD today and we will considered based on response to quitting nicotine..  Typically, a screening EGD is recommend post op year 2-3 if no symptoms to assess health of esophagus/bariatric surgery and sooner if difficult to control GERD or persistent pain/dysphagia sx despite behavior modification.    Medication changes: on PPI.     Vitamin Intake: sporadic use  B-12   Sporadic. Reviewed goals.    MVI  Multivitamin: estrada unsure iron.    Vitamin D  None.    Calcium   None.      Other  None.   Light menses on OCPs now.            LABS: ordered    Nausea no issues  Vomiting no  Constipation no  Diarrhea no  Rashes some sores in the creases of pubic area  Hair Loss none  Reactive Hypoglycemia yes.  Light Headedness none.   Moods controlled well. Good relationship right now.      Most recent labs:  Lab Results   Component Value Date    WBC 6.1 12/15/2021    HGB 12.3 12/15/2021    HCT 39.6 12/15/2021    MCV 79 12/15/2021     12/15/2021     Lab Results   Component Value Date    CHOL 178 12/07/2022     Lab Results   Component Value Date    HDL 93 12/07/2022     No components found for: LDLCALC  Lab Results   Component Value Date    TRIG 82 12/07/2022     No results found for: CHOLHDL  Lab Results   Component Value Date    ALT 38 09/30/2019    AST 26 09/30/2019    ALKPHOS 59 09/30/2019     No results found for: HGBA1C  Lab Results   Component Value Date    B12 248 12/15/2021     No components found for: VITDT1  Lab Results   Component Value Date    HIRAM 6 (L) 12/15/2021     No results found for: PTHI  No results found for: ZN  No results found for: VIB1WB  Lab Results   Component Value Date    TSH 1.12 12/15/2021     No results found for: TEST    Habits:  Tobacco/Nicotine/THC exposure? yes   NSAID use? None.   Alcohol use? 5 days weekly. Beer.    Caffeine Habits? Black coffee.       Exercise Routine: none. Has gym membership  3 meals/day? yes  Protein  "60-80g/day? unsure  Water Separate from meals? yes  Calorie Containing Beverages: beer  Restaurant eating/wk: twice  Sleep Habits:  8  CPAP Use? never  Contraception: implant  DEXA:will start screening today. .  Discussed annual screening to start at age 45 and continue to age 55 if scoring \"low risk\". DEXA scan recommended at age 55 regardless as long as at least 2 years have transpired from their bariatric surgery.    Social History     Social History     Socioeconomic History    Marital status: Single     Spouse name: Not on file    Number of children: 0    Years of education: Not on file    Highest education level: Not on file   Occupational History    Not on file   Tobacco Use    Smoking status: Former     Types: Cigarettes     Start date: 9/3/2014     Passive exposure: Past    Smokeless tobacco: Never    Tobacco comments:     no passive exposure   Vaping Use    Vaping Use: Never used   Substance and Sexual Activity    Alcohol use: Yes     Comment: Alcoholic Drinks/day: weekends    Drug use: No    Sexual activity: Not on file   Other Topics Concern     Service Not Asked    Blood Transfusions Not Asked    Caffeine Concern No    Occupational Exposure Not Asked    Hobby Hazards Not Asked    Sleep Concern Not Asked    Stress Concern Not Asked    Weight Concern Not Asked    Special Diet Not Asked    Back Care Not Asked    Exercise No    Bike Helmet Not Asked    Seat Belt Yes    Self-Exams No   Social History Narrative    Not on file     Social Determinants of Health     Financial Resource Strain: Not on file   Food Insecurity: Not on file   Transportation Needs: Not on file   Physical Activity: Not on file   Stress: Not on file   Social Connections: Not on file   Intimate Partner Violence: Not on file   Housing Stability: Not on file       Past Medical History     Past Medical History:   Diagnosis Date    Allergic rhinitis     Created by Conversion Replacement Utility updated for latest IMO load     Allergic " "rhinitis, cause unspecified     seasonal allergies    Anemia     Created by Conversion     Anxiety     Asthma     Carbuncle and furuncle of trunk 4/2001    R breast    Cervical high risk HPV (human papillomavirus) test positive 12/14/2022 08/23/17 NIL Pap, Neg HR HPV  12/7/22 NIL Pap, + HR HPV (not 16 or 18) Plan cotest in 1 year due 12/7/23 12/14/22 Pt was advised.     Generalized anxiety disorder     Created by Conversion     Hidradenitis 4/95    Hypertrophy of bone 10/9/2019    Added automatically from request for surgery [id number]     Mild major depression (H) 10/25/2018    Obesity, unspecified     Oral Allergy Syndrome     Created by Conversion     Pars defect of lumbar spine 5/17/2016    Personal history of tobacco use, presenting hazards to health     Scheuermann's kyphosis 5/17/2016    Vitamin B12 Deficiency     Created by Conversion     Vitamin D deficiency     Created by Conversion Replacement Utility updated for latest IMO load      Past Surgical History:   Procedure Laterality Date    REVISION RUBÉN-EN-Y      ZZC GASTRIC BYPASS,OBESE<100CM RUBÉN-EN-Y  2/27/04    [facility]       Problem List     Patient Active Problem List   Diagnosis    Vitamin D Deficiency    Anemia    Generalized Anxiety Disorder    Oral Allergy Syndrome    Allergic Rhinitis    Vitamin B12 Deficiency    Scheuermann's kyphosis    Pars defect of lumbar spine    Mild major depression (H)    Hypertrophy of bone    Cervical high risk HPV (human papillomavirus) test positive     Medications     [unfilled]  Surgical History     Past Surgical History  She has a past surgical history that includes GASTRIC BYPASS,OBESE<100CM RUBÉN-EN-Y (2/27/04) and Revision Rubén-En-Y.    Objective-Exam     Constitutional:  /82 (BP Location: Right arm, Patient Position: Sitting)   Ht 1.702 m (5' 7\")   Wt 98.7 kg (217 lb 8 oz)   BMI 34.07 kg/m    [unfilled]   General:  Pleasant and in no acute distress   Eyes:  EOMI  ENT:  Airway patent, " missing left lower molars (2)    Neck:  Respiratory: Normal respiratory effort, no cough, .  CV:  RRR  Gastrointestinal: nontender, midline incision well healed without hernia palpable  Musculoskeletal: muscle mass WNL  Skin: color without pallor hair thick,   Psychiatric: alert and oriented X3, mood and affect normal    Counseling     We reviewed the important post op bariatric recommendations:  -eating 3 meals daily  -eating protein first, getting >60gm protein daily  -eating slowly, chewing food well  -avoiding/limiting calorie containing beverages  -drinking water 15-30 minutes before or after meals  -limiting restaurant or cafeteria eating to twice a week or less    We discussed the importance of restorative sleep and stress management in maintaining a healthy weight.  We discussed the National Weight Control Registry healthy weight maintenance strategies and ways to optimize metabolism.  We discussed the importance of physical activity including cardiovascular and strength training in maintaining a healthier weight.    We discussed the importance of life-long vitamin supplementation and life-long  follow-up.    Katarina was reminded that, to avoid marginal ulcers she should avoid tobacco at all, alcohol in excess, caffeine in excess, and NSAIDS (unless indicated for cardioprotection or othewise and opposed by a PPI).    Malvin Vogel MD    [facility].  7/31/2023  9:09 AM  [phone] (clinic phone)  [phone] (fax)          Medical Decision Making:

## 2023-07-31 NOTE — LETTER
"    7/31/2023         RE: Katarina Mcclain  [address]  [facility] [address]        Dear Colleague,    Thank you for referring your patient, Katarina Mcclain, to the [facility] AND [facility]ATRICS [facility] [place]. Please see a copy of my visit note below.    Bariatric Follow Up Visit with a History of Previous Bariatric Surgery     Date of visit: 7/31/2023  Physician: Malvin Vogel MD, MD  Primary Care Provider:  Dustin Ramirez  Katarina Mcclain   43 year old  female    Date of Surgery: 2/27/04  Initial Weight: 315 lbs  Initial BMI: n/a  Today's Weight:   Wt Readings from Last 1 Encounters:   07/31/23 98.7 kg (217 lb 8 oz)     Weight history:   Wt Readings from Last 4 Encounters:   07/31/23 98.7 kg (217 lb 8 oz)   12/07/22 104.3 kg (230 lb)   12/15/21 98.3 kg (216 lb 11.2 oz)   10/13/21 96.6 kg (213 lb)      Body mass index is 34.07 kg/m .      Assessment and Plan     Assessment: Katarina is a 43 year old year old female who is about 19 years s/p  Rubén en Y Gastric Bypass with  Dr. Jeromy Son at [facility].  She's been lost to follow up for years for her bariatric care and comes in today to get back on track. 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 Hurtado and attend support group.  Her vitamin use is sporadic and likely contributes to her dental losses and we'll check levels and optimize as needed. A bariatric manual was given today to help her and we'll get her in touch with dietician.  Her nicotine use is problematic and driving her GERD sx and increases ulcer risks. We'll increase PPI for 3 weeks to BID, give sucralfate and reassess if EGD needed this Fall.  .    Naltrexone was offered to reduce cravings but she declined today. Not interested in appetite suppressant and in the past was intolerant to phentermine \"felt like a crack addict\" w/ bruxism/excess energy.      Katarina Mcclain " feels as if she    achieved the goals she hoped to accomplish through bariatric surgery and weight loss.    Encounter Diagnoses   Name Primary?     History of Rubén-en-Y gastric bypass      Obesity, unspecified classification, unspecified obesity type, unspecified whether serious comorbidity present      Encounter for vitamin deficiency screening Yes     Other fatigue      Postoperative malabsorption      Nicotine abuse          Current Outpatient Medications:      cetirizine (ZYRTEC) 5 MG tablet, Take 5 mg by mouth daily, Disp: , Rfl:      citalopram (CELEXA) 20 MG tablet, Take 1 tablet (20 mg) by mouth daily, Disp: 90 tablet, Rfl: 1     multivitamin w/minerals (MULTI-VITAMIN) tablet, Take 1 tablet by mouth daily, Disp: , Rfl:      omeprazole (PRILOSEC OTC) 20 MG EC tablet, Take 20 mg by mouth 2 times daily, Disp: , Rfl:      sucralfate (CARAFATE) 1 GM tablet, Take 1 tablet (1 g) by mouth 4 times daily for 10 days, Disp: 40 tablet, Rfl: 0    Plan:  Welcome back to the Bariatric World, read through your manual to get back on track.  Check your Vitamin: it needs to have 18mg/serving of iron and at least 12mg/serving of zinc. You need  2 servings daily of a complete multivitamin with iron. B12 should be 1000mcg daily for now but often just 2-3 days weekly should provide good support. Vitmain D3 [id number] international unit(s)/day should work well if using calcium citrate 500mg daily for now.   Follow up with Dr. Hurtado. The invite to support group is below.  Check labs today and I'll message results next week.   Follow up with dietician.   Reduce beer.  Set a quit date for nicotine. There is high gastritis and ulcer risks with nicotine use.  If struggling, let me know and we can start an anticraving medication like naltrexone.  Recheck with me in about 2-3 months to set course/follow up how progressing.   No follow-ups on file.    Bariatric Surgery Review     Interim History/LifeChanges: looking to get back on track  "with using her bariatric surgery as she really \"hasn't done anything with it\" in many years. Looking for help with     In the past did not due well with stimulant appetite suppressants.     Patient Concerns: getting re-established.  Appetite (1-10): no  GERD: yes, since restarting nicotine and higher beer habit..    Reviewed whether any need/indication for screening EGD today and we will considered based on response to quitting nicotine..  Typically, a screening EGD is recommend post op year 2-3 if no symptoms to assess health of esophagus/bariatric surgery and sooner if difficult to control GERD or persistent pain/dysphagia sx despite behavior modification.    Medication changes: on PPI.     Vitamin Intake: sporadic use  B-12   Sporadic. Reviewed goals.    MVI  Multivitamin: natalie unsure iron.    Vitamin D  None.    Calcium   None.      Other  None.   Light menses on OCPs now.            LABS: ordered    Nausea no issues  Vomiting no  Constipation no  Diarrhea no  Rashes some sores in the creases of pubic area  Hair Loss none  Reactive Hypoglycemia yes.  Light Headedness none.   Moods controlled well. Good relationship right now.      Most recent labs:  Lab Results   Component Value Date    WBC 6.1 12/15/2021    HGB 12.3 12/15/2021    HCT 39.6 12/15/2021    MCV 79 12/15/2021     12/15/2021     Lab Results   Component Value Date    CHOL 178 12/07/2022     Lab Results   Component Value Date    HDL 93 12/07/2022     No components found for: LDLCALC  Lab Results   Component Value Date    TRIG 82 12/07/2022     No results found for: CHOLHDL  Lab Results   Component Value Date    ALT 38 09/30/2019    AST 26 09/30/2019    ALKPHOS 59 09/30/2019     No results found for: HGBA1C  Lab Results   Component Value Date    B12 248 12/15/2021     No components found for: VITDT1  Lab Results   Component Value Date    HIRAM 6 (L) 12/15/2021     No results found for: PTHI  No results found for: ZN  No results found for: " "VIB1WB  Lab Results   Component Value Date    TSH 1.12 12/15/2021     No results found for: TEST    Habits:  Tobacco/Nicotine/THC exposure? yes   NSAID use? None.   Alcohol use? 5 days weekly. Beer.    Caffeine Habits? Black coffee.       Exercise Routine: none. Has gym membership  3 meals/day? yes  Protein 60-80g/day? unsure  Water Separate from meals? yes  Calorie Containing Beverages: beer  Restaurant eating/wk: twice  Sleep Habits:  8  CPAP Use? never  Contraception: implant  DEXA:will start screening today. .  Discussed annual screening to start at age 45 and continue to age 55 if scoring \"low risk\". DEXA scan recommended at age 55 regardless as long as at least 2 years have transpired from their bariatric surgery.    Social History     Social History     Socioeconomic History     Marital status: Single     Spouse name: Not on file     Number of children: 0     Years of education: Not on file     Highest education level: Not on file   Occupational History     Not on file   Tobacco Use     Smoking status: Former     Types: Cigarettes     Start date: 9/3/2014     Passive exposure: Past     Smokeless tobacco: Never     Tobacco comments:     no passive exposure   Vaping Use     Vaping Use: Never used   Substance and Sexual Activity     Alcohol use: Yes     Comment: Alcoholic Drinks/day: weekends     Drug use: No     Sexual activity: Not on file   Other Topics Concern      Service Not Asked     Blood Transfusions Not Asked     Caffeine Concern No     Occupational Exposure Not Asked     Hobby Hazards Not Asked     Sleep Concern Not Asked     Stress Concern Not Asked     Weight Concern Not Asked     Special Diet Not Asked     Back Care Not Asked     Exercise No     Bike Helmet Not Asked     Seat Belt Yes     Self-Exams No   Social History Narrative     Not on file     Social Determinants of Health     Financial Resource Strain: Not on file   Food Insecurity: Not on file   Transportation Needs: Not on file "   Physical Activity: Not on file   Stress: Not on file   Social Connections: Not on file   Intimate Partner Violence: Not on file   Housing Stability: Not on file       Past Medical History     Past Medical History:   Diagnosis Date     Allergic rhinitis     Created by Conversion Replacement Utility updated for latest IMO load      Allergic rhinitis, cause unspecified     seasonal allergies     Anemia     Created by Conversion      Anxiety      Asthma      Carbuncle and furuncle of trunk 4/2001    R breast     Cervical high risk HPV (human papillomavirus) test positive 12/14/2022 08/23/17 NIL Pap, Neg HR HPV  12/7/22 NIL Pap, + HR HPV (not 16 or 18) Plan cotest in 1 year due 12/7/23 12/14/22 Pt was advised.      Generalized anxiety disorder     Created by Conversion      Hidradenitis 4/95     Hypertrophy of bone 10/9/2019    Added automatically from request for surgery [id number]      Mild major depression (H) 10/25/2018     Obesity, unspecified      Oral Allergy Syndrome     Created by Conversion      Pars defect of lumbar spine 5/17/2016     Personal history of tobacco use, presenting hazards to health      Scheuermann's kyphosis 5/17/2016     Vitamin B12 Deficiency     Created by Conversion      Vitamin D deficiency     Created by Conversion Replacement Utility updated for latest IMO load      Past Surgical History:   Procedure Laterality Date     REVISION AMADOU-EN-Y       ZZC GASTRIC BYPASS,OBESE<100CM AMADOU-EN-Y  2/27/04    [facility]       Problem List     Patient Active Problem List   Diagnosis     Vitamin D Deficiency     Anemia     Generalized Anxiety Disorder     Oral Allergy Syndrome     Allergic Rhinitis     Vitamin B12 Deficiency     Scheuermann's kyphosis     Pars defect of lumbar spine     Mild major depression (H)     Hypertrophy of bone     Cervical high risk HPV (human papillomavirus) test positive     Medications     [unfilled]  Surgical History     Past Surgical History  She has  "a past surgical history that includes GASTRIC BYPASS,OBESE<100CM RUBÉN-EN-Y (2/27/04) and Revision Rubén-En-Y.    Objective-Exam     Constitutional:  /82 (BP Location: Right arm, Patient Position: Sitting)   Ht 1.702 m (5' 7\")   Wt 98.7 kg (217 lb 8 oz)   BMI 34.07 kg/m    [unfilled]   General:  Pleasant and in no acute distress   Eyes:  EOMI  ENT:  Airway patent, missing left lower molars (2)    Neck:  Respiratory: Normal respiratory effort, no cough, .  CV:  RRR  Gastrointestinal: nontender, midline incision well healed without hernia palpable  Musculoskeletal: muscle mass WNL  Skin: color without pallor hair thick,   Psychiatric: alert and oriented X3, mood and affect normal    Counseling     We reviewed the important post op bariatric recommendations:  -eating 3 meals daily  -eating protein first, getting >60gm protein daily  -eating slowly, chewing food well  -avoiding/limiting calorie containing beverages  -drinking water 15-30 minutes before or after meals  -limiting restaurant or cafeteria eating to twice a week or less    We discussed the importance of restorative sleep and stress management in maintaining a healthy weight.  We discussed the National Weight Control Registry healthy weight maintenance strategies and ways to optimize metabolism.  We discussed the importance of physical activity including cardiovascular and strength training in maintaining a healthier weight.    We discussed the importance of life-long vitamin supplementation and life-long  follow-up.    Katarina was reminded that, to avoid marginal ulcers she should avoid tobacco at all, alcohol in excess, caffeine in excess, and NSAIDS (unless indicated for cardioprotection or othewise and opposed by a PPI).    Malvin Vogel MD    [facility].  7/31/2023  9:09 AM  [phone] (clinic phone)  [phone] (fax)          Medical Decision Making:              Again, thank you for allowing me to participate in the care " of your patient.        Sincerely,        Malvin Vogel MD

## 2023-08-01 LAB — ZINC SERPL-MCNC: 77 UG/DL

## 2023-08-02 LAB
ANNOTATION COMMENT IMP: NORMAL
RETINYL PALMITATE SERPL-MCNC: 0.02 MG/L
VIT A SERPL-MCNC: 0.52 MG/L
VIT B1 PYROPHOSHATE BLD-SCNC: 119 NMOL/L

## 2023-08-02 RX ORDER — BACILLUS COAGULANS 1B CELL
1 CAPSULE ORAL DAILY
Qty: 45 TABLET | Refills: 0 | Status: SHIPPED | OUTPATIENT
Start: 2023-08-02 | End: 2023-09-16

## 2023-08-07 DIAGNOSIS — Z72.0 NICOTINE ABUSE: ICD-10-CM

## 2023-08-07 RX ORDER — SUCRALFATE 1 G/1
1 TABLET ORAL 4 TIMES DAILY
Qty: 40 TABLET | Refills: 0 | Status: SHIPPED | OUTPATIENT
Start: 2023-08-07 | End: 2023-11-16

## 2023-08-16 ENCOUNTER — TELEPHONE (OUTPATIENT)
Dept: SURGERY | Facility: CLINIC | Age: 43
End: 2023-08-16
Payer: COMMERCIAL

## 2023-08-16 NOTE — TELEPHONE ENCOUNTER
Got a refill request on the Carafate for the patient so I called to see how she is doing with it and if she is still using this.  She said she doesn't remember ever needing it and has no symptoms of an ulcer at this time.  Will not refill it and she has a follow-up planned with Dr. Vogel in November to check in further.  Patient verbalized understanding.  Virginie Boswell RN

## 2023-10-11 ENCOUNTER — OFFICE VISIT (OUTPATIENT)
Dept: FAMILY MEDICINE | Facility: CLINIC | Age: 43
End: 2023-10-11
Payer: COMMERCIAL

## 2023-10-11 VITALS
RESPIRATION RATE: 18 BRPM | TEMPERATURE: 97.6 F | DIASTOLIC BLOOD PRESSURE: 70 MMHG | BODY MASS INDEX: 34.14 KG/M2 | SYSTOLIC BLOOD PRESSURE: 108 MMHG | OXYGEN SATURATION: 100 % | WEIGHT: 218 LBS | HEART RATE: 82 BPM

## 2023-10-11 DIAGNOSIS — J01.90 ACUTE SINUSITIS WITH SYMPTOMS > 10 DAYS: Primary | ICD-10-CM

## 2023-10-11 PROCEDURE — 99213 OFFICE O/P EST LOW 20 MIN: CPT | Performed by: NURSE PRACTITIONER

## 2023-10-11 ASSESSMENT — PATIENT HEALTH QUESTIONNAIRE - PHQ9
SUM OF ALL RESPONSES TO PHQ QUESTIONS 1-9: 4
SUM OF ALL RESPONSES TO PHQ QUESTIONS 1-9: 4
10. IF YOU CHECKED OFF ANY PROBLEMS, HOW DIFFICULT HAVE THESE PROBLEMS MADE IT FOR YOU TO DO YOUR WORK, TAKE CARE OF THINGS AT HOME, OR GET ALONG WITH OTHER PEOPLE: NOT DIFFICULT AT ALL

## 2023-10-11 NOTE — PROGRESS NOTES
"  Assessment & Plan     Acute sinusitis with symptoms > 10 days  Augmentin BID x 10 days.  May continue nasal rinses.  Consider nasal steroid such as Flonase or Nasocort.  Follow up with any new or worsening symptoms.   - amoxicillin-clavulanate (AUGMENTIN) 875-125 MG tablet  Dispense: 20 tablet; Refill: 0         BMI:   Estimated body mass index is 34.14 kg/m  as calculated from the following:    Height as of 7/31/23: 1.702 m (5' 7\").    Weight as of this encounter: 98.9 kg (218 lb).       Katya Hyde NP  [facility]    Scar Bray is a 43 year old who presents with complaints of significant sinus congestion for 3 to 4 weeks.  Symptoms initially started as a cold with a cough, sore throat, and fatigue.  Her COVID testing was negative.  Her cold symptoms have primarily resolved, but she is having significant left maxillary sinus pain and congestion.  Associated symptoms include postnasal drip.  She has been doing nasal rinses without significant improvement of her symptoms.  She was benefiting from over-the-counter decongestants at 1 point, but those are no longer working.    Cold Symptoms (3.5 weeks ; can't seem to clean sinus )        History of Present Illness       Reason for visit:  3.5 weeks of sinus issues  Symptom onset:  3-4 weeks ago  Symptoms include:  I cant clear my sinuses  Symptom intensity:  Moderate  Symptom progression:  Staying the same  Had these symptoms before:  No    She eats 0-1 servings of fruits and vegetables daily.She consumes 0 sweetened beverage(s) daily.She exercises with enough effort to increase her heart rate 9 or less minutes per day.  She exercises with enough effort to increase her heart rate 3 or less days per week. She is missing 2 dose(s) of medications per week.         Review of Systems   Pertinent items in HPI        Objective    /70 (BP Location: Right arm, Patient Position: Sitting, Cuff Size: Adult Large)   Pulse 82   " Temp 97.6  F (36.4  C) (Temporal)   Resp 18   Wt 98.9 kg (218 lb)   SpO2 100%   BMI 34.14 kg/m    Body mass index is 34.14 kg/m .  Physical Exam   GENERAL: healthy, alert and no distress  EYES: Eyes grossly normal to inspection, PERRL and conjunctivae and sclerae normal  HENT: ear canals and TM's normal, nose and mouth without ulcers or lesions. Left maxillary sinus tenderness.  NECK: no adenopathy, no asymmetry, masses, or scars and thyroid normal to palpation  RESP: lungs clear to auscultation - no rales, rhonchi or wheezes  CV: regular rate and rhythm, normal S1 S2, no S3 or S4, no murmur, click or rub, no peripheral edema

## 2023-11-01 ENCOUNTER — PATIENT OUTREACH (OUTPATIENT)
Dept: CARE COORDINATION | Facility: CLINIC | Age: 43
End: 2023-11-01

## 2023-11-03 ENCOUNTER — OFFICE VISIT (OUTPATIENT)
Dept: FAMILY MEDICINE | Facility: CLINIC | Age: 43
End: 2023-11-03
Payer: COMMERCIAL

## 2023-11-03 VITALS
DIASTOLIC BLOOD PRESSURE: 84 MMHG | OXYGEN SATURATION: 97 % | HEART RATE: 84 BPM | WEIGHT: 218 LBS | BODY MASS INDEX: 34.21 KG/M2 | TEMPERATURE: 98.4 F | SYSTOLIC BLOOD PRESSURE: 122 MMHG | RESPIRATION RATE: 20 BRPM | HEIGHT: 67 IN

## 2023-11-03 DIAGNOSIS — R05.1 ACUTE COUGH: Primary | ICD-10-CM

## 2023-11-03 PROCEDURE — 99213 OFFICE O/P EST LOW 20 MIN: CPT | Performed by: FAMILY MEDICINE

## 2023-11-03 RX ORDER — PREDNISONE 20 MG/1
40 TABLET ORAL DAILY
Qty: 10 TABLET | Refills: 0 | Status: SHIPPED | OUTPATIENT
Start: 2023-11-03 | End: 2023-11-08

## 2023-11-03 RX ORDER — ALBUTEROL SULFATE 90 UG/1
2 AEROSOL, METERED RESPIRATORY (INHALATION) EVERY 6 HOURS PRN
Qty: 18 G | Refills: 0 | Status: SHIPPED | OUTPATIENT
Start: 2023-11-03 | End: 2023-11-27

## 2023-11-03 RX ORDER — FAMOTIDINE 10 MG
10 TABLET ORAL 2 TIMES DAILY
COMMUNITY
End: 2024-01-31

## 2023-11-03 RX ORDER — BENZONATATE 100 MG/1
200 CAPSULE ORAL 3 TIMES DAILY PRN
Qty: 30 CAPSULE | Refills: 0 | Status: SHIPPED | OUTPATIENT
Start: 2023-11-03 | End: 2024-01-31

## 2023-11-03 NOTE — PROGRESS NOTES
Assessment & Plan     1. Acute cough  - predniSONE (DELTASONE) 20 MG tablet; Take 2 tablets (40 mg) by mouth daily for 5 days  Dispense: 10 tablet; Refill: 0  - albuterol (PROAIR HFA/PROVENTIL HFA/VENTOLIN HFA) 108 (90 Base) MCG/ACT inhaler; Inhale 2 puffs into the lungs every 6 hours as needed for shortness of breath, wheezing or cough  Dispense: 18 g; Refill: 0  - benzonatate (TESSALON) 100 MG capsule; Take 2 capsules (200 mg) by mouth 3 times daily as needed for cough  Dispense: 30 capsule; Refill: 0      Onset of symptoms approximately 6 weeks ago with upper respiratory infection, covered with Augmentin for sinusitis and upper respiratory symptoms have improved but since discontinuing Augmentin, symptoms have migrated down into the chest.  No fevers, no chills, no chest pain though she is experiencing wheezing and shortness of breath.  She has a history of tobacco use.  Oxygen saturations normal, she is nontoxic-appearing, no focal crackles on exam.    Will cover with burst of prednisone and albuterol inhaler.  Continue OTC agents for cough management.  Add Tessalon Perles as needed if persistent cough.  If symptoms not improving, would plan to cover with azithromycin.    Avelina De La Rosa, [facility] is a 43 year old, presenting for the following health issues:  Cough (Dry cough, chest congestion, SOB, not sleeping due to cough-worse laying down x 6 weeks )        11/3/2023     9:15 AM   Additional Questions   Roomed by Nolvia DALTON LPN       History of Present Illness       Reason for visit:  Cough    She eats 2-3 servings of fruits and vegetables daily.She consumes 0 sweetened beverage(s) daily.She exercises with enough effort to increase her heart rate 9 or less minutes per day.  She exercises with enough effort to increase her heart rate 3 or less days per week. She is missing 1 dose(s) of medications per week.         Acute Illness  Acute illness concerns:  "Dry cough, chest congestion, SOB  Onset/Duration: x 2 weeks   Symptoms:  Fever: No  Chills/Sweats: No  Headache (location?): No  Sinus Pressure: No  Conjunctivitis:  No  Ear Pain: no  Rhinorrhea: YES  Congestion: YES  Sore Throat: YES  Cough: YES  Wheeze: YES  Decreased Appetite: No  Nausea: YES  Vomiting: YES  Diarrhea: No  Dysuria/Freq.: No  Dysuria or Hematuria: No  Fatigue/Achiness: YES  Sick/Strep Exposure: No  Therapies tried and outcome: Mucinex, and Cold/Flu medicine     Onset of symptoms about 6 weeks ago, treated for sinus infection, symptoms improved, not fully resolved.   Feels like symptoms have traveled into her lungs. Symptoms of cough. Dry, can't catch her breath, persistent cough until she coughs something up. This particular symptom has been present for the last week. When coughing hard, vomiting.     No fevers or chills. Feels achy and fatigued.   Appetite normal.   No nausea. Brief diarrhea, resolved now.  Negative covid at beginning of illness.   Burning sensation in chest.     Has tried Vicks. Mucinex, Severe Cold & Flu, emergen-C supplement.   Feels like she is spitting up sputum.     Quit smoking, restarted 4 months ago but not since illness.   Hx allergy induced asthma, resolved. No prior COPD diagnosis.     Does feel wheezy, chest tightness.     Review of Systems   See HPI above for pertinent ROS.      Objective    /84   Pulse 84   Temp 98.4  F (36.9  C) (Oral)   Resp 20   Ht 1.702 m (5' 7\")   Wt 98.9 kg (218 lb)   SpO2 97%   BMI 34.14 kg/m    Body mass index is 34.14 kg/m .  Physical Exam   GENERAL: healthy, alert and no distress  EYES: Eyes grossly normal to inspection, PERRL and conjunctivae and sclerae normal  HENT: ear canals and TM's normal, nose and mouth without ulcers or lesions  NECK: no adenopathy, no asymmetry, masses, or scars and thyroid normal to palpation  RESP: Respirations unlabored, expiratory squeaks present, diminished air sounds  CV: regular rate and " rhythm, normal S1 S2, no S3 or S4, no murmur, click or rub, no peripheral edema and peripheral pulses strong  ABDOMEN: soft, nontender, no hepatosplenomegaly, no masses and bowel sounds normal  MS: no gross musculoskeletal defects noted, no edema

## 2023-11-16 ENCOUNTER — OFFICE VISIT (OUTPATIENT)
Dept: PEDIATRICS | Facility: CLINIC | Age: 43
End: 2023-11-16
Payer: COMMERCIAL

## 2023-11-16 ENCOUNTER — MYC MEDICAL ADVICE (OUTPATIENT)
Dept: FAMILY MEDICINE | Facility: CLINIC | Age: 43
End: 2023-11-16
Payer: COMMERCIAL

## 2023-11-16 ENCOUNTER — ANCILLARY PROCEDURE (OUTPATIENT)
Dept: GENERAL RADIOLOGY | Facility: CLINIC | Age: 43
End: 2023-11-16
Attending: NURSE PRACTITIONER
Payer: COMMERCIAL

## 2023-11-16 VITALS
DIASTOLIC BLOOD PRESSURE: 85 MMHG | TEMPERATURE: 98.9 F | HEART RATE: 89 BPM | BODY MASS INDEX: 34.21 KG/M2 | SYSTOLIC BLOOD PRESSURE: 128 MMHG | WEIGHT: 218 LBS | OXYGEN SATURATION: 95 % | RESPIRATION RATE: 18 BRPM | HEIGHT: 67 IN

## 2023-11-16 DIAGNOSIS — Z86.16 PERSONAL HISTORY OF COVID-19: ICD-10-CM

## 2023-11-16 DIAGNOSIS — K21.9 GASTROESOPHAGEAL REFLUX DISEASE WITHOUT ESOPHAGITIS: ICD-10-CM

## 2023-11-16 DIAGNOSIS — R05.3 PERSISTENT COUGH FOR 3 WEEKS OR LONGER: Primary | ICD-10-CM

## 2023-11-16 DIAGNOSIS — J45.20 MILD INTERMITTENT EXTRINSIC ASTHMA WITHOUT COMPLICATION: ICD-10-CM

## 2023-11-16 PROCEDURE — 99214 OFFICE O/P EST MOD 30 MIN: CPT | Performed by: NURSE PRACTITIONER

## 2023-11-16 PROCEDURE — 71046 X-RAY EXAM CHEST 2 VIEWS: CPT | Mod: TC | Performed by: STUDENT IN AN ORGANIZED HEALTH CARE EDUCATION/TRAINING PROGRAM

## 2023-11-16 ASSESSMENT — PAIN SCALES - GENERAL: PAINLEVEL: MILD PAIN (3)

## 2023-11-16 NOTE — PATIENT INSTRUCTIONS
It was nice seeing you today.    Please let me know if you have any questions regarding today's visit!    Take care,    PACO Cobb DNP  Family Medicine

## 2023-11-16 NOTE — PROGRESS NOTES
Assessment & Plan     Persistent cough for 3 weeks or longer  Personal history of COVID-19  Cough present for 8 weeks.  At home COVID test negative.  Hx of COVID in the past x3.  Chest xray today per patient request.  Started on omeprazole 20mg BID and told to hold at home antacid.   Started on Flovent Diskus today due to history of asthma.  - XR Chest 2 Views  - omeprazole (PRILOSEC) 20 MG DR capsule; Take 1 capsule (20 mg) by mouth 2 times daily  - fluticasone (FLOVENT DISKUS) 100 MCG/ACT inhaler; Inhale 1 puff into the lungs every 12 hours    Gastroesophageal reflux disease without esophagitis  Started on omeprazole 20mg BID and told to hold at home antacid.   - XR Chest 2 Views  - omeprazole (PRILOSEC) 20 MG DR capsule; Take 1 capsule (20 mg) by mouth 2 times daily  - fluticasone (FLOVENT DISKUS) 100 MCG/ACT inhaler; Inhale 1 puff into the lungs every 12 hours    Mild intermittent extrinsic asthma without complication  Started on Flovent today.  Continue to use albuterol as needed.  - XR Chest 2 Views  - fluticasone (FLOVENT DISKUS) 100 MCG/ACT inhaler; Inhale 1 puff into the lungs every 12 hours    Declines labs and additional preventative care orders today.  Follow-up if symptoms worsen of do not improve      Isadora Cobb NP  [facility] ABDIFATAH Bray is a 43 year old, presenting for the following health issues:  Shortness of Breath        11/16/2023     1:15 PM   Additional Questions   Roomed by Katerine Nam   Accompanied by JANINA         11/16/2023     1:15 PM   Patient Reported Additional Medications   Patient reports taking the following new medications No       History of Present Illness       Reason for visit:  Cough    She eats 2-3 servings of fruits and vegetables daily.She consumes 0 sweetened beverage(s) daily.She exercises with enough effort to increase her heart rate 9 or less minutes per day.  She exercises with enough effort to increase her heart rate 3 or less days  "per week. She is missing 1 dose(s) of medications per week.       Acute Illness  Acute illness concerns: Shortness of breath  Onset/Duration: 8 weeks ago  Symptoms:  Fever: No  Chills/Sweats: YES  Headache (location?): YES  Sinus Pressure: No  Conjunctivitis:  No  Ear Pain: no  Rhinorrhea: No  Congestion: No  Sore Throat: No  Cough: YES-productive of clear sputum, productive of yellow sputum, with shortness of breath, worsening over time  Wheeze: YES  Decreased Appetite: No  Nausea: No  Vomiting: YES  Diarrhea: No  Dysuria/Freq.: No  Dysuria or Hematuria: No  Fatigue/Achiness: YES  Sick/Strep Exposure: No  Therapies tried and outcome: Antibiotics- Did not work, Prednisone- helped, Albuterol- helps, OTC mucinex- helps a little.     No hx of COVID.  Did at home test when symptoms first started.    Hx of COVID in the past x3.    Started 8 weeks ago . Was seen 10/11 and 11/3.  Treated for sinus infection and symptom did improve.  Thinks symptoms traveled to her lung.  Cough and SOB.      Hx of allergy induced asthma.  No COPD.  On and off smoker since 16 years of age.  On average 1/2PPD when smoking.  Inquiring about a chest xray.    GERD:  -Takes OTC antacid.  Unsure what the medication is.  -Feels like symptoms are controlled.    Review of Systems   Constitutional, HEENT, cardiovascular, pulmonary, gi and gu systems are negative, except as otherwise noted.      Objective    /85 (BP Location: Right arm, Patient Position: Sitting, Cuff Size: Adult Large)   Pulse 89   Temp 98.9  F (37.2  C) (Oral)   Resp 18   Ht 1.702 m (5' 7\")   Wt 98.9 kg (218 lb)   SpO2 95%   BMI 34.14 kg/m    Body mass index is 34.14 kg/m .    Physical Exam   GENERAL: healthy, alert and no distress  RESP: lungs clear to auscultation - no rales, rhonchi or wheezes and expiratory wheezes R lower posterior  CV: regular rate and rhythm, normal S1 S2, no S3 or S4, no murmur, click or rub, no peripheral edema and peripheral pulses " strong  PSYCH: mentation appears normal, affect normal/bright

## 2023-11-17 ENCOUNTER — MYC MEDICAL ADVICE (OUTPATIENT)
Dept: PEDIATRICS | Facility: CLINIC | Age: 43
End: 2023-11-17
Payer: COMMERCIAL

## 2023-11-17 DIAGNOSIS — J45.20 MILD INTERMITTENT EXTRINSIC ASTHMA WITHOUT COMPLICATION: ICD-10-CM

## 2023-11-17 DIAGNOSIS — R05.3 PERSISTENT COUGH FOR 3 WEEKS OR LONGER: Primary | ICD-10-CM

## 2023-11-17 RX ORDER — FLUTICASONE PROPIONATE 110 UG/1
1 AEROSOL, METERED RESPIRATORY (INHALATION) 2 TIMES DAILY
Qty: 12 G | Refills: 0 | Status: SHIPPED | OUTPATIENT
Start: 2023-11-17 | End: 2024-01-31

## 2023-11-20 ENCOUNTER — NURSE TRIAGE (OUTPATIENT)
Dept: NURSING | Facility: CLINIC | Age: 43
End: 2023-11-20
Payer: COMMERCIAL

## 2023-11-20 ENCOUNTER — APPOINTMENT (OUTPATIENT)
Dept: RADIOLOGY | Facility: CLINIC | Age: 43
End: 2023-11-20
Attending: EMERGENCY MEDICINE
Payer: COMMERCIAL

## 2023-11-20 ENCOUNTER — HOSPITAL ENCOUNTER (EMERGENCY)
Facility: CLINIC | Age: 43
Discharge: HOME | End: 2023-11-20
Attending: EMERGENCY MEDICINE | Admitting: EMERGENCY MEDICINE
Payer: COMMERCIAL

## 2023-11-20 VITALS
HEART RATE: 88 BPM | HEIGHT: 67 IN | BODY MASS INDEX: 34.21 KG/M2 | TEMPERATURE: 98.4 F | OXYGEN SATURATION: 91 % | RESPIRATION RATE: 20 BRPM | DIASTOLIC BLOOD PRESSURE: 108 MMHG | WEIGHT: 218 LBS | SYSTOLIC BLOOD PRESSURE: 163 MMHG

## 2023-11-20 DIAGNOSIS — R06.2 WHEEZING: ICD-10-CM

## 2023-11-20 DIAGNOSIS — J40 BRONCHITIS: ICD-10-CM

## 2023-11-20 DIAGNOSIS — R06.02 SHORTNESS OF BREATH: ICD-10-CM

## 2023-11-20 LAB
ANION GAP SERPL CALCULATED.3IONS-SCNC: 13 MMOL/L (ref 7–15)
ATRIAL RATE - MUSE: 98 BPM
BASE EXCESS BLDV CALC-SCNC: -0.4 MMOL/L
BASOPHILS # BLD AUTO: 0.1 10E3/UL (ref 0–0.2)
BASOPHILS NFR BLD AUTO: 1 %
BUN SERPL-MCNC: 8 MG/DL (ref 6–20)
CALCIUM SERPL-MCNC: 9 MG/DL (ref 8.6–10)
CHLORIDE SERPL-SCNC: 102 MMOL/L (ref 98–107)
CREAT SERPL-MCNC: 0.52 MG/DL (ref 0.51–0.95)
D DIMER PPP FEU-MCNC: 0.39 UG/ML FEU (ref 0–0.5)
DEPRECATED HCO3 PLAS-SCNC: 23 MMOL/L (ref 22–29)
DIASTOLIC BLOOD PRESSURE - MUSE: 95 MMHG
EGFRCR SERPLBLD CKD-EPI 2021: >90 ML/MIN/1.73M2
EOSINOPHIL # BLD AUTO: 1.4 10E3/UL (ref 0–0.7)
EOSINOPHIL NFR BLD AUTO: 13 %
ERYTHROCYTE [DISTWIDTH] IN BLOOD BY AUTOMATED COUNT: 19.5 % (ref 10–15)
FLUAV RNA SPEC QL NAA+PROBE: NEGATIVE
FLUBV RNA RESP QL NAA+PROBE: NEGATIVE
GLUCOSE SERPL-MCNC: 106 MG/DL (ref 70–99)
HCO3 BLDV-SCNC: 25 MMOL/L (ref 24–30)
HCT VFR BLD AUTO: 34.2 % (ref 35–47)
HGB BLD-MCNC: 10.3 G/DL (ref 11.7–15.7)
IMM GRANULOCYTES # BLD: 0 10E3/UL
IMM GRANULOCYTES NFR BLD: 0 %
INTERPRETATION ECG - MUSE: NORMAL
LYMPHOCYTES # BLD AUTO: 3.1 10E3/UL (ref 0.8–5.3)
LYMPHOCYTES NFR BLD AUTO: 27 %
MCH RBC QN AUTO: 22.7 PG (ref 26.5–33)
MCHC RBC AUTO-ENTMCNC: 30.1 G/DL (ref 31.5–36.5)
MCV RBC AUTO: 75 FL (ref 78–100)
MONOCYTES # BLD AUTO: 1.1 10E3/UL (ref 0–1.3)
MONOCYTES NFR BLD AUTO: 9 %
NEUTROPHILS # BLD AUTO: 5.8 10E3/UL (ref 1.6–8.3)
NEUTROPHILS NFR BLD AUTO: 50 %
NRBC # BLD AUTO: 0 10E3/UL
NRBC BLD AUTO-RTO: 0 /100
NT-PROBNP SERPL-MCNC: <36 PG/ML (ref 0–450)
OXYHGB MFR BLDV: 69.7 % (ref 70–75)
P AXIS - MUSE: 67 DEGREES
PCO2 BLDV: 46 MM HG (ref 35–50)
PH BLDV: 7.34 [PH] (ref 7.35–7.45)
PLATELET # BLD AUTO: 344 10E3/UL (ref 150–450)
PO2 BLDV: 41 MM HG (ref 25–47)
POTASSIUM SERPL-SCNC: 3.8 MMOL/L (ref 3.4–5.3)
PR INTERVAL - MUSE: 136 MS
QRS DURATION - MUSE: 82 MS
QT - MUSE: 352 MS
QTC - MUSE: 449 MS
R AXIS - MUSE: 82 DEGREES
RBC # BLD AUTO: 4.54 10E6/UL (ref 3.8–5.2)
RSV RNA SPEC NAA+PROBE: NEGATIVE
SAO2 % BLDV: 71.4 % (ref 70–75)
SARS-COV-2 RNA RESP QL NAA+PROBE: NEGATIVE
SODIUM SERPL-SCNC: 138 MMOL/L (ref 135–145)
SYSTOLIC BLOOD PRESSURE - MUSE: 169 MMHG
T AXIS - MUSE: 49 DEGREES
TROPONIN T SERPL HS-MCNC: <6 NG/L
VENTRICULAR RATE- MUSE: 98 BPM
WBC # BLD AUTO: 11.5 10E3/UL (ref 4–11)

## 2023-11-20 PROCEDURE — 93005 ELECTROCARDIOGRAM TRACING: CPT | Performed by: EMERGENCY MEDICINE

## 2023-11-20 PROCEDURE — 82805 BLOOD GASES W/O2 SATURATION: CPT | Performed by: EMERGENCY MEDICINE

## 2023-11-20 PROCEDURE — 85379 FIBRIN DEGRADATION QUANT: CPT | Performed by: EMERGENCY MEDICINE

## 2023-11-20 PROCEDURE — 83880 ASSAY OF NATRIURETIC PEPTIDE: CPT | Performed by: EMERGENCY MEDICINE

## 2023-11-20 PROCEDURE — 250N000009 HC RX 250: Performed by: EMERGENCY MEDICINE

## 2023-11-20 PROCEDURE — 71046 X-RAY EXAM CHEST 2 VIEWS: CPT

## 2023-11-20 PROCEDURE — 99285 EMERGENCY DEPT VISIT HI MDM: CPT | Mod: 25

## 2023-11-20 PROCEDURE — 250N000012 HC RX MED GY IP 250 OP 636 PS 637: Performed by: EMERGENCY MEDICINE

## 2023-11-20 PROCEDURE — 36415 COLL VENOUS BLD VENIPUNCTURE: CPT | Performed by: EMERGENCY MEDICINE

## 2023-11-20 PROCEDURE — 84484 ASSAY OF TROPONIN QUANT: CPT | Performed by: EMERGENCY MEDICINE

## 2023-11-20 PROCEDURE — 999N000157 HC STATISTIC RCP TIME EA 10 MIN

## 2023-11-20 PROCEDURE — 85018 HEMOGLOBIN: CPT | Performed by: EMERGENCY MEDICINE

## 2023-11-20 PROCEDURE — 82310 ASSAY OF CALCIUM: CPT | Performed by: EMERGENCY MEDICINE

## 2023-11-20 PROCEDURE — 94640 AIRWAY INHALATION TREATMENT: CPT

## 2023-11-20 PROCEDURE — 87637 SARSCOV2&INF A&B&RSV AMP PRB: CPT | Performed by: EMERGENCY MEDICINE

## 2023-11-20 RX ORDER — PREDNISONE 20 MG/1
TABLET ORAL
Qty: 10 TABLET | Refills: 0 | Status: SHIPPED | OUTPATIENT
Start: 2023-11-21 | End: 2023-11-25

## 2023-11-20 RX ORDER — ALBUTEROL SULFATE 0.83 MG/ML
2.5 SOLUTION RESPIRATORY (INHALATION) EVERY 6 HOURS PRN
Qty: 120 ML | Refills: 0 | Status: SHIPPED | OUTPATIENT
Start: 2023-11-20 | End: 2024-04-15

## 2023-11-20 RX ORDER — SOFT LENS DISINFECTANT
1 SOLUTION, NON-ORAL MISCELLANEOUS EVERY 6 HOURS PRN
Qty: 1 EACH | Refills: 0 | Status: SHIPPED | OUTPATIENT
Start: 2023-11-20 | End: 2023-12-20

## 2023-11-20 RX ORDER — IPRATROPIUM BROMIDE AND ALBUTEROL SULFATE 2.5; .5 MG/3ML; MG/3ML
3 SOLUTION RESPIRATORY (INHALATION) ONCE
Status: COMPLETED | OUTPATIENT
Start: 2023-11-20 | End: 2023-11-20

## 2023-11-20 RX ORDER — PREDNISONE 20 MG/1
60 TABLET ORAL ONCE
Status: COMPLETED | OUTPATIENT
Start: 2023-11-20 | End: 2023-11-20

## 2023-11-20 RX ADMIN — IPRATROPIUM BROMIDE AND ALBUTEROL SULFATE 3 ML: .5; 3 SOLUTION RESPIRATORY (INHALATION) at 12:38

## 2023-11-20 RX ADMIN — PREDNISONE 60 MG: 20 TABLET ORAL at 13:17

## 2023-11-20 ASSESSMENT — ACTIVITIES OF DAILY LIVING (ADL): ADLS_ACUITY_SCORE: 35

## 2023-11-20 NOTE — TELEPHONE ENCOUNTER
Nurse Triage SBAR    Is this a 2nd Level Triage? NO    Situation:  Patient calling reporting she has ongoing cough x 8 weeks, that is increasing.    Background:   COVID 19 home testing negative  Chest X-ray 11/16/23  See office visit 11/16/23.  Assessment:   Patient calling reporting she has had an ongoing cough x 8 weeks that is increasing.  Patient seen in clinic 11/16/23 and started Flovent as directed along with Albuterol.  Reporting frequent dry cough, wheezing.  Stating the shortness of breath on exertion is increasing, fatigue.  O2 sats at 93 % pulse 80 during triage.  Patient stating she has felt some shortness of breath at rest.  Stating she has not yet used Albuterol treatment.      Protocol Recommended Disposition:   See in ED/UC or office with PCP approval. Patient does not have a PCP. Stating she will try emergency treatment with Albuterol now and go to ED if no improvement, otherwise plans to go to UC this morning.       Reason for Disposition   Chest pain  (Exception: Mild chest tightness that feels the same as prior asthma attacks.)    Additional Information   Negative: SEVERE asthma attack (e.g., struggling for each breath, speaks in single words, loud wheezes, pulse >120) (RED Zone)   Negative: Bluish (or gray) lips or face   Negative: Difficult to awaken or acting confused (e.g., disoriented, slurred speech)   Negative: Passed out (i.e., fainted, collapsed and was not responding)   Negative: Wheezing started suddenly after medicine, an allergic food, or bee sting   Negative: Sounds like a life-threatening emergency to the triager   Negative: MODERATE asthma attack (e.g., SOB at rest, speaks in phrases, audible wheezes) AND doesn't have neb or inhaler available   Negative: Peak flow rate less than 50% of baseline level (RED Zone)   Negative: Oxygen level (e.g., pulse oximetry) 90% or lower   Negative: Hospitalized before with asthma; now feels same   Negative: MODERATE asthma attack (e.g., SOB at  rest, speaks in phrases, audible wheezes) and not resolved after 2 or 3 inhaler or nebulizer treatments given 20 minutes apart   Negative: Peak flow rate 50-79% of baseline level (YELLOW zone) after using 2 or 3 inhaler nebulizer treatments given 20 minutes) apart    Protocols used: Asthma Attack-A-[place]

## 2023-11-20 NOTE — DISCHARGE INSTRUCTIONS
You were seen in the emergency department today for chest tightness, shortness of breath. Your symptoms improved with the nebulizer and steroids given here.     Your workup including EKG, blood work, and x-ray were all reassuring today. No signs of emergent heart or lung problem today.     As we discussed, suspect bronchitis, potential viral illness at this time.     Please use the albuterol inhaler and nebulizers and continue the steroids for the next 5 days. If symptoms persisting, please follow up with primary care for long term management and care.     Return to the ED if you develop any significant new or worsening symptoms.

## 2023-11-20 NOTE — PROGRESS NOTES
Pt on RA, SpO2 mid 90s. BS diminished;wheezing, decreased wheezing post neb. Pt reports breathing improved before nebulizer given. Pt can speak full sentence. No respiratory distress or accessory muscle used. Pt was coughing frequently. Pt reported coughing out clear secretion. Pt reported history of asthma, smoking less than a pack per day on and off, quick about two year ago.     Alvin Hodge, RT

## 2023-11-20 NOTE — ED TRIAGE NOTES
Patient presents to ED with SOB since Tuesday and chest tightness, reports dealing with a cold for the past 8 weeks.  Ericka Alejo RN.......11/20/2023 12:13 PM     Triage Assessment (Adult)       Row Name 11/20/23 1212          Triage Assessment    Airway WDL WDL        Respiratory WDL    Respiratory WDL X  SOB        Skin Circulation/Temperature WDL    Skin Circulation/Temperature WDL WDL        Cardiac WDL    Cardiac WDL WDL        Peripheral/Neurovascular WDL    Peripheral Neurovascular WDL WDL        Cognitive/Neuro/Behavioral WDL    Cognitive/Neuro/Behavioral WDL WDL

## 2023-11-25 DIAGNOSIS — R05.1 ACUTE COUGH: ICD-10-CM

## 2023-11-27 ENCOUNTER — PATIENT OUTREACH (OUTPATIENT)
Dept: FAMILY MEDICINE | Facility: CLINIC | Age: 43
End: 2023-11-27
Payer: COMMERCIAL

## 2023-11-27 RX ORDER — ALBUTEROL SULFATE 90 UG/1
AEROSOL, METERED RESPIRATORY (INHALATION)
Qty: 18 G | Refills: 0 | Status: SHIPPED | OUTPATIENT
Start: 2023-11-27 | End: 2024-01-12

## 2023-11-27 NOTE — TELEPHONE ENCOUNTER
Patient due for Pap and HPV.    Reminder done today via [employer].    02/14/24 annual--notes added

## 2023-12-02 ENCOUNTER — OFFICE VISIT (OUTPATIENT)
Dept: FAMILY MEDICINE | Facility: CLINIC | Age: 43
End: 2023-12-02
Payer: COMMERCIAL

## 2023-12-02 VITALS
HEART RATE: 79 BPM | SYSTOLIC BLOOD PRESSURE: 132 MMHG | RESPIRATION RATE: 16 BRPM | WEIGHT: 212.4 LBS | BODY MASS INDEX: 33.27 KG/M2 | TEMPERATURE: 97.9 F | OXYGEN SATURATION: 97 % | DIASTOLIC BLOOD PRESSURE: 86 MMHG

## 2023-12-02 DIAGNOSIS — R05.3 CHRONIC COUGH: Primary | ICD-10-CM

## 2023-12-02 DIAGNOSIS — J45.41 MODERATE PERSISTENT REACTIVE AIRWAY DISEASE WITH ACUTE EXACERBATION: ICD-10-CM

## 2023-12-02 PROCEDURE — 99214 OFFICE O/P EST MOD 30 MIN: CPT | Performed by: PHYSICIAN ASSISTANT

## 2023-12-02 RX ORDER — BUDESONIDE AND FORMOTEROL FUMARATE DIHYDRATE 80; 4.5 UG/1; UG/1
2 AEROSOL RESPIRATORY (INHALATION) 2 TIMES DAILY
Qty: 10.2 G | Refills: 0 | Status: SHIPPED | OUTPATIENT
Start: 2023-12-02 | End: 2024-01-31

## 2023-12-02 RX ORDER — PREDNISONE 20 MG/1
TABLET ORAL
Qty: 15 TABLET | Refills: 0 | Status: SHIPPED | OUTPATIENT
Start: 2023-12-02 | End: 2023-12-12

## 2023-12-02 NOTE — PROGRESS NOTES
Assessment & Plan:        ICD-10-CM    1. Chronic cough  R05.3 predniSONE (DELTASONE) 20 MG tablet     Adult Pulmonary Medicine  Referral     CANCELED: Adult Pulmonary Medicine  Referral      2. Moderate persistent reactive airway disease with acute exacerbation  J45.41 budesonide-formoterol (SYMBICORT) 80-4.5 MCG/ACT Inhaler     predniSONE (DELTASONE) 20 MG tablet     Adult Pulmonary Medicine  Referral     CANCELED: Adult Pulmonary Medicine  Referral            Plan/Clinical Decision Making:    Patient developed cold like illness over 2 months ago. She initially developed sinusitis and treated with Augmentin. She has had multiple visits with IM and once at ED. Negative CXR and lab workup in ED.  She has had prednisone, omeprazole and Flovent. Prednisone has worked the best for her. Did not continue with Flovent and omeprazole. Has been using Flonase daily.   Discussed using steroid inhaler on regular basis, since didn't like Flovent will start Symbicort. Discussed regular twice daily dosing.  Due to persistent symptoms that have responded to prednisone will start tapering 10 day course. Discussed risks/benefits/side effects of medication.   She is frustrated with her persistent symptoms and not having a solution with her past visits. I referred her to pulmonology for further evaluation and workup of symptoms.       At the end of the encounter, I discussed results, diagnosis, medications. Discussed red flags for immediate return to clinic/ER, as well as indications for follow up if no improvement. Patient understood and agreed to plan. Patient was stable for discharge.        Amara Hutner PA-C on 12/2/2023 at 10:43 AM          Subjective:     HPI:    Katarina is a 43 year old female who presents to clinic today for the following health issues:  Chief Complaint   Patient presents with    Sick     Been sick 9/18/23, she has had multiple doctors visits: Chronic Congestion,  coughing, and can't catch her breath. She has had rounds of abx, and prednisone which helps for the course but then symptoms come right back. She was also placed on omeprazole as it could have been reflux which also did not help her symptoms they only made it worse. She did states she had water damage in her basement and it could be possible mold causing this for her, she has had 2 chest X-Rays which was clear,      HPI    Patient with ongoing cough, congestion, hard to catch breath.   Review of chart shows treated for sinusitis for symptoms with Augmentin on 10/11/23.   Seen 11/3/23 for ongoing subacute cough and treated with prednisone, albuterol, and tessalon.   Seen again on 11/16/23 again for subacute cough. Treated with Flovent Diskus, Prilosec.   Seen in ED on 11/20/23- seen due to increase of symptoms, CXR negative, normal EKG, Normal BMP, d dimer, BNP, and troponin. Negative flu, RSV, and covid. CBC showed chronic anemia with WBC of 11.5.   Was treated with course of prednisone, duo neb at visit, and albuterol neb solution at home.     Prednisone has been working well for symptoms, but then symptoms return when off.   Was on Flovent HFA for short period of time. Quit since once it gave her a coughing fit. Has been on Flonase nasal spray regularly. Minimal sinus symptoms.     Review of Systems   Constitutional:  Negative for fever.   Respiratory:  Positive for cough, chest tightness, shortness of breath and wheezing.    Cardiovascular:  Negative for chest pain and leg swelling.         Patient Active Problem List   Diagnosis    Vitamin D Deficiency    Anemia    Generalized Anxiety Disorder    Oral Allergy Syndrome    Allergic Rhinitis    Vitamin B12 Deficiency    Scheuermann's kyphosis    Pars defect of lumbar spine    Mild major depression (H)    Hypertrophy of bone    Cervical high risk HPV (human papillomavirus) test positive        Past Medical History:   Diagnosis Date    Allergic rhinitis     Created  by [employer] updated for latest IMO load     Allergic rhinitis, cause unspecified     seasonal allergies    Anemia     Created by Conversion     Anxiety     Asthma     Carbuncle and furuncle of trunk 4/2001    R breast    Cervical high risk HPV (human papillomavirus) test positive 12/14/2022 08/23/17 NIL Pap, Neg HR HPV  12/7/22 NIL Pap, + HR HPV (not 16 or 18) Plan cotest in 1 year due 12/7/23 12/14/22 Pt was advised.     Generalized anxiety disorder     Created by Conversion     Hidradenitis 4/95    Hypertrophy of bone 10/9/2019    Added automatically from request for surgery [id number]     Mild major depression (H) 10/25/2018    Obesity, unspecified     Oral Allergy Syndrome     Created by Conversion     Pars defect of lumbar spine 5/17/2016    Personal history of tobacco use, presenting hazards to health     Scheuermann's kyphosis 5/17/2016    Vitamin B12 Deficiency     Created by Conversion     Vitamin D deficiency     Created by Conversion Replacement Utility updated for latest IMO load        Social History     Tobacco Use    Smoking status: Every Day     Types: Cigarettes     Start date: 9/3/2014     Passive exposure: Past    Smokeless tobacco: Never    Tobacco comments:     no passive exposure   Substance Use Topics    Alcohol use: Yes     Comment: Alcoholic Drinks/day: weekends             Objective:     Vitals:    12/02/23 1050   BP: 132/86   Pulse: 79   Resp: 16   Temp: 97.9  F (36.6  C)   TempSrc: Oral   SpO2: 97%   Weight: 96.3 kg (212 lb 6.4 oz)         Physical Exam   EXAM:   Pleasant, alert, appropriate appearance. NAD.  Head Exam: Normocephalic, atraumatic.  Eye Exam:  PERRLA, EOMI, non icteric/injection.    Chest/Respiratory Exam: no audible wheezing. Normal respiration rate, no increased work of breathing.     Exam was observations during visit, she refused exam.     Results:  No results found for any visits on 12/02/23.

## 2023-12-02 NOTE — PATIENT INSTRUCTIONS
Schedule with pulmonologist for further workup and evaluation for symptoms.     Use Symbicort twice daily on regular basis.

## 2023-12-03 ASSESSMENT — ENCOUNTER SYMPTOMS
FEVER: 0
SHORTNESS OF BREATH: 1
WHEEZING: 1
COUGH: 1
CHEST TIGHTNESS: 1

## 2023-12-05 ENCOUNTER — HOSPITAL ENCOUNTER (OUTPATIENT)
Dept: MAMMOGRAPHY | Facility: CLINIC | Age: 43
Discharge: HOME | End: 2023-12-05
Attending: PHYSICIAN ASSISTANT | Admitting: PHYSICIAN ASSISTANT
Payer: COMMERCIAL

## 2023-12-05 DIAGNOSIS — Z12.31 VISIT FOR SCREENING MAMMOGRAM: ICD-10-CM

## 2023-12-05 PROCEDURE — 77067 SCR MAMMO BI INCL CAD: CPT

## 2023-12-31 DIAGNOSIS — R05.1 ACUTE COUGH: ICD-10-CM

## 2024-01-02 NOTE — TELEPHONE ENCOUNTER
Prescription was sent in for acute use, if not improving, should be reevaluated in person.  It does look like she had a follow-up visit in early December and was referred to pulmonology, also started on Symbicort.  Does she have an appointment with pulmonology?    How is she responding to the Symbicort?    How often is she using albuterol?  Avelina De La Rosa, DO

## 2024-01-04 RX ORDER — ALBUTEROL SULFATE 90 UG/1
AEROSOL, METERED RESPIRATORY (INHALATION)
Qty: 18 G | Refills: 0 | OUTPATIENT
Start: 2024-01-04

## 2024-01-12 DIAGNOSIS — R05.1 ACUTE COUGH: ICD-10-CM

## 2024-01-12 RX ORDER — ALBUTEROL SULFATE 90 UG/1
2 AEROSOL, METERED RESPIRATORY (INHALATION) EVERY 6 HOURS PRN
Qty: 18 G | Refills: 0 | Status: SHIPPED | OUTPATIENT
Start: 2024-01-12 | End: 2024-01-29

## 2024-01-15 DIAGNOSIS — F41.1 GENERALIZED ANXIETY DISORDER: ICD-10-CM

## 2024-01-15 RX ORDER — CITALOPRAM HYDROBROMIDE 20 MG/1
20 TABLET ORAL DAILY
Qty: 90 TABLET | Refills: 0 | Status: SHIPPED | OUTPATIENT
Start: 2024-01-15 | End: 2024-02-14

## 2024-01-16 ENCOUNTER — MYC MEDICAL ADVICE (OUTPATIENT)
Dept: PEDIATRICS | Facility: CLINIC | Age: 44
End: 2024-01-16
Payer: COMMERCIAL

## 2024-01-17 NOTE — TELEPHONE ENCOUNTER
"Patient sent the following [employer] message back to us:  \"Thank you for your message. I believe Phyllis is trying to auto refill this prescription. I've been feeling better for about a month now. I don't need the inhaler anymore. \" Since she does not need any further refills she did not complete the ACT questionnaire.   "

## 2024-01-29 ENCOUNTER — HOSPITAL ENCOUNTER (EMERGENCY)
Facility: CLINIC | Age: 44
Discharge: HOME | End: 2024-01-29
Attending: EMERGENCY MEDICINE | Admitting: EMERGENCY MEDICINE
Payer: COMMERCIAL

## 2024-01-29 ENCOUNTER — APPOINTMENT (OUTPATIENT)
Dept: RADIOLOGY | Facility: CLINIC | Age: 44
End: 2024-01-29
Attending: EMERGENCY MEDICINE
Payer: COMMERCIAL

## 2024-01-29 VITALS
BODY MASS INDEX: 35.36 KG/M2 | HEIGHT: 66 IN | WEIGHT: 220 LBS | TEMPERATURE: 97.5 F | OXYGEN SATURATION: 97 % | HEART RATE: 90 BPM | DIASTOLIC BLOOD PRESSURE: 96 MMHG | SYSTOLIC BLOOD PRESSURE: 135 MMHG | RESPIRATION RATE: 18 BRPM

## 2024-01-29 DIAGNOSIS — J06.9 VIRAL URI WITH COUGH: ICD-10-CM

## 2024-01-29 DIAGNOSIS — R05.1 ACUTE COUGH: ICD-10-CM

## 2024-01-29 DIAGNOSIS — J98.01 BRONCHOSPASM: ICD-10-CM

## 2024-01-29 LAB
ALBUMIN SERPL BCG-MCNC: 4.3 G/DL (ref 3.5–5.2)
ALP SERPL-CCNC: 95 U/L (ref 40–150)
ALT SERPL W P-5'-P-CCNC: 23 U/L (ref 0–50)
ANION GAP SERPL CALCULATED.3IONS-SCNC: 12 MMOL/L (ref 7–15)
AST SERPL W P-5'-P-CCNC: 21 U/L (ref 0–45)
BASE EXCESS BLDV CALC-SCNC: -0.9 MMOL/L (ref -3–3)
BASOPHILS # BLD AUTO: 0 10E3/UL (ref 0–0.2)
BASOPHILS NFR BLD AUTO: 0 %
BILIRUB DIRECT SERPL-MCNC: <0.2 MG/DL (ref 0–0.3)
BILIRUB SERPL-MCNC: 0.2 MG/DL
BUN SERPL-MCNC: 9.2 MG/DL (ref 6–20)
CALCIUM SERPL-MCNC: 9.2 MG/DL (ref 8.6–10)
CHLORIDE SERPL-SCNC: 103 MMOL/L (ref 98–107)
CREAT SERPL-MCNC: 0.62 MG/DL (ref 0.51–0.95)
CRP SERPL-MCNC: 5.29 MG/L
DEPRECATED HCO3 PLAS-SCNC: 25 MMOL/L (ref 22–29)
EGFRCR SERPLBLD CKD-EPI 2021: >90 ML/MIN/1.73M2
EOSINOPHIL # BLD AUTO: 1.7 10E3/UL (ref 0–0.7)
EOSINOPHIL NFR BLD AUTO: 14 %
ERYTHROCYTE [DISTWIDTH] IN BLOOD BY AUTOMATED COUNT: 17.9 % (ref 10–15)
FLUAV RNA SPEC QL NAA+PROBE: NEGATIVE
FLUBV RNA RESP QL NAA+PROBE: NEGATIVE
GLUCOSE SERPL-MCNC: 131 MG/DL (ref 70–99)
HCO3 BLDV-SCNC: 25 MMOL/L (ref 21–28)
HCT VFR BLD AUTO: 34 % (ref 35–47)
HGB BLD-MCNC: 10 G/DL (ref 11.7–15.7)
IMM GRANULOCYTES # BLD: 0.1 10E3/UL
IMM GRANULOCYTES NFR BLD: 0 %
INR PPP: 1.08 (ref 0.85–1.15)
LYMPHOCYTES # BLD AUTO: 2.5 10E3/UL (ref 0.8–5.3)
LYMPHOCYTES NFR BLD AUTO: 21 %
MAGNESIUM SERPL-MCNC: 2.1 MG/DL (ref 1.7–2.3)
MCH RBC QN AUTO: 22 PG (ref 26.5–33)
MCHC RBC AUTO-ENTMCNC: 29.4 G/DL (ref 31.5–36.5)
MCV RBC AUTO: 75 FL (ref 78–100)
MONOCYTES # BLD AUTO: 0.8 10E3/UL (ref 0–1.3)
MONOCYTES NFR BLD AUTO: 7 %
NEUTROPHILS # BLD AUTO: 6.7 10E3/UL (ref 1.6–8.3)
NEUTROPHILS NFR BLD AUTO: 58 %
NRBC # BLD AUTO: 0 10E3/UL
NRBC BLD AUTO-RTO: 0 /100
NT-PROBNP SERPL-MCNC: 37 PG/ML (ref 0–450)
O2/TOTAL GAS SETTING VFR VENT: 0 %
OXYHGB MFR BLDV: 24 % (ref 70–75)
PCO2 BLDV: 49 MM HG (ref 40–50)
PH BLDV: 7.32 [PH] (ref 7.32–7.43)
PLATELET # BLD AUTO: 316 10E3/UL (ref 150–450)
PO2 BLDV: 20 MM HG (ref 25–47)
POTASSIUM SERPL-SCNC: 3.4 MMOL/L (ref 3.4–5.3)
PROCALCITONIN SERPL IA-MCNC: 0.03 NG/ML
PROT SERPL-MCNC: 7.5 G/DL (ref 6.4–8.3)
RBC # BLD AUTO: 4.55 10E6/UL (ref 3.8–5.2)
RSV RNA SPEC NAA+PROBE: NEGATIVE
SAO2 % BLDV: 23.9 % (ref 70–75)
SARS-COV-2 RNA RESP QL NAA+PROBE: NEGATIVE
SODIUM SERPL-SCNC: 140 MMOL/L (ref 135–145)
TSH SERPL DL<=0.005 MIU/L-ACNC: 1.91 UIU/ML (ref 0.3–4.2)
WBC # BLD AUTO: 11.7 10E3/UL (ref 4–11)

## 2024-01-29 PROCEDURE — 80053 COMPREHEN METABOLIC PANEL: CPT | Performed by: EMERGENCY MEDICINE

## 2024-01-29 PROCEDURE — 71045 X-RAY EXAM CHEST 1 VIEW: CPT

## 2024-01-29 PROCEDURE — 93005 ELECTROCARDIOGRAM TRACING: CPT | Performed by: EMERGENCY MEDICINE

## 2024-01-29 PROCEDURE — 36415 COLL VENOUS BLD VENIPUNCTURE: CPT | Performed by: EMERGENCY MEDICINE

## 2024-01-29 PROCEDURE — 85610 PROTHROMBIN TIME: CPT | Performed by: EMERGENCY MEDICINE

## 2024-01-29 PROCEDURE — 82805 BLOOD GASES W/O2 SATURATION: CPT | Performed by: EMERGENCY MEDICINE

## 2024-01-29 PROCEDURE — 99285 EMERGENCY DEPT VISIT HI MDM: CPT | Mod: 25

## 2024-01-29 PROCEDURE — 250N000009 HC RX 250: Performed by: EMERGENCY MEDICINE

## 2024-01-29 PROCEDURE — 84443 ASSAY THYROID STIM HORMONE: CPT | Performed by: EMERGENCY MEDICINE

## 2024-01-29 PROCEDURE — 86140 C-REACTIVE PROTEIN: CPT | Performed by: EMERGENCY MEDICINE

## 2024-01-29 PROCEDURE — 83880 ASSAY OF NATRIURETIC PEPTIDE: CPT | Performed by: EMERGENCY MEDICINE

## 2024-01-29 PROCEDURE — 85025 COMPLETE CBC W/AUTO DIFF WBC: CPT | Performed by: EMERGENCY MEDICINE

## 2024-01-29 PROCEDURE — 94640 AIRWAY INHALATION TREATMENT: CPT | Mod: 76

## 2024-01-29 PROCEDURE — 96365 THER/PROPH/DIAG IV INF INIT: CPT

## 2024-01-29 PROCEDURE — 83735 ASSAY OF MAGNESIUM: CPT | Performed by: EMERGENCY MEDICINE

## 2024-01-29 PROCEDURE — 87637 SARSCOV2&INF A&B&RSV AMP PRB: CPT | Performed by: EMERGENCY MEDICINE

## 2024-01-29 PROCEDURE — 250N000011 HC RX IP 250 OP 636: Performed by: EMERGENCY MEDICINE

## 2024-01-29 PROCEDURE — 84145 PROCALCITONIN (PCT): CPT | Performed by: EMERGENCY MEDICINE

## 2024-01-29 RX ORDER — IPRATROPIUM BROMIDE AND ALBUTEROL SULFATE 2.5; .5 MG/3ML; MG/3ML
3 SOLUTION RESPIRATORY (INHALATION) ONCE
Status: COMPLETED | OUTPATIENT
Start: 2024-01-29 | End: 2024-01-29

## 2024-01-29 RX ORDER — PREDNISONE 20 MG/1
TABLET ORAL
Qty: 10 TABLET | Refills: 0 | Status: SHIPPED | OUTPATIENT
Start: 2024-01-30 | End: 2024-02-14

## 2024-01-29 RX ORDER — MAGNESIUM SULFATE HEPTAHYDRATE 40 MG/ML
2 INJECTION, SOLUTION INTRAVENOUS ONCE
Status: COMPLETED | OUTPATIENT
Start: 2024-01-29 | End: 2024-01-29

## 2024-01-29 RX ORDER — ALBUTEROL SULFATE 90 UG/1
2 AEROSOL, METERED RESPIRATORY (INHALATION) EVERY 6 HOURS PRN
Qty: 18 G | Refills: 3 | Status: SHIPPED | OUTPATIENT
Start: 2024-01-29

## 2024-01-29 RX ADMIN — MAGNESIUM SULFATE HEPTAHYDRATE 2 G: 40 INJECTION, SOLUTION INTRAVENOUS at 20:58

## 2024-01-29 RX ADMIN — IPRATROPIUM BROMIDE AND ALBUTEROL SULFATE 3 ML: .5; 3 SOLUTION RESPIRATORY (INHALATION) at 21:03

## 2024-01-29 ASSESSMENT — ACTIVITIES OF DAILY LIVING (ADL): ADLS_ACUITY_SCORE: 35

## 2024-01-30 LAB
ATRIAL RATE - MUSE: 79 BPM
DIASTOLIC BLOOD PRESSURE - MUSE: 74 MMHG
INTERPRETATION ECG - MUSE: NORMAL
P AXIS - MUSE: 41 DEGREES
PR INTERVAL - MUSE: 144 MS
QRS DURATION - MUSE: 88 MS
QT - MUSE: 392 MS
QTC - MUSE: 449 MS
R AXIS - MUSE: 23 DEGREES
SYSTOLIC BLOOD PRESSURE - MUSE: 149 MMHG
T AXIS - MUSE: 72 DEGREES
VENTRICULAR RATE- MUSE: 79 BPM

## 2024-01-30 ASSESSMENT — ASTHMA QUESTIONNAIRES
ACT_TOTALSCORE: 8
QUESTION_4 LAST FOUR WEEKS HOW OFTEN HAVE YOU USED YOUR RESCUE INHALER OR NEBULIZER MEDICATION (SUCH AS ALBUTEROL): THREE OR MORE TIMES PER DAY
QUESTION_5 LAST FOUR WEEKS HOW WOULD YOU RATE YOUR ASTHMA CONTROL: POORLY CONTROLLED
EMERGENCY_ROOM_LAST_YEAR_TOTAL: TWO
QUESTION_1 LAST FOUR WEEKS HOW MUCH OF THE TIME DID YOUR ASTHMA KEEP YOU FROM GETTING AS MUCH DONE AT WORK, SCHOOL OR AT HOME: SOME OF THE TIME
ACT_TOTALSCORE: 8
QUESTION_2 LAST FOUR WEEKS HOW OFTEN HAVE YOU HAD SHORTNESS OF BREATH: MORE THAN ONCE A DAY
QUESTION_3 LAST FOUR WEEKS HOW OFTEN DID YOUR ASTHMA SYMPTOMS (WHEEZING, COUGHING, SHORTNESS OF BREATH, CHEST TIGHTNESS OR PAIN) WAKE YOU UP AT NIGHT OR EARLIER THAN USUAL IN THE MORNING: FOUR OR MORE NIGHTS A WEEK

## 2024-01-30 NOTE — DISCHARGE INSTRUCTIONS
Use your albuterol for any wheezing or shortness of breath.    Use the prednisone to help reduce the inflammation in your lungs.    As needed for pain control at home, take:  - over-the-counter ibuprofen 800mg by mouth every 8 hours (max dose 2400mg in 24 hours)  - over-the-counter acetaminophen 1g by mouth every 6 hours (max dose 4g in 24 hours)    To decrease cough, you can try:  - honey (either spoonful or mixed in tea)  - over-the-counter Robitussin-DM cough syrup    For nasal congestion, take the following over-the-counter medications:  - cetirizine (Zyrtec) 10mg by mouth one time daily as needed  - pseudoephedrine (Sudafed) 60mg by mouth every 4-6 hours as needed  - oxymetazoline (Afrin) nasal spray 2 sprays each nostril 2 times daily as needed (do not use this medication for more than 3 days as this can cause rebound nasal congestion when you stop the medication)    Follow up with your Primary Care provider in 2 days for a recheck.    Return to the Emergency Department for any difficulty breathing, persistent vomiting, severe worsening, or any other concerns.

## 2024-01-30 NOTE — ED PROVIDER NOTES
EMERGENCY DEPARTMENT ENCOUNTER      NAME: Katarina Mcclain  AGE: 44 year old female  YOB: 1980  MRN: [id number]  EVALUATION DATE & TIME: 1/29/2024  8:17 PM    PCP: No Ref-Primary, Physician    ED PROVIDER: Elgin Jara M.D.      Chief Complaint   Patient presents with    Shortness of Breath         IMPRESSION  1. Viral URI with cough    2. Bronchospasm    3. Acute cough        PLAN  - refill albuterol inhaler (given spacer for home)  - prednisone 40mg daily x5 days  - close PCP follow up  - discharge to home    ED COURSE & MEDICAL DECISION MAKING    ED Course as of 01/29/24 2202 Mon Jan 29, 2024 2028 44yoF with history of obesity (s/p tova-en-y gastric bypass), anxiety, allergic rhinitis (states she was prescribed albuterol in the past for this but has no diagnosed asthma or COPD) presenting per EMS from home for evaluation of cough, wheezing, & shortness of breath. Reports 1.5 weeks of progressive dry cough, wheezing, shortness of breath (worse with coughing), runny nose, nasal congestion. No fevers, sweats, chills. Wheezing & shortness of breath worsened tonight so called EMS; given Duoneb x2 and solumedrol 150mg IV en route---states she feels improved here now but not back to baseline. Denies any chest pain, pleuritic or exertional symptoms, leg pain/swelling.    Normal vitals on presentation. Mild anxious affect on exam with tight air entry bilaterally with scattered faint expiratory wheezes, normal work of breathing, benign abdomen, no peripheral edema or calf tenderness, normal neuro exam.    Sounds like viral-induced bronchospasm per presentation to this point. Will obtain EKG, blood, viral swab, CXR to evaluate while giving Duoeb & IV mag. Patient comfortable with this plan; no further questions at this time.   2045 CXR independently reviewed & interpreted by me: no acute cardiopulmonary process.   2149 COVID/influenza/RSV swab negative; still most suspect other nonspecific viral  process. Labs with procal within normal limits, WBC 11, CRP just 5, no TANIKA, no glaring electrolyte abnormality, unremarkable VBG, normal LFTs, BNP within normal limits.    On recheck, patient asymptomatic with clear lungs and normal work of breathing. Ok for outpatient management. Patient able to tolerate PO and walk without difficulty. Patient comfortable with discharge at this time. Return precautions and need for PCP follow up discussed and understood. No further questions at the time of discharge.       --------------------------------------------------------------------------------   --------------------------------------------------------------------------------         This patient involved a high degree of complexity in medical decision making, as significant risks were present and assessed. Recent encounters & results in medical record reviewed by me.    All workup (i.e. any EKG/labs/imaging as per charting below) reviewed and independently interpreted by me. See respective sections for details.    Broad differential considered for this patient, including but not limited to:  viral URI, bronchospasm, bacterial pneumonia, sepsis, ACS, PE, acute aortic syndrome, pneumothorax, Boerhaave's, tamponade, anemia, TANIKA, electrolyte derangement, thyroid disease, anxiety      See additional MDM below if interested.    MEDICATIONS GIVEN IN THE EMERGENCY DEPARTMENT  Medications   ipratropium - albuterol 0.5 mg/2.5 mg/3 mL (DUONEB) neb solution 3 mL (3 mLs Nebulization $Given 1/29/24 2103)   magnesium sulfate 2 g in 50 mL sterile water intermittent infusion (2 g Intravenous $New Bag 1/29/24 2058)       NEW PRESCRIPTIONS STARTED AT TODAY'S ER VISIT  Current Discharge Medication List        START taking these medications    Details   predniSONE (DELTASONE) 20 MG tablet Take two tablets (= 40mg) each day for 5 (five) days  Qty: 10 tablet, Refills: 0           CONTINUE these medications which have CHANGED    Details    albuterol (PROAIR HFA/PROVENTIL HFA/VENTOLIN HFA) 108 (90 Base) MCG/ACT inhaler Inhale 2 puffs into the lungs every 6 hours as needed for shortness of breath, wheezing or cough  Qty: 18 g, Refills: 3    Comments: Pharmacy may dispense brand covered by insurance (Proair, or proventil or ventolin or generic albuterol inhaler)  Associated Diagnoses: Acute cough           CONTINUE these medications which have NOT CHANGED    Details   albuterol (PROVENTIL) (2.5 MG/3ML) 0.083% neb solution Take 1 vial (2.5 mg) by nebulization every 6 hours as needed for shortness of breath, wheezing or cough  Qty: 120 mL, Refills: 0      benzonatate (TESSALON) 100 MG capsule Take 2 capsules (200 mg) by mouth 3 times daily as needed for cough  Qty: 30 capsule, Refills: 0    Associated Diagnoses: Acute cough      budesonide-formoterol (SYMBICORT) 80-4.5 MCG/ACT Inhaler Inhale 2 puffs into the lungs 2 times daily  Qty: 10.2 g, Refills: 0    Associated Diagnoses: Moderate persistent reactive airway disease with acute exacerbation      cetirizine (ZYRTEC) 5 MG tablet Take 5 mg by mouth daily      citalopram (CELEXA) 20 MG tablet TAKE 1 TABLET(20 MG) BY MOUTH DAILY  Qty: 90 tablet, Refills: 0    Associated Diagnoses: Generalized anxiety disorder      dextromethorphan-guaiFENesin (MUCINEX DM)  MG per 12 hr tablet Take 1 tablet by mouth every 12 hours      etonogestrel (NEXPLANON) 68 MG IMPL 1 each by Subdermal route once Left tricep area      famotidine (PEPCID) 10 MG tablet Take 10 mg by mouth 2 times daily      fluticasone (FLOVENT HFA) 110 MCG/ACT inhaler Inhale 1 puff into the lungs 2 times daily  Qty: 12 g, Refills: 0    Comments: Pharmacy may dispense brand if preferred by insurance.  Associated Diagnoses: Persistent cough for 3 weeks or longer; Mild intermittent extrinsic asthma without complication      multivitamin w/minerals (MULTI-VITAMIN) tablet Take 1 tablet by mouth daily      omeprazole (PRILOSEC OTC) 20 MG EC tablet Take  20 mg by mouth 2 times daily      omeprazole (PRILOSEC) 20 MG DR capsule Take 1 capsule (20 mg) by mouth 2 times daily  Qty: 180 capsule, Refills: 0    Associated Diagnoses: Persistent cough for 3 weeks or longer; Personal history of COVID-19; Gastroesophageal reflux disease without esophagitis                 =================================================================      HPI  Katarina Mcclain is a 44 year old female with history of obesity (s/p tova-en-y gastric bypass), anxiety, allergic rhinitis (states she was prescribed albuterol in the past for this but has no diagnosed asthma or COPD) presenting per EMS from home for evaluation of cough, wheezing, & shortness of breath. Reports 1.5 weeks of progressive dry cough, wheezing, shortness of breath (worse with coughing), runny nose, nasal congestion. No fevers, sweats, chills. Wheezing & shortness of breath worsened tonight so called EMS; given Duoneb x2 and solumedrol 150mg IV en route---states she feels improved here now but not back to baseline. Denies any chest pain, pleuritic or exertional symptoms, leg pain/swelling.          --------------- MEDICAL HISTORY ---------------  PAST MEDICAL HISTORY:  Reviewed by me.  Past Medical History:   Diagnosis Date    Allergic rhinitis     Created by Conversion Replacement Utility updated for latest IMO load     Allergic rhinitis, cause unspecified     seasonal allergies    Anemia     Created by Conversion     Anxiety     Asthma     Carbuncle and furuncle of trunk 4/2001    R breast    Cervical high risk HPV (human papillomavirus) test positive 12/14/2022 08/23/17 NIL Pap, Neg HR HPV  12/7/22 NIL Pap, + HR HPV (not 16 or 18) Plan cotest in 1 year due 12/7/23 12/14/22 Pt was advised.     Generalized anxiety disorder     Created by Conversion     Hidradenitis 4/95    Hypertrophy of bone 10/9/2019    Added automatically from request for surgery [id number]     Mild major depression (H) 10/25/2018    Obesity, unspecified      Oral Allergy Syndrome     Created by Conversion     Pars defect of lumbar spine 5/17/2016    Personal history of tobacco use, presenting hazards to health     Scheuermann's kyphosis 5/17/2016    Vitamin B12 Deficiency     Created by Conversion     Vitamin D deficiency     Created by Conversion Replacement Utility updated for latest IMO load      Patient Active Problem List   Diagnosis    Vitamin D Deficiency    Anemia    Generalized Anxiety Disorder    Oral Allergy Syndrome    Allergic Rhinitis    Vitamin B12 Deficiency    Scheuermann's kyphosis    Pars defect of lumbar spine    Mild major depression (H)    Hypertrophy of bone    Cervical high risk HPV (human papillomavirus) test positive       PAST SURGICAL HISTORY:  Reviewed by me.  Past Surgical History:   Procedure Laterality Date    REVISION AMADOU-EN-Y      ZZC GASTRIC BYPASS,OBESE<100CM AMADOU-EN-Y  2/27/04    [facility]       CURRENT MEDICATIONS:    Reviewed by me.  No current facility-administered medications for this encounter.    Current Outpatient Medications:     albuterol (PROAIR HFA/PROVENTIL HFA/VENTOLIN HFA) 108 (90 Base) MCG/ACT inhaler, Inhale 2 puffs into the lungs every 6 hours as needed for shortness of breath, wheezing or cough, Disp: 18 g, Rfl: 3    [START ON 1/30/2024] predniSONE (DELTASONE) 20 MG tablet, Take two tablets (= 40mg) each day for 5 (five) days, Disp: 10 tablet, Rfl: 0    albuterol (PROVENTIL) (2.5 MG/3ML) 0.083% neb solution, Take 1 vial (2.5 mg) by nebulization every 6 hours as needed for shortness of breath, wheezing or cough, Disp: 120 mL, Rfl: 0    benzonatate (TESSALON) 100 MG capsule, Take 2 capsules (200 mg) by mouth 3 times daily as needed for cough (Patient not taking: Reported on 11/16/2023), Disp: 30 capsule, Rfl: 0    budesonide-formoterol (SYMBICORT) 80-4.5 MCG/ACT Inhaler, Inhale 2 puffs into the lungs 2 times daily, Disp: 10.2 g, Rfl: 0    cetirizine (ZYRTEC) 5 MG tablet, Take 5 mg by mouth daily,  Disp: , Rfl:     citalopram (CELEXA) 20 MG tablet, TAKE 1 TABLET(20 MG) BY MOUTH DAILY, Disp: 90 tablet, Rfl: 0    dextromethorphan-guaiFENesin (MUCINEX DM)  MG per 12 hr tablet, Take 1 tablet by mouth every 12 hours, Disp: , Rfl:     etonogestrel (NEXPLANON) 68 MG IMPL, 1 each by Subdermal route once Left tricep area (Patient not taking: Reported on 12/2/2023), Disp: , Rfl:     famotidine (PEPCID) 10 MG tablet, Take 10 mg by mouth 2 times daily (Patient not taking: Reported on 12/2/2023), Disp: , Rfl:     fluticasone (FLOVENT HFA) 110 MCG/ACT inhaler, Inhale 1 puff into the lungs 2 times daily (Patient not taking: Reported on 12/2/2023), Disp: 12 g, Rfl: 0    multivitamin w/minerals (MULTI-VITAMIN) tablet, Take 1 tablet by mouth daily, Disp: , Rfl:     omeprazole (PRILOSEC OTC) 20 MG EC tablet, Take 20 mg by mouth 2 times daily (Patient not taking: Reported on 12/2/2023), Disp: , Rfl:     omeprazole (PRILOSEC) 20 MG DR capsule, Take 1 capsule (20 mg) by mouth 2 times daily, Disp: 180 capsule, Rfl: 0    ALLERGIES:  Reviewed by me.  Allergies   Allergen Reactions    Mold     Other Environmental Allergy Other (See Comments)     Seasonal-ragweed, birch, dust mites    Oxycodone-Acetaminophen Nausea and Vomiting       FAMILY HISTORY:  Reviewed by me.  Family History   Problem Relation Age of Onset    Hypertension Other         grandfather    Eye Disorder Other         grandfather cataracts    Diabetes Other         uncle    Hypertension Other         uncle    Heart Disease Other         grandfather heart disease    Heart Disease Other         grandfather heart disease    Thyroid Disease Mother     Breast Cancer Maternal Aunt     Breast Cancer Maternal Aunt     Breast Cancer Maternal Aunt        SOCIAL HISTORY:   Reviewed by me.  Social History     Socioeconomic History    Marital status: Single     Spouse name: None    Number of children: 0    Years of education: None    Highest education level: None   Tobacco Use     Smoking status: Every Day     Types: Cigarettes     Start date: 9/3/2014     Passive exposure: Past    Smokeless tobacco: Never    Tobacco comments:     no passive exposure   Vaping Use    Vaping Use: Never used   Substance and Sexual Activity    Alcohol use: Yes     Comment: Alcoholic Drinks/day: weekends    Drug use: No   Other Topics Concern    Caffeine Concern No    Exercise No    Seat Belt Yes    Self-Exams No     Social Determinants of Health     Financial Resource Strain: Low Risk  (11/16/2023)    Financial Resource Strain     Within the past 12 months, have you or your family members you live with been unable to get utilities (heat, electricity) when it was really needed?: No   Food Insecurity: Low Risk  (11/16/2023)    Food Insecurity     Within the past 12 months, did you worry that your food would run out before you got money to buy more?: No     Within the past 12 months, did the food you bought just not last and you didn t have money to get more?: No   Transportation Needs: Low Risk  (11/16/2023)    Transportation Needs     Within the past 12 months, has lack of transportation kept you from medical appointments, getting your medicines, non-medical meetings or appointments, work, or from getting things that you need?: No   Interpersonal Safety: Low Risk  (10/11/2023)    Interpersonal Safety     Do you feel physically and emotionally safe where you currently live?: Yes     Within the past 12 months, have you been hit, slapped, kicked or otherwise physically hurt by someone?: No     Within the past 12 months, have you been humiliated or emotionally abused in other ways by your partner or ex-partner?: No   Housing Stability: Low Risk  (11/16/2023)    Housing Stability     Do you have housing? : Yes     Are you worried about losing your housing?: No         --------------- PHYSICAL EXAM ---------------  Nursing notes and vitals independently reviewed by me.  VITALS:  Vitals:    01/29/24 2021   BP: (!)  "149/74   Pulse: 90   Resp: 18   Temp: 97.5  F (36.4  C)   TempSrc: Oral   SpO2: 97%   Weight: 99.8 kg (220 lb)   Height: 1.676 m (5' 6\")       PHYSICAL EXAM:    General:  alert, interactive, no distress, mild anxious affect  Eyes:  conjunctivae clear, conjugate gaze  HENT:  atraumatic, nose with no rhinorrhea, oropharynx clear  Neck:  no meningismus  Cardiovascular:  HR 70s during exam, regular rhythm, no murmurs, brisk cap refill  Chest:  no chest wall tenderness  Pulmonary:  no stridor, normal phonation, normal work of breathing, tight air entry bilaterally with scattered faint expiratory wheezes  Abdomen:  soft, nondistended, nontender  :  no CVA tenderness  Back:  no midline spinal tenderness  Musculoskeletal:  no pretibial edema, no calf tenderness. Gross ROM intact to joints of extremities with no obvious deformities.  Skin:  warm, dry, no rash  Neuro:  awake, alert, answers questions appropriately, follows commands, moves all limbs  Psych:  mild anxious affect      --------------- RESULTS ---------------  EKG:    Reviewed and independently interpreted by me.  - NSR at 79bpm, no ST or T wave changes, normal intervals  - unchanged from prior on 11/20/23  My read.    LAB:  Reviewed and independently interpreted by me.  Results for orders placed or performed during the hospital encounter of 01/29/24   XR Chest Port 1 View    Impression    IMPRESSION: Stable chest with small esophageal hiatal hernia. No acute cardiopulmonary abnormalities.   Basic metabolic panel   Result Value Ref Range    Sodium 140 135 - 145 mmol/L    Potassium 3.4 3.4 - 5.3 mmol/L    Chloride 103 98 - 107 mmol/L    Carbon Dioxide (CO2) 25 22 - 29 mmol/L    Anion Gap 12 7 - 15 mmol/L    Urea Nitrogen 9.2 6.0 - 20.0 mg/dL    Creatinine 0.62 0.51 - 0.95 mg/dL    GFR Estimate >90 >60 mL/min/1.73m2    Calcium 9.2 8.6 - 10.0 mg/dL    Glucose 131 (H) 70 - 99 mg/dL   Hepatic function panel   Result Value Ref Range    Protein Total 7.5 6.4 - 8.3 g/dL "    Albumin 4.3 3.5 - 5.2 g/dL    Bilirubin Total 0.2 <=1.2 mg/dL    Alkaline Phosphatase 95 40 - 150 U/L    AST 21 0 - 45 U/L    ALT 23 0 - 50 U/L    Bilirubin Direct <0.20 0.00 - 0.30 mg/dL   Result Value Ref Range    Procalcitonin 0.03 <0.50 ng/mL   Result Value Ref Range    INR 1.08 0.85 - 1.15   Result Value Ref Range    Magnesium 2.1 1.7 - 2.3 mg/dL   TSH with free T4 reflex   Result Value Ref Range    TSH 1.91 0.30 - 4.20 uIU/mL   Blood gas venous   Result Value Ref Range    pH Venous 7.32 7.32 - 7.43    pCO2 Venous 49 40 - 50 mm Hg    pO2 Venous 20 (L) 25 - 47 mm Hg    Bicarbonate Venous 25 21 - 28 mmol/L    Base Excess/Deficit Venous -0.9 -3.0 - 3.0 mmol/L    FIO2 0     Oxyhemoglobin Venous 24 (L) 70 - 75 %    O2 Sat, Venous 23.9 (L) 70.0 - 75.0 %   Nt probnp inpatient (BNP)   Result Value Ref Range    N terminal Pro BNP Inpatient 37 0 - 450 pg/mL   Result Value Ref Range    CRP Inflammation 5.29 (H) <5.00 mg/L   Symptomatic Influenza A/B, RSV, & SARS-CoV2 PCR (COVID-19) Nose    Specimen: Nose; Swab   Result Value Ref Range    Influenza A PCR Negative Negative    Influenza B PCR Negative Negative    RSV PCR Negative Negative    SARS CoV2 PCR Negative Negative   CBC with platelets and differential   Result Value Ref Range    WBC Count 11.7 (H) 4.0 - 11.0 10e3/uL    RBC Count 4.55 3.80 - 5.20 10e6/uL    Hemoglobin 10.0 (L) 11.7 - 15.7 g/dL    Hematocrit 34.0 (L) 35.0 - 47.0 %    MCV 75 (L) 78 - 100 fL    MCH 22.0 (L) 26.5 - 33.0 pg    MCHC 29.4 (L) 31.5 - 36.5 g/dL    RDW 17.9 (H) 10.0 - 15.0 %    Platelet Count 316 150 - 450 10e3/uL    % Neutrophils 58 %    % Lymphocytes 21 %    % Monocytes 7 %    % Eosinophils 14 %    % Basophils 0 %    % Immature Granulocytes 0 %    NRBCs per 100 WBC 0 <1 /100    Absolute Neutrophils 6.7 1.6 - 8.3 10e3/uL    Absolute Lymphocytes 2.5 0.8 - 5.3 10e3/uL    Absolute Monocytes 0.8 0.0 - 1.3 10e3/uL    Absolute Eosinophils 1.7 (H) 0.0 - 0.7 10e3/uL    Absolute Basophils 0.0 0.0 -  0.2 10e3/uL    Absolute Immature Granulocytes 0.1 <=0.4 10e3/uL    Absolute NRBCs 0.0 10e3/uL       RADIOLOGY:  Reviewed and independently interpreted by me. Please see official radiology report.  Recent Results (from the past 24 hour(s))   XR Chest Port 1 View    Narrative    EXAM: XR CHEST PORT 1 VIEW  LOCATION: [facility]  DATE: 1/29/2024    INDICATION: SOB, cough, wheezing  COMPARISON: 11/20/2023      Impression    IMPRESSION: Stable chest with small esophageal hiatal hernia. No acute cardiopulmonary abnormalities.         PROCEDURES:   Procedures   --------------------------------------------------------------------------------   Cardiac telemetry monitoring ordered, reviewed, and independently interpreted by me while patient was in the Emergency Department. Revealed no acute abnormalities.  --------------------------------------------------------------------------------             --------------- ADDITIONAL MDM ---------------  History:  - I considered systemic symptoms of the presenting illness.  - Supplemental history from:       -- patient, EMS  - External Record(s) reviewed:       -- Inpatient/outpatient record (clinic visit 12/2/23), prior labs (blood 11/20/23), prior imaging (CXR 11/20/23)       -- see above ED course & MDM for further details    Workup:  - Chart documentation above includes differential considered and any EKGs or imaging independently interpreted by provider.  - In additional to work up documented, I considered the following work up:       -- CTA chest/abdomen/pelvis       -- see above ED course & MDM for further details    External Consultation:  - Discussion of management with another provider:       -- ED pharmacist re: meds       -- see above charting for additional    Complicating Factors:  - Care impacted by chronic illness:       -- see above MDM, past medical history, & problem list  - Care affected by social determinants of health:       -- see above  social history       -- Access to Affordable Healthcare    Disposition Considerations:  - Discharge       -- I considered escalation of care with admission to the hospital, but ultimately discharged the patient per decision making above       -- I recommended the patient continue their current prescription strength medication(s) as charted above in current medications list       -- I prescribed prescription strength medication(s) as charted above       -- I recommended over-the-counter medication(s) as charted above & in discharge instructions         Elgin Jara MD  01/29/24  Emergency Medicine  [facility] EMERGENCY ROOM  [address] [place] [id number]  [phone]  Dept: [phone]     Elgin Jraa MD  01/29/24 3705

## 2024-01-30 NOTE — ED TRIAGE NOTES
"Arrival by EMS. Reports SOB that may have occurred d/t molding in baseline. Reports mold allergy. She has had these symptoms in the past. Given 500 ml NS bolus, duo neb, albuterol neb and solu medrol in route. ABCs intact. A&Ox 4.      Triage Assessment (Adult)       Row Name 01/29/24 2025          Triage Assessment    Airway WDL WDL        Respiratory WDL    Respiratory WDL X;cough;rhythm/pattern     Rhythm/Pattern, Respiratory shortness of breath     Cough Type dry        Skin Circulation/Temperature WDL    Skin Circulation/Temperature WDL WDL        Cardiac WDL    Cardiac WDL X  \"Chest tightness\"        Peripheral/Neurovascular WDL    Peripheral Neurovascular WDL WDL        Cognitive/Neuro/Behavioral WDL    Cognitive/Neuro/Behavioral WDL WDL                     "

## 2024-01-31 ENCOUNTER — VIRTUAL VISIT (OUTPATIENT)
Dept: PEDIATRICS | Facility: CLINIC | Age: 44
End: 2024-01-31
Payer: COMMERCIAL

## 2024-01-31 DIAGNOSIS — J06.9 VIRAL URI WITH COUGH: ICD-10-CM

## 2024-01-31 DIAGNOSIS — R05.3 CHRONIC COUGH: Primary | ICD-10-CM

## 2024-01-31 DIAGNOSIS — Z09 HOSPITAL DISCHARGE FOLLOW-UP: Primary | ICD-10-CM

## 2024-01-31 DIAGNOSIS — J45.41 MODERATE PERSISTENT ASTHMA WITH ACUTE EXACERBATION: ICD-10-CM

## 2024-01-31 PROCEDURE — 99214 OFFICE O/P EST MOD 30 MIN: CPT | Mod: 95 | Performed by: NURSE PRACTITIONER

## 2024-01-31 RX ORDER — BUDESONIDE AND FORMOTEROL FUMARATE DIHYDRATE 160; 4.5 UG/1; UG/1
2 AEROSOL RESPIRATORY (INHALATION) 2 TIMES DAILY
Qty: 10.2 G | Refills: 0 | Status: SHIPPED | OUTPATIENT
Start: 2024-01-31 | End: 2024-02-14

## 2024-01-31 NOTE — PROGRESS NOTES
Katarina is a 44 year old who is being evaluated via a billable video visit.      How would you like to obtain your AVS? MyChart  If the video visit is dropped, the invitation should be resent by: Text to cell phone: [phone]  Will anyone else be joining your video visit? No      Assessment & Plan     Hospital discharge follow-up  Viral URI with cough  Continue with albuterol as needed.    Continue with prednisone.    Moderate persistent asthma with acute exacerbation  Exacerbation Monday, 1/29.    Start Symbicort daily.  Increased from 80 to 160mcg. Make appointment with pulmonology.  Cetirizine daily.  - budesonide-formoterol (SYMBICORT) 160-4.5 MCG/ACT Inhaler; Inhale 2 puffs into the lungs 2 times daily    Subjective   Katarina is a 44 year old, presenting for the following health issues:    No chief complaint on file.    [facility]       Hospital Follow-up Visit:    Hospital/Nursing Home/IP Rehab Facility: [facility]  Date of Admission: 1/29/2024  Date of Discharge: 1/29/2024  Reason(s) for Admission: SOB    Was your hospitalization related to COVID-19? No   Problems taking medications regularly:  None  Medication changes since discharge: Albuterol and prednisone  Problems adhering to non-medication therapy:  None    Summary of hospitalization:  [facility] discharge summary reviewed  Diagnostic Tests/Treatments reviewed.  Follow up needed: PCP follow-up   Other Healthcare Providers Involved in Patient s Care:         None  Update since discharge: improved.     Plan of care communicated with patient     Has been referred to pulmonology but symptoms improved.  Hx of allergy induced asthma.  Was taking Symbicort but stopped.  Did work while she was taking.        1/30/2024     6:54 AM   ACT Total Scores   ACT TOTAL SCORE (Goal Greater than or Equal to 20) 8   In the past 12 months, how many times did you visit the emergency room for your asthma without being admitted to the  hospital? 2   In the past 12 months, how many times were you hospitalized overnight because of your asthma? 0           Objective           Vitals:  No vitals were obtained today due to virtual visit.    Physical Exam   GENERAL: alert and no distress  EYES: Eyes grossly normal to inspection.  No discharge or erythema, or obvious scleral/conjunctival abnormalities.  RESP: No audible wheeze, cough, or visible cyanosis.    SKIN: Visible skin clear. No significant rash, abnormal pigmentation or lesions.  NEURO: Cranial nerves grossly intact.  Mentation and speech appropriate for age.  PSYCH: Appropriate affect, tone, and pace of words        Video-Visit Details    Type of service:  Video Visit     Originating Location (pt. Location): Home    Distant Location (provider location):  On-site  Platform used for Video Visit: Carlos  Signed Electronically by: Isadora Cobb NP

## 2024-02-08 SDOH — HEALTH STABILITY: PHYSICAL HEALTH: ON AVERAGE, HOW MANY MINUTES DO YOU ENGAGE IN EXERCISE AT THIS LEVEL?: 0 MIN

## 2024-02-08 SDOH — HEALTH STABILITY: PHYSICAL HEALTH: ON AVERAGE, HOW MANY DAYS PER WEEK DO YOU ENGAGE IN MODERATE TO STRENUOUS EXERCISE (LIKE A BRISK WALK)?: 0 DAYS

## 2024-02-08 ASSESSMENT — SOCIAL DETERMINANTS OF HEALTH (SDOH): HOW OFTEN DO YOU GET TOGETHER WITH FRIENDS OR RELATIVES?: THREE TIMES A WEEK

## 2024-02-14 ENCOUNTER — OFFICE VISIT (OUTPATIENT)
Dept: PEDIATRICS | Facility: CLINIC | Age: 44
End: 2024-02-14
Payer: COMMERCIAL

## 2024-02-14 VITALS
TEMPERATURE: 97.6 F | OXYGEN SATURATION: 99 % | DIASTOLIC BLOOD PRESSURE: 60 MMHG | HEART RATE: 64 BPM | BODY MASS INDEX: 35.62 KG/M2 | SYSTOLIC BLOOD PRESSURE: 118 MMHG | HEIGHT: 66 IN | WEIGHT: 221.6 LBS | RESPIRATION RATE: 16 BRPM

## 2024-02-14 DIAGNOSIS — Z23 NEED FOR VACCINATION: ICD-10-CM

## 2024-02-14 DIAGNOSIS — Z12.4 CERVICAL CANCER SCREENING: ICD-10-CM

## 2024-02-14 DIAGNOSIS — Z11.4 SCREENING FOR HIV (HUMAN IMMUNODEFICIENCY VIRUS): ICD-10-CM

## 2024-02-14 DIAGNOSIS — B97.7 HIGH RISK HPV INFECTION: ICD-10-CM

## 2024-02-14 DIAGNOSIS — J45.41 MODERATE PERSISTENT ASTHMA WITH ACUTE EXACERBATION: ICD-10-CM

## 2024-02-14 DIAGNOSIS — Z83.3 FAMILY HISTORY OF DIABETES MELLITUS: ICD-10-CM

## 2024-02-14 DIAGNOSIS — K21.9 GASTROESOPHAGEAL REFLUX DISEASE WITHOUT ESOPHAGITIS: ICD-10-CM

## 2024-02-14 DIAGNOSIS — Z11.3 ROUTINE SCREENING FOR STI (SEXUALLY TRANSMITTED INFECTION): ICD-10-CM

## 2024-02-14 DIAGNOSIS — Z12.11 SCREEN FOR COLON CANCER: ICD-10-CM

## 2024-02-14 DIAGNOSIS — Z00.00 ROUTINE GENERAL MEDICAL EXAMINATION AT A HEALTH CARE FACILITY: Primary | ICD-10-CM

## 2024-02-14 DIAGNOSIS — Z13.220 SCREENING CHOLESTEROL LEVEL: ICD-10-CM

## 2024-02-14 DIAGNOSIS — F41.1 GENERALIZED ANXIETY DISORDER: ICD-10-CM

## 2024-02-14 DIAGNOSIS — Z11.59 NEED FOR HEPATITIS C SCREENING TEST: ICD-10-CM

## 2024-02-14 DIAGNOSIS — Z83.719 FAMILY HISTORY OF COLONIC POLYPS: ICD-10-CM

## 2024-02-14 LAB
C TRACH DNA SPEC QL NAA+PROBE: NEGATIVE
CHOLEST SERPL-MCNC: 187 MG/DL
FASTING STATUS PATIENT QL REPORTED: YES
HBA1C MFR BLD: 5.9 % (ref 0–5.6)
HDLC SERPL-MCNC: 95 MG/DL
HIV 1+2 AB+HIV1 P24 AG SERPL QL IA: NONREACTIVE
LDLC SERPL CALC-MCNC: 82 MG/DL
N GONORRHOEA DNA SPEC QL NAA+PROBE: NEGATIVE
NONHDLC SERPL-MCNC: 92 MG/DL
TRIGL SERPL-MCNC: 52 MG/DL

## 2024-02-14 PROCEDURE — 91320 SARSCV2 VAC 30MCG TRS-SUC IM: CPT | Performed by: NURSE PRACTITIONER

## 2024-02-14 PROCEDURE — 86803 HEPATITIS C AB TEST: CPT | Performed by: NURSE PRACTITIONER

## 2024-02-14 PROCEDURE — 99396 PREV VISIT EST AGE 40-64: CPT | Mod: 25 | Performed by: NURSE PRACTITIONER

## 2024-02-14 PROCEDURE — 36415 COLL VENOUS BLD VENIPUNCTURE: CPT | Performed by: NURSE PRACTITIONER

## 2024-02-14 PROCEDURE — 90480 ADMN SARSCOV2 VAC 1/ONLY CMP: CPT | Performed by: NURSE PRACTITIONER

## 2024-02-14 PROCEDURE — 90677 PCV20 VACCINE IM: CPT | Performed by: NURSE PRACTITIONER

## 2024-02-14 PROCEDURE — 80061 LIPID PANEL: CPT | Performed by: NURSE PRACTITIONER

## 2024-02-14 PROCEDURE — G0145 SCR C/V CYTO,THINLAYER,RESCR: HCPCS | Performed by: NURSE PRACTITIONER

## 2024-02-14 PROCEDURE — 87389 HIV-1 AG W/HIV-1&-2 AB AG IA: CPT | Performed by: NURSE PRACTITIONER

## 2024-02-14 PROCEDURE — 83036 HEMOGLOBIN GLYCOSYLATED A1C: CPT | Performed by: NURSE PRACTITIONER

## 2024-02-14 PROCEDURE — 99214 OFFICE O/P EST MOD 30 MIN: CPT | Mod: 25 | Performed by: NURSE PRACTITIONER

## 2024-02-14 PROCEDURE — 87491 CHLMYD TRACH DNA AMP PROBE: CPT | Performed by: NURSE PRACTITIONER

## 2024-02-14 PROCEDURE — 90471 IMMUNIZATION ADMIN: CPT | Performed by: NURSE PRACTITIONER

## 2024-02-14 PROCEDURE — 87624 HPV HI-RISK TYP POOLED RSLT: CPT | Performed by: NURSE PRACTITIONER

## 2024-02-14 PROCEDURE — 87591 N.GONORRHOEAE DNA AMP PROB: CPT | Performed by: NURSE PRACTITIONER

## 2024-02-14 RX ORDER — CITALOPRAM HYDROBROMIDE 20 MG/1
20 TABLET ORAL DAILY
Qty: 90 TABLET | Refills: 3 | Status: SHIPPED | OUTPATIENT
Start: 2024-02-14

## 2024-02-14 RX ORDER — BUDESONIDE AND FORMOTEROL FUMARATE DIHYDRATE 160; 4.5 UG/1; UG/1
2 AEROSOL RESPIRATORY (INHALATION) 2 TIMES DAILY
Qty: 10.2 G | Refills: 3 | Status: SHIPPED | OUTPATIENT
Start: 2024-02-14 | End: 2024-06-28

## 2024-02-14 ASSESSMENT — PAIN SCALES - GENERAL: PAINLEVEL: NO PAIN (0)

## 2024-02-14 ASSESSMENT — ASTHMA QUESTIONNAIRES: ACT_TOTALSCORE: 14

## 2024-02-14 NOTE — PATIENT INSTRUCTIONS
"Learning About Being Physically Active  What is physical activity?     Being physically active means doing any kind of activity that gets your body moving.  The types of physical activity that can help you get fit and stay healthy include:  Aerobic or \"cardio\" activities. These make your heart beat faster and make you breathe harder, such as brisk walking, riding a bike, or running. They strengthen your heart and lungs and build up your endurance.  Strength training activities. These make your muscles work against, or \"resist,\" something. Examples include lifting weights or doing push-ups. These activities help tone and strengthen your muscles and bones.  Stretches. These let you move your joints and muscles through their full range of motion. Stretching helps you be more flexible.  Reaching a balance between these three types of physical activity is important because each one contributes to your overall fitness.  What are the benefits of being active?  Being active is one of the best things you can do for your health. It helps you to:  Feel stronger and have more energy to do all the things you like to do.  Focus better at school or work.  Feel, think, and sleep better.  Reach and stay at a healthy weight.  Lose fat and build lean muscle.  Lower your risk for serious health problems, including diabetes, heart attack, high blood pressure, and some cancers.  Keep your heart, lungs, bones, muscles, and joints strong and healthy.  How can you make being active part of your life?  Start slowly. Make it your long-term goal to get at least 30 minutes of exercise on most days of the week. Walking is a good choice. You also may want to do other activities, such as running, swimming, cycling, or playing tennis or team sports.  Pick activities that you like--ones that make your heart beat faster, your muscles stronger, and your muscles and joints more flexible. If you find more than one thing you like doing, do them all. You " "don't have to do the same thing every day.  Get your heart pumping every day. Any activity that makes your heart beat faster and keeps it at that rate for a while counts.  Here are some great ways to get your heart beating faster:  Go for a brisk walk, run, or hike.  Go for a swim or bike ride.  Take an online exercise class or dance.  Play a game of touch football, basketball, or soccer.  Play tennis, pickleball, or racquetball.  Climb stairs.  Even some household chores can be aerobic. Just do them at a faster pace. Raking or mowing the lawn, sweeping the garage, and vacuuming and cleaning your home all can help get your heart rate up.  Strengthen your muscles during the week. You don't have to lift heavy weights or grow big, bulky muscles to get stronger. Doing a few simple activities that make your muscles work against, or \"resist,\" something can help you get stronger. Aim for at least twice a week.  For example, you can:  Do push-ups or sit-ups, which use your own body weight as resistance.  Lift weights or dumbbells or use stretch bands at home or in a gym or community center.  Stretch your muscles often. Stretching will help you as you become more active. It can help you stay flexible and loosen tight muscles. It can also help improve your balance and posture and can be a great way to relax.  Be sure to stretch the muscles you'll be using when you work out. It's best to warm your muscles slightly before you stretch them. Walk or do some other light aerobic activity for a few minutes. Then start stretching.  When you stretch your muscles:  Do it slowly. Stretching is not about going fast or making sudden movements.  Don't push or bounce during a stretch.  Hold each stretch for at least 15 to 30 seconds, if you can. You should feel a stretch in the muscle, but not pain.  Breathe out as you do the stretch. Then breathe in as you hold the stretch. Don't hold your breath.  If you're worried about how more activity " "might affect your health, have a checkup before you start. Follow any special advice your doctor gives you for getting a smart start.  Where can you learn more?  Go to https://www.[employer].net/patiented  Enter W332 in the search box to learn more about \"Learning About Being Physically Active.\"  Current as of: June 6, 2023               Content Version: 13.8    2303-9483 [employer].   Care instructions adapted under license by your healthcare professional. If you have questions about a medical condition or this instruction, always ask your healthcare professional. [employer] disclaims any warranty or liability for your use of this information.      Eating Healthy Foods: Care Instructions  With every meal, you can make healthy food choices. Try to eat a variety of fruits, vegetables, whole grains, lean proteins, and low-fat dairy products. This can help you get the right balance of nutrients, including vitamins and minerals. Small changes add up over time. You can start by adding one healthy food to your meals each day.    Try to make half your plate fruits and vegetables, one-fourth whole grains, and one-fourth lean proteins. Try including dairy with your meals.   Eat more fruits and vegetables. Try to have them with most meals and snacks.   Foods for healthy eating    Fruits    These can be fresh, frozen, canned, or dried.  Try to choose whole fruit rather than fruit juice.  Eat a variety of colors.    Vegetables    These can be fresh, frozen, canned, or dried.  Beans, peas, and lentils count too.    Whole grains    Choose whole-grain breads, cereals, and noodles.  Try brown rice.    Lean proteins    These can include lean meat, poultry, fish, and eggs.  You can also have tofu, beans, peas, lentils, nuts, and seeds.    Dairy    Try milk, yogurt, and cheese.  Choose low-fat or fat-free when you can.  If you need to, use lactose-free milk or fortified plant-based milk products, such as " "soy milk.    Water    Drink water when you're thirsty.  Limit sugar-sweetened drinks, including soda, fruit drinks, and sports drinks.  Where can you learn more?  Go to https://www.[employer].net/patiented  Enter T756 in the search box to learn more about \"Eating Healthy Foods: Care Instructions.\"  Current as of: February 28, 2023               Content Version: 13.8    7949-2827 [employer].   Care instructions adapted under license by your healthcare professional. If you have questions about a medical condition or this instruction, always ask your healthcare professional. [employer] disclaims any warranty or liability for your use of this information.      Learning About Stress  What is stress?     Stress is your body's response to a hard situation. Your body can have a physical, emotional, or mental response. Stress is a fact of life for most people, and it affects everyone differently. What causes stress for you may not be stressful for someone else.  A lot of things can cause stress. You may feel stress when you go on a job interview, take a test, or run a race. This kind of short-term stress is normal and even useful. It can help you if you need to work hard or react quickly. For example, stress can help you finish an important job on time.  Long-term stress is caused by ongoing stressful situations or events. Examples of long-term stress include long-term health problems, ongoing problems at work, or conflicts in your family. Long-term stress can harm your health.  How does stress affect your health?  When you are stressed, your body responds as though you are in danger. It makes hormones that speed up your heart, make you breathe faster, and give you a burst of energy. This is called the fight-or-flight stress response. If the stress is over quickly, your body goes back to normal and no harm is done.  But if stress happens too often or lasts too long, it can have bad effects. " Long-term stress can make you more likely to get sick, and it can make symptoms of some diseases worse. If you tense up when you are stressed, you may develop neck, shoulder, or low back pain. Stress is linked to high blood pressure and heart disease.  Stress also harms your emotional health. It can make you cullen, tense, or depressed. Your relationships may suffer, and you may not do well at work or school.  What can you do to manage stress?  You can try these things to help manage stress:   Do something active. Exercise or activity can help reduce stress. Walking is a great way to get started. Even everyday activities such as housecleaning or yard work can help.  Try yoga or hank chi. These techniques combine exercise and meditation. You may need some training at first to learn them.  Do something you enjoy. For example, listen to music or go to a movie. Practice your hobby or do volunteer work.  Meditate. This can help you relax, because you are not worrying about what happened before or what may happen in the future.  Do guided imagery. Imagine yourself in any setting that helps you feel calm. You can use online videos, books, or a teacher to guide you.  Do breathing exercises. For example:  From a standing position, bend forward from the waist with your knees slightly bent. Let your arms dangle close to the floor.  Breathe in slowly and deeply as you return to a standing position. Roll up slowly and lift your head last.  Hold your breath for just a few seconds in the standing position.  Breathe out slowly and bend forward from the waist.  Let your feelings out. Talk, laugh, cry, and express anger when you need to. Talking with supportive friends or family, a counselor, or a rogelio leader about your feelings is a healthy way to relieve stress. Avoid discussing your feelings with people who make you feel worse.  Write. It may help to write about things that are bothering you. This helps you find out how much stress  "you feel and what is causing it. When you know this, you can find better ways to cope.  What can you do to prevent stress?  You might try some of these things to help prevent stress:  Manage your time. This helps you find time to do the things you want and need to do.  Get enough sleep. Your body recovers from the stresses of the day while you are sleeping.  Get support. Your family, friends, and community can make a difference in how you experience stress.  Limit your news feed. Avoid or limit time on social media or news that may make you feel stressed.  Do something active. Exercise or activity can help reduce stress. Walking is a great way to get started.  Where can you learn more?  Go to https://www.[employer].net/patiented  Enter N032 in the search box to learn more about \"Learning About Stress.\"  Current as of: February 26, 2023               Content Version: 13.8    6253-2238 [employer].   Care instructions adapted under license by your healthcare professional. If you have questions about a medical condition or this instruction, always ask your healthcare professional. [employer] disclaims any warranty or liability for your use of this information.      Safer Sex: Care Instructions  Overview  Safer sex is a way to reduce your risk of getting a sexually transmitted infection (STI). It can also help prevent pregnancy.  Several products can help you practice safer sex and reduce your chance of STIs. One of the best is a condom. There are internal and external condoms. You can use a special rubber sheet (dental dam) for protection during oral sex. Disposable gloves can keep your hands from touching blood, semen, or other body fluids that can carry infections.  Remember that birth control methods such as diaphragms, IUDs, foams, and birth control pills do not stop you from getting STIs.  Follow-up care is a key part of your treatment and safety. Be sure to make and go to all " "appointments, and call your doctor if you are having problems. It's also a good idea to know your test results and keep a list of the medicines you take.  How can you care for yourself at home?  Think about getting vaccinated to help prevent hepatitis A, hepatitis B, and human papillomavirus (HPV). They can be spread through sex.  Use a condom every time you have sex. Use an external condom, which goes on the penis. Or use an internal condom, which goes into the vagina or anus.  Make sure you use the right size external condom. A condom that's too small can break easily. A condom that's too big can slip off during sex.  Use a new condom each time you have sex. Be careful not to poke a hole in the condom when you open the wrapper.  Don't use an internal condom and an external condom at the same time.  Never use petroleum jelly (such as Vaseline), grease, hand lotion, baby oil, or anything with oil in it. These products can make holes in the condom.  After intercourse, hold the edge of the condom as you remove it. This will help keep semen from spilling out of the condom.  Do not have sex with anyone who has symptoms of an STI, such as sores on the genitals or mouth.  Do not drink a lot of alcohol or use drugs before sex.  Limit your sex partners. Sex with one partner who has sex only with you can reduce your risk of getting an STI.  Don't share sex toys. But if you do share them, use a condom and clean the sex toys between each use.  Talk to your partner(s) before you have sex. Talk about what you feel comfortable with and whether you have any boundaries with sex. And find out if your partner(s) may be at risk for any STI. Keep in mind that a person may be able to spread an STI even if they do not have symptoms. You and your partner(s) may want to get tested for STIs.  Where can you learn more?  Go to https://www.healthwise.net/patiented  Enter B608 in the search box to learn more about \"Safer Sex: Care " "Instructions.\"  Current as of: April 19, 2023               Content Version: 13.8    2789-8964 [employer].   Care instructions adapted under license by your healthcare professional. If you have questions about a medical condition or this instruction, always ask your healthcare professional. [employer] disclaims any warranty or liability for your use of this information.      Preventive Care Advice   This is general advice given by our system to help you stay healthy. However, your care team may have specific advice just for you. Please talk to your care team about your preventive care needs.  Nutrition  Eat 5 or more servings of fruits and vegetables each day.  Try wheat bread, brown rice and whole grain pasta (instead of white bread, rice, and pasta).  Get enough calcium and vitamin D. Check the label on foods and aim for 100% of the RDA (recommended daily allowance).  Lifestyle  Exercise at least 150 minutes each week  (30 minutes a day, 5 days a week).  Do muscle strengthening activities 2 days a week. These help control your weight and prevent disease.  No smoking.  Wear sunscreen to prevent skin cancer.  Have a dental exam and cleaning every 6 months.  Yearly exams  See your health care team every year to talk about:  Any changes in your health.  Any medicines your care team has prescribed.  Preventive care, family planning, and ways to prevent chronic diseases.  Shots (vaccines)   HPV shots (up to age 26), if you've never had them before.  Hepatitis B shots (up to age 59), if you've never had them before.  COVID-19 shot: Get this shot when it's due.  Flu shot: Get a flu shot every year.  Tetanus shot: Get a tetanus shot every 10 years.  Pneumococcal, hepatitis A, and RSV shots: Ask your care team if you need these based on your risk.  Shingles shot (for age 50 and up)  General health tests  Diabetes screening:  Starting at age 35, Get screened for diabetes at least every 3 " years.  If you are younger than age 35, ask your care team if you should be screened for diabetes.  Cholesterol test: At age 39, start having a cholesterol test every 5 years, or more often if advised.  Bone density scan (DEXA): At age 50, ask your care team if you should have this scan for osteoporosis (brittle bones).  Hepatitis C: Get tested at least once in your life.  STIs (sexually transmitted infections)  Before age 24: Ask your care team if you should be screened for STIs.  After age 24: Get screened for STIs if you're at risk. You are at risk for STIs (including HIV) if:  You are sexually active with more than one person.  You don't use condoms every time.  You or a partner was diagnosed with a sexually transmitted infection.  If you are at risk for HIV, ask about PrEP medicine to prevent HIV.  Get tested for HIV at least once in your life, whether you are at risk for HIV or not.  Cancer screening tests  Cervical cancer screening: If you have a cervix, begin getting regular cervical cancer screening tests starting at age 21.  Breast cancer scan (mammogram): If you've ever had breasts, begin having regular mammograms starting at age 40. This is a scan to check for breast cancer.  Colon cancer screening: It is important to start screening for colon cancer at age 45.  Have a colonoscopy test every 10 years (or more often if you're at risk) Or, ask your provider about stool tests like a FIT test every year or Cologuard test every 3 years.  To learn more about your testing options, visit:   [email or web address].  For help making a decision, visit:   https://bit.ly/ts07912.  Prostate cancer screening test: If you have a prostate, ask your care team if a prostate cancer screening test (PSA) at age 55 is right for you.  Lung cancer screening: If you are a current or former smoker ages 50 to 80, ask your care team if ongoing lung cancer screenings are right for you.  For informational purposes only.  Not to replace the advice of your health care provider. Copyright   2023 [facility]. All rights reserved. Clinically reviewed by the [facility] Transitions Program. [employer] [id number] - REV 01/24.

## 2024-02-14 NOTE — PROGRESS NOTES
Preventive Care Visit  [facility] ABDIFATAH Cobb NP, Family Medicine  Feb 14, 2024      Assessment & Plan     Routine general medical examination at a health care facility  Routine exam performed today. Age appropriate screening and preventative care have been addressed today.   Vaccinations have been updated per patient request. Recommend annual vision exams as well as biannual dental exams. They will follow up for annual physical again in one year.      Moderate persistent asthma with acute exacerbation  Improving per patient.  ACT 14.  Continue with Symbicort daily.  Keep follow-up appointment with pulmonology.  - budesonide-formoterol (SYMBICORT) 160-4.5 MCG/ACT Inhaler; Inhale 2 puffs into the lungs 2 times daily    Gastroesophageal reflux disease without esophagitis  Stable.  Continue with omeprazole 20mg daily.    Generalized anxiety disorder  Stable.  Continue with citalopram 20mg daily.  - citalopram (CELEXA) 20 MG tablet; Take 1 tablet (20 mg) by mouth daily    Cervical cancer screening  High risk HPV infection  Repeat pap done.  12/7/22 NIL Pap, + HR HPV (not 16 or 18) Plan cotest in 1 year due 12/7/23.  - Pap Screen with HPV - recommended age 30 - 65 years      Routine screening for STI (sexually transmitted infection)  No S&S.  Routine screening requested.  - NEISSERIA GONORRHOEA PCR  - CHLAMYDIA TRACHOMATIS PCR    Screening for HIV (human immunodeficiency virus)  - HIV Antigen Antibody Combo    Need for hepatitis C screening test  - Hepatitis C Screen Reflex to HCV RNA Quant and Genotype    Screen for colon cancer  - Colonoscopy Screening  Referral; Future    Family history of colonic polyps  - Colonoscopy Screening  Referral; Future    Screening cholesterol level  - Lipid panel reflex to direct LDL Fasting    Family history of diabetes mellitus  - Hemoglobin A1c    Need for vaccination  - Pneumococcal 20 Valent Conjugate (Prevnar 20)  - COVID-19 12+ (2023-24)  "(PFIZER)      BMI  Estimated body mass index is 35.4 kg/m  as calculated from the following:    Height as of this encounter: 1.685 m (5' 6.34\").    Weight as of this encounter: 100.5 kg (221 lb 9.6 oz).   Weight management plan: Discussed healthy diet and exercise guidelines    Counseling  Appropriate preventive services were discussed with this patient, including applicable screening as appropriate for fall prevention, nutrition, physical activity, Tobacco-use cessation, weight loss and cognition.  Checklist reviewing preventive services available has been given to the patient.  Reviewed patient's diet, addressing concerns and/or questions.   She is at risk for psychosocial distress and has been provided with information to reduce risk.       Scar Bray is a 44 year old, presenting for the following:    Physical        2/14/2024     8:04 AM   Additional Questions   Roomed by Maame Riddle   Accompanied by JANINA        Health Care Directive  Patient does not have a Health Care Directive or Living Will: Discussed advance care planning with patient; however, patient declined at this time.    HPI        2/8/2024   General Health   How would you rate your overall physical health? Good   Feel stress (tense, anxious, or unable to sleep) Only a little   (!) STRESS CONCERN  Normal daily stress.        2/8/2024   Nutrition   Three or more servings of calcium each day? Yes   Diet: Regular (no restrictions)   How many servings of fruit and vegetables per day? (!) 0-1   How many sweetened beverages each day? 0-1         2/8/2024   Exercise   Days per week of moderate/strenous exercise 0 days   Average minutes spent exercising at this level 0 min   (!) EXERCISE CONCERN  None        2/8/2024   Social Factors   Frequency of gathering with friends or relatives Three times a week   Worry food won't last until get money to buy more No   Food not last or not have enough money for food? No   Do you have housing?  Yes   Are you " worried about losing your housing? No   Lack of transportation? No   Unable to get utilities (heat,electricity)? No         2024   Dental   Dentist two times every year? Yes         2024   TB Screening   Were you born outside of [place]?  No         Today's PHQ-9 Score:       10/11/2023    11:13 AM   PHQ-9 SCORE   PHQ-9 Total Score MyChart 4 (Minimal depression)   PHQ-9 Total Score 4           2024   Substance Use   Alcohol more than 3/day or more than 7/wk No   Do you use any other substances recreationally? No     Social History     Tobacco Use    Smoking status: Former     Packs/day: 0.50     Years: 10.00     Additional pack years: 0.00     Total pack years: 5.00     Types: Cigarettes     Start date: 9/3/2014     Quit date: 10/1/2021     Years since quittin.3     Passive exposure: Past    Smokeless tobacco: Never    Tobacco comments:     no passive exposure   Vaping Use    Vaping Use: Never used   Substance Use Topics    Alcohol use: Yes     Comment: Alcoholic Drinks/day: weekends    Drug use: No           2021   LAST FHS-7 RESULTS   1st degree relative breast or ovarian cancer No   Any relative bilateral breast cancer Unknown   Any male have breast cancer No   Any woman have breast and ovarian cancer No   Any woman with breast cancer before 50yrs Yes--Maternal aunt 32 years of age.  Genetic testing done by mother and sisters and negative.   2 or more relatives with breast and/or ovarian cancer Maternal aunts x 3 (32, 50, 55)   2 or more relatives with breast and/or bowel cancer Yes     Mammogram Screening - Mammogram every 1-2 years updated in Health Maintenance based on mutual decision making  2023 BIRADs 1.         2024   One time HIV Screening   Previous HIV test? Yes         2024   STI Screening   New sexual partner(s) since last STI/HIV test? (!) YES        History of abnormal Pap smear: Yes; 22 NIL Pap, + HR HPV (not 16 or 18) Plan cotest in 1 year due 23.          Latest Ref Rng & Units 12/7/2022     5:28 PM 8/23/2017     8:36 AM 9/19/2002    12:00 AM   PAP / HPV   PAP  Negative for Intraepithelial Lesion or Malignancy (NILM)  Negative for squamous intraepithelial lesion or malignancy  Electronically signed by Melanie Smith CT (ASCP) on 8/31/2017 at 11:08 AM       PAP (Historical)    WNL    HPV 16 DNA Negative Negative      HPV 18 DNA Negative Negative      Other HR HPV Negative Positive        The 10-year ASCVD risk score (Naty WOODARD, et al., 2019) is: 0.3%    Values used to calculate the score:      Age: 44 years      Sex: Female      Is Non- : No      Diabetic: No      Tobacco smoker: No      Systolic Blood Pressure: 118 mmHg      Is BP treated: No      HDL Cholesterol: 93 mg/dL      Total Cholesterol: 178 mg/dL        2/8/2024   Contraception/Family Planning   Questions about contraception or family planning No     Depression/Anxiety:  -Takes citalopram 20mg daily.  -Feels stable on dose    GERD:  -Takes omeprazole 20mg daily.  Stable.  Sometimes has to take an additional dose.    Asthma:  -Increased Symbicort 1/2024 to 160mcg.  -Feels much more controlled.  Has not used her albuterol inhaler or nebulizer  -Takes cetrizine 10mg daily.  -Pulmonologist consult 3/21/2024        1/30/2024     6:54 AM 2/14/2024     8:57 AM   ACT Total Scores   ACT TOTAL SCORE (Goal Greater than or Equal to 20) 8 14   In the past 12 months, how many times did you visit the emergency room for your asthma without being admitted to the hospital? 2 2   In the past 12 months, how many times were you hospitalized overnight because of your asthma? 0 0       Colon cancer screening: colonoscopy 2012 due to family history of polyps in father.  Due for repeat.       Reviewed and updated as needed this visit by Provider    Allergies  Meds              Review of Systems  Constitutional, HEENT, cardiovascular, pulmonary, gi and gu systems are negative, except as otherwise  "noted.     Objective    Exam  /60 (BP Location: Right arm, Patient Position: Sitting, Cuff Size: Adult Large)   Pulse 64   Temp 97.6  F (36.4  C) (Tympanic)   Resp 16   Ht 1.685 m (5' 6.34\")   Wt 100.5 kg (221 lb 9.6 oz)   SpO2 99%   BMI 35.40 kg/m     Estimated body mass index is 35.4 kg/m  as calculated from the following:    Height as of this encounter: 1.685 m (5' 6.34\").    Weight as of this encounter: 100.5 kg (221 lb 9.6 oz).    Physical Exam  GENERAL: alert and no distress  EYES: Eyes grossly normal to inspection, PERRL and conjunctivae and sclerae normal  HENT: ear canals and TM's normal, nose and mouth without ulcers or lesions  NECK: no adenopathy, no asymmetry, masses, or scars  RESP: lungs clear to auscultation - no rales, rhonchi or wheezes  CV: regular rate and rhythm, normal S1 S2, no S3 or S4, no murmur, click or rub, no peripheral edema  ABDOMEN: soft, nontender, no hepatosplenomegaly, no masses and bowel sounds normal  MS: no gross musculoskeletal defects noted, no edema  NEURO: Normal strength and tone, mentation intact and speech normal  PSYCH: mentation appears normal, affect normal/bright      Signed Electronically by: Isadora Cobb NP    "

## 2024-02-16 LAB — HCV AB SERPL QL IA: NONREACTIVE

## 2024-02-19 DIAGNOSIS — Z86.16 PERSONAL HISTORY OF COVID-19: ICD-10-CM

## 2024-02-19 DIAGNOSIS — R05.3 PERSISTENT COUGH FOR 3 WEEKS OR LONGER: ICD-10-CM

## 2024-02-19 DIAGNOSIS — K21.9 GASTROESOPHAGEAL REFLUX DISEASE WITHOUT ESOPHAGITIS: ICD-10-CM

## 2024-02-19 LAB
BKR LAB AP GYN ADEQUACY: NORMAL
BKR LAB AP GYN INTERPRETATION: NORMAL
BKR LAB AP HPV REFLEX: NORMAL
BKR LAB AP PREVIOUS ABNL DX: NORMAL
BKR LAB AP PREVIOUS ABNORMAL: NORMAL
PATH REPORT.COMMENTS IMP SPEC: NORMAL
PATH REPORT.COMMENTS IMP SPEC: NORMAL
PATH REPORT.RELEVANT HX SPEC: NORMAL

## 2024-02-22 ENCOUNTER — PATIENT OUTREACH (OUTPATIENT)
Dept: PEDIATRICS | Facility: CLINIC | Age: 44
End: 2024-02-22
Payer: COMMERCIAL

## 2024-02-22 PROBLEM — R87.810 CERVICAL HIGH RISK HPV (HUMAN PAPILLOMAVIRUS) TEST POSITIVE: Status: ACTIVE | Noted: 2022-12-14

## 2024-03-15 RX ORDER — ALBUTEROL SULFATE 0.83 MG/ML
2.5 SOLUTION RESPIRATORY (INHALATION) ONCE
Status: COMPLETED | OUTPATIENT
Start: 2024-03-18 | End: 2024-03-18

## 2024-03-18 ENCOUNTER — HOSPITAL ENCOUNTER (OUTPATIENT)
Dept: RESPIRATORY THERAPY | Facility: CLINIC | Age: 44
Discharge: HOME | End: 2024-03-18
Admitting: INTERNAL MEDICINE
Payer: COMMERCIAL

## 2024-03-18 DIAGNOSIS — R05.3 CHRONIC COUGH: Primary | ICD-10-CM

## 2024-03-18 LAB — HGB BLD-MCNC: 9.2 G/DL (ref 11.7–15.7)

## 2024-03-18 PROCEDURE — 250N000009 HC RX 250: Performed by: INTERNAL MEDICINE

## 2024-03-18 PROCEDURE — 85018 HEMOGLOBIN: CPT | Performed by: INTERNAL MEDICINE

## 2024-03-18 PROCEDURE — 36415 COLL VENOUS BLD VENIPUNCTURE: CPT | Performed by: INTERNAL MEDICINE

## 2024-03-18 PROCEDURE — 94060 EVALUATION OF WHEEZING: CPT

## 2024-03-18 PROCEDURE — 94729 DIFFUSING CAPACITY: CPT | Mod: 26 | Performed by: INTERNAL MEDICINE

## 2024-03-18 PROCEDURE — 94729 DIFFUSING CAPACITY: CPT

## 2024-03-18 PROCEDURE — 94726 PLETHYSMOGRAPHY LUNG VOLUMES: CPT

## 2024-03-18 PROCEDURE — 999N000157 HC STATISTIC RCP TIME EA 10 MIN

## 2024-03-18 PROCEDURE — 94060 EVALUATION OF WHEEZING: CPT | Mod: 26 | Performed by: INTERNAL MEDICINE

## 2024-03-18 PROCEDURE — 94726 PLETHYSMOGRAPHY LUNG VOLUMES: CPT | Mod: 26 | Performed by: INTERNAL MEDICINE

## 2024-03-18 RX ADMIN — ALBUTEROL SULFATE 2.5 MG: 2.5 SOLUTION RESPIRATORY (INHALATION) at 08:49

## 2024-03-18 NOTE — PROGRESS NOTES
RESPIRATORY CARE NOTE    Complete Pulmonary Function Test completed by patient.  Good patient effort and cooperation. Albuterol 2.5 mg neb given for bronchodilation.  The results of this test meet the ATS standards for acceptability and repeatability. PT returned to baseline and left in no distress.    Micki Cantu, RT  3/18/2024

## 2024-03-21 ENCOUNTER — OFFICE VISIT (OUTPATIENT)
Dept: PULMONOLOGY | Facility: CLINIC | Age: 44
End: 2024-03-21
Payer: COMMERCIAL

## 2024-03-21 VITALS
DIASTOLIC BLOOD PRESSURE: 62 MMHG | BODY MASS INDEX: 36.74 KG/M2 | SYSTOLIC BLOOD PRESSURE: 124 MMHG | OXYGEN SATURATION: 97 % | WEIGHT: 228.6 LBS | HEIGHT: 66 IN | HEART RATE: 80 BPM

## 2024-03-21 DIAGNOSIS — J45.41 MODERATE PERSISTENT REACTIVE AIRWAY DISEASE WITH ACUTE EXACERBATION: ICD-10-CM

## 2024-03-21 DIAGNOSIS — R05.3 CHRONIC COUGH: ICD-10-CM

## 2024-03-21 LAB
DLCOCOR-%PRED-PRE: 112 %
DLCOCOR-PRE: 25.33 ML/MIN/MMHG
DLCOUNC-%PRED-PRE: 95 %
DLCOUNC-PRE: 21.33 ML/MIN/MMHG
DLCOUNC-PRED: 22.43 ML/MIN/MMHG
ERV-%PRED-PRE: 71 %
ERV-PRE: 0.94 L
ERV-PRED: 1.3 L
EXPTIME-PRE: 6.77 SEC
FEF2575-%PRED-POST: 136 %
FEF2575-%PRED-PRE: 94 %
FEF2575-POST: 3.98 L/SEC
FEF2575-PRE: 2.78 L/SEC
FEF2575-PRED: 2.93 L/SEC
FEFMAX-%PRED-PRE: 103 %
FEFMAX-PRE: 7.52 L/SEC
FEFMAX-PRED: 7.28 L/SEC
FEV1-%PRED-PRE: 108 %
FEV1-PRE: 3.17 L
FEV1FEV6-PRE: 77 %
FEV1FEV6-PRED: 83 %
FEV1FVC-PRE: 77 %
FEV1FVC-PRED: 82 %
FEV1SVC-PRE: 82 %
FEV1SVC-PRED: 78 %
FIFMAX-PRE: 4.03 L/SEC
FRCPLETH-%PRED-PRE: 108 %
FRCPLETH-PRE: 3.09 L
FRCPLETH-PRED: 2.84 L
FVC-%PRED-PRE: 114 %
FVC-PRE: 4.11 L
FVC-PRED: 3.59 L
IC-%PRED-PRE: 112 %
IC-PRE: 2.93 L
IC-PRED: 2.61 L
RVPLETH-%PRED-PRE: 119 %
RVPLETH-PRE: 2.15 L
RVPLETH-PRED: 1.81 L
TLCPLETH-%PRED-PRE: 111 %
TLCPLETH-PRE: 6.02 L
TLCPLETH-PRED: 5.4 L
VA-%PRED-PRE: 107 %
VA-PRE: 5.69 L
VC-%PRED-PRE: 103 %
VC-PRE: 3.86 L
VC-PRED: 3.73 L

## 2024-03-21 PROCEDURE — 95012 NITRIC OXIDE EXP GAS DETER: CPT | Performed by: INTERNAL MEDICINE

## 2024-03-21 PROCEDURE — 99205 OFFICE O/P NEW HI 60 MIN: CPT | Mod: 25 | Performed by: INTERNAL MEDICINE
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
## 2024-03-21 NOTE — PATIENT INSTRUCTIONS
Please use your Symbicort inhaler twice a day every day whether or not you are short of breath, this is not a rescue inhaler so do not use this if you are acutely short of breath.  Please be sure to gargle your mouth after using your Symbicort inhaler.  Please use your Spiriva once a day every day whether or not you are short of breath, this is not a rescue inhaler so do not use this if you are acutely short of breath.  Please use your albuterol inhaler 2 puffs up to 6 times a day for rescue. If you are not short of breath, you do not need to use this.

## 2024-03-21 NOTE — PROGRESS NOTES
CCx:***    HPI:***    ROS:  Review of Systems - General ROS: {:017223}  Psychological ROS: {:236982}  Ophthalmic ROS: {:971824}  ENT ROS: {:948259}  Allergy and Immunology ROS: {:109063}  Hematological and Lymphatic ROS: {:432029}  Endocrine ROS: {:673202}  Respiratory ROS: {:493903}  Cardiovascular ROS: {:194909}  Gastrointestinal ROS: {:477422}  Genito-Urinary ROS: {:503583}  Musculoskeletal ROS: {:961003}  Neurological ROS: {:817213}  Dermatological ROS: {ros; skin:535222}    PMH:  ***    PSH:  ***    Allergies:  ***    Family HX:  Mother: ***  Father: ***  Sister:***  Brother:***  Grandmother:***  Grandfather:***    Social Hx:  Marital status: ***  Number of children: ***  Resides in a *** yr old ***, concern for mold.  Occupational history: ***   service: ***  Pets: ***  Smoking history: *** pack year history  Alcohol use: ***   Recreational drug use: ***  Hobbies: ***  Recent Travel: ***    Current Meds:  ***    Labs:  ***    Imaging studies:      Physical Exam:  There were no vitals taken for this visit.  [unfilled]  Physical Exam      Assessment and Plan:Katarina Mcclain is a 44 year old with a past medical history significant for *** who presents to clinic today for ***. The likely etiology of female symptoms includes; infectious--***; Inflammatory--***; malignant--***. For the present we would recommend;    ***  HCM:  Follow-up:      Camryn Fink  Pulmonary and Critical Care  [id number]

## 2024-03-22 ENCOUNTER — TELEPHONE (OUTPATIENT)
Dept: ALLERGY | Facility: CLINIC | Age: 44
End: 2024-03-22
Payer: COMMERCIAL

## 2024-03-22 DIAGNOSIS — F41.1 GENERALIZED ANXIETY DISORDER: ICD-10-CM

## 2024-03-22 NOTE — TELEPHONE ENCOUNTER
Refill request for the following medication(s):  Pending Prescriptions:                       Disp   Refills    citalopram (CELEXA) 20 MG tablet          90 tab*3            Sig: Take 1 tablet (20 mg) by mouth daily      Pharmacy:    [id number] - [facility] [address] AT [facility] [address]

## 2024-03-22 NOTE — TELEPHONE ENCOUNTER
This encounter is being sent to inform the clinic that this patient has a referral from Dr. Camryn Madison  for the diagnoses of Moderate persistent reactive airway disease with acute exacerbation   and has requested that this patient be seen within 1-2 weeks. Based on the availability of our provider(s), we are unable to accommodate this request.    Were all sites offered this patient?  Patient already scheduled at [place]    Does scheduling algorithm request to schedule next available?  Patient has has already been scheduled with Dr. Angi Baez on 7/29/2024.

## 2024-03-25 ENCOUNTER — OFFICE VISIT (OUTPATIENT)
Dept: OBGYN | Facility: CLINIC | Age: 44
End: 2024-03-25
Payer: COMMERCIAL

## 2024-03-25 VITALS — SYSTOLIC BLOOD PRESSURE: 120 MMHG | WEIGHT: 229.9 LBS | DIASTOLIC BLOOD PRESSURE: 84 MMHG | BODY MASS INDEX: 37.11 KG/M2

## 2024-03-25 DIAGNOSIS — R87.810 CERVICAL HIGH RISK HPV (HUMAN PAPILLOMAVIRUS) TEST POSITIVE: Primary | ICD-10-CM

## 2024-03-25 DIAGNOSIS — N84.1 CERVICAL POLYP: ICD-10-CM

## 2024-03-25 DIAGNOSIS — Z01.812 PRE-PROCEDURE LAB EXAM: ICD-10-CM

## 2024-03-25 LAB
HCG UR QL: NEGATIVE
INTERNAL QC OK POCT: NORMAL
POCT KIT EXPIRATION DATE: NORMAL
POCT KIT LOT NUMBER: NORMAL

## 2024-03-25 PROCEDURE — 57454 BX/CURETT OF CERVIX W/SCOPE: CPT | Performed by: STUDENT IN AN ORGANIZED HEALTH CARE EDUCATION/TRAINING PROGRAM

## 2024-03-25 PROCEDURE — 81025 URINE PREGNANCY TEST: CPT | Performed by: STUDENT IN AN ORGANIZED HEALTH CARE EDUCATION/TRAINING PROGRAM

## 2024-03-25 PROCEDURE — 88305 TISSUE EXAM BY PATHOLOGIST: CPT | Performed by: PATHOLOGY

## 2024-03-25 RX ORDER — CITALOPRAM HYDROBROMIDE 20 MG/1
20 TABLET ORAL DAILY
Qty: 90 TABLET | Refills: 3 | OUTPATIENT
Start: 2024-03-25

## 2024-03-25 NOTE — NURSING NOTE
"Chief Complaint   Patient presents with    Colposcopy     Pap 2024  NIL  POS HR Other        Initial /84 (BP Location: Left arm, Cuff Size: Adult Regular)   Wt 104.3 kg (229 lb 14.4 oz)   BMI 37.11 kg/m   Estimated body mass index is 37.11 kg/m  as calculated from the following:    Height as of 3/21/24: 1.676 m (5' 6\").    Weight as of this encounter: 104.3 kg (229 lb 14.4 oz).  BP completed using cuff size: regular    Questioned patient about current smoking habits.  Pt. quit smoking some time ago.          The following HM Due: NONE      Flower Wells CMA on 3/25/2024 at 10:13 AM              "

## 2024-03-25 NOTE — PROGRESS NOTES
[facility]  Colposcopy Procedure    S:   Katarina Mcclain is a 44 year old  presenting for colposcopy for abnormal pap smear with NILM HPV OHR+ (see detailed history below). Today, she is feeling well.    - LMP No LMP recorded. Patient has had an implant.   - Abnormal bleeding: Yes with Nexplanon  - Sexually active: Yes  - Post-coital bleeding: Yes  - Contraception: Nexplanon  - Planning pregnancy: No  - Vaginal discharge/odor/irritation: No  - Smoking status: non-smoker  - HPV vaccine: No  - Prior colposcopies or excisional treatment: No  - Menopausal Symptoms: No  - Immunosuppression: No  - Hormonal Therapy: No    Cervical Cancer Screening History:  22 NIL Pap, + HR HPV (not 16 or 18) Plan cotest in 1 year due 23 NIL Pap, + HR HPV (neg 16/18). [place] due by 24    O:   Patient Vitals for the past 24 hrs:   BP Weight   24 1010 120/84 104.3 kg (229 lb 14.4 oz)       GEN:   Alert, resting comfortably, NAD  CV:   Normal rate  PULM:   Non-labored breathing on RA  ABD:   Soft, non-tender  :  External genitalia without lesions. Vaginal mucosa without lesions. Small amount of bloody discharge in vaginal vault. Nickel sized cervical polyp seen. Cervix otherwise appears normal, no gross lesions. Small <1 mm whitish area seen grossly at ~4 o'clock about 1 cm from os likely nabothian cyst. The SCJ is not seen in entirety. The TZ is not seen in entirety. Acetowhite changes: Vague thin changes around cervical os. Abnormal vessels: No. Areas of non-staining with Lugol's: Not performed. Biopsies and ECC taken as below. Cervical polypectomy also performed.     Labs:   UPT negative today in clinic    PROCEDURE: Colposcopy + Polypectomy  Prior to starting the procedure was explained to the patient and informed consent was obtained. The patient's identity was confirmed with 2 patient identifiers. She was placed in dorsal lithotomy position and the speculum placed in vagina. The cervix was  visualized. The polyp was grasped with ring forceps and removed in a twisting fashion. This was performed 3 times to remove more proximal portions of the polyp. The transformation zone was not seen in entirety. Acetic acid was applied to the cervix. Mild and thin acetowhite changes were noted under colposcopy around the os (vs columnar epithelium) as above. Lugol's solution was not applied. Biopsies were taken at the 4 and 7 o'clock positions at the cervical os and the 4 o'clock position about 1 cm from the os at the possible nabothian cyst though scant tissue was recovered.  An endocervical curette was inserted into the cervical os and a sample obtained.  An endocervical brush was then used to obtain any remaining tissue freed in the endocervical canal.  Bleeding was controlled with pressure and silver nitrite.  Excellent hemostasis was noted.  The speculum was removed from the vagina.  The patient tolerated the procedure well. EBL 3 mL. The procedure was chaperoned by Flower Wells MA.    A/P:   44 year old  presenting for colposcopy for abnormal pap smear.    #Abnormal pap smear (NILM HPV OHR+)  - Colposcopy inadequate as entire TZ not seen.   - Colposcopic impression - NEVIN 1  - Follow-up per ASCCP guidelines  - Will contact patient with results    #Cervical Polyp  - s/p removal  - Await pathology results  - This is likely cause of postcoital bleeding    #Unvaccinated against HPV  - She would like to start vaccination series. Plans to follow-up with her PCP    Dragan Marrero MD [facility]  2024 10:47 AM

## 2024-03-27 LAB
PATH REPORT.COMMENTS IMP SPEC: NORMAL
PATH REPORT.COMMENTS IMP SPEC: NORMAL
PATH REPORT.FINAL DX SPEC: NORMAL
PATH REPORT.GROSS SPEC: NORMAL
PATH REPORT.MICROSCOPIC SPEC OTHER STN: NORMAL
PATH REPORT.RELEVANT HX SPEC: NORMAL
PHOTO IMAGE: NORMAL

## 2024-03-29 ENCOUNTER — PATIENT OUTREACH (OUTPATIENT)
Dept: OBGYN | Facility: CLINIC | Age: 44
End: 2024-03-29
Payer: COMMERCIAL

## 2024-04-02 ENCOUNTER — OFFICE VISIT (OUTPATIENT)
Dept: ALLERGY | Facility: CLINIC | Age: 44
End: 2024-04-02
Payer: COMMERCIAL

## 2024-04-02 ENCOUNTER — LAB (OUTPATIENT)
Dept: LAB | Facility: CLINIC | Age: 44
End: 2024-04-02
Payer: COMMERCIAL

## 2024-04-02 VITALS — WEIGHT: 225 LBS | BODY MASS INDEX: 36.32 KG/M2 | OXYGEN SATURATION: 98 % | HEART RATE: 71 BPM

## 2024-04-02 DIAGNOSIS — J30.89 SEASONAL ALLERGIC RHINITIS DUE TO OTHER ALLERGIC TRIGGER: Primary | ICD-10-CM

## 2024-04-02 DIAGNOSIS — J45.41 MODERATE PERSISTENT REACTIVE AIRWAY DISEASE WITH ACUTE EXACERBATION: ICD-10-CM

## 2024-04-02 DIAGNOSIS — J30.89 SEASONAL ALLERGIC RHINITIS DUE TO OTHER ALLERGIC TRIGGER: ICD-10-CM

## 2024-04-02 LAB
BASOPHILS # BLD AUTO: 0 10E3/UL (ref 0–0.2)
BASOPHILS NFR BLD AUTO: 1 %
EOSINOPHIL # BLD AUTO: 0.6 10E3/UL (ref 0–0.7)
EOSINOPHIL NFR BLD AUTO: 7 %
ERYTHROCYTE [DISTWIDTH] IN BLOOD BY AUTOMATED COUNT: 18.4 % (ref 10–15)
HCT VFR BLD AUTO: 31.1 % (ref 35–47)
HGB BLD-MCNC: 9.3 G/DL (ref 11.7–15.7)
IMM GRANULOCYTES # BLD: 0 10E3/UL
IMM GRANULOCYTES NFR BLD: 0 %
LYMPHOCYTES # BLD AUTO: 3 10E3/UL (ref 0.8–5.3)
LYMPHOCYTES NFR BLD AUTO: 36 %
MCH RBC QN AUTO: 22.1 PG (ref 26.5–33)
MCHC RBC AUTO-ENTMCNC: 29.9 G/DL (ref 31.5–36.5)
MCV RBC AUTO: 74 FL (ref 78–100)
MONOCYTES # BLD AUTO: 0.8 10E3/UL (ref 0–1.3)
MONOCYTES NFR BLD AUTO: 10 %
NEUTROPHILS # BLD AUTO: 3.8 10E3/UL (ref 1.6–8.3)
NEUTROPHILS NFR BLD AUTO: 47 %
PLATELET # BLD AUTO: 343 10E3/UL (ref 150–450)
RBC # BLD AUTO: 4.21 10E6/UL (ref 3.8–5.2)
WBC # BLD AUTO: 8.2 10E3/UL (ref 4–11)

## 2024-04-02 PROCEDURE — 95004 PERQ TESTS W/ALRGNC XTRCS: CPT | Performed by: ALLERGY & IMMUNOLOGY

## 2024-04-02 PROCEDURE — 82785 ASSAY OF IGE: CPT

## 2024-04-02 PROCEDURE — 36415 COLL VENOUS BLD VENIPUNCTURE: CPT

## 2024-04-02 PROCEDURE — 99203 OFFICE O/P NEW LOW 30 MIN: CPT | Mod: 25 | Performed by: ALLERGY & IMMUNOLOGY

## 2024-04-02 PROCEDURE — 85025 COMPLETE CBC W/AUTO DIFF WBC: CPT

## 2024-04-02 PROCEDURE — 86003 ALLG SPEC IGE CRUDE XTRC EA: CPT

## 2024-04-02 ASSESSMENT — ASTHMA QUESTIONNAIRES
QUESTION_5 LAST FOUR WEEKS HOW WOULD YOU RATE YOUR ASTHMA CONTROL: SOMEWHAT CONTROLLED
ACT_TOTALSCORE: 12
QUESTION_4 LAST FOUR WEEKS HOW OFTEN HAVE YOU USED YOUR RESCUE INHALER OR NEBULIZER MEDICATION (SUCH AS ALBUTEROL): ONE OR TWO TIMES PER DAY
ACT_TOTALSCORE: 12
EMERGENCY_ROOM_LAST_YEAR_TOTAL: TWO
QUESTION_1 LAST FOUR WEEKS HOW MUCH OF THE TIME DID YOUR ASTHMA KEEP YOU FROM GETTING AS MUCH DONE AT WORK, SCHOOL OR AT HOME: SOME OF THE TIME
QUESTION_2 LAST FOUR WEEKS HOW OFTEN HAVE YOU HAD SHORTNESS OF BREATH: MORE THAN ONCE A DAY
ACUTE_EXACERBATION_TODAY: NO
QUESTION_3 LAST FOUR WEEKS HOW OFTEN DID YOUR ASTHMA SYMPTOMS (WHEEZING, COUGHING, SHORTNESS OF BREATH, CHEST TIGHTNESS OR PAIN) WAKE YOU UP AT NIGHT OR EARLIER THAN USUAL IN THE MORNING: ONCE A WEEK

## 2024-04-02 NOTE — PROGRESS NOTES
Scar Bray is a 44 year old, presenting for the following health issues:  Allergy Consult (Environmental allergies)    HPI     Chief complaint:  Allergic rhinitis, asthma    History of present illness: This is a pleasant 44-year-old woman that I was asked to see for evaluation of allergies and asthma by Isadora Cobb.  Patient previously was a patient of Dr. Quezada on allergy shots.  Completed them in 2018.  She reports that last year in August, she had some mold damage to her home.  She then states that following the fall she started to have worsening breathing symptoms.  She actually had to go to the emergency room twice once by ambulance for breathing symptoms.  She is now been seen by Dr. Fink in pulmonary and is currently taking Spiriva 2.5 mcg 2 puffs daily as well as Symbicort 160/4.5 2 puffs twice daily.  Despite this she has to use her albuterol inhaler at least once per day for tightness.  She feels that it is more in her lower chest.  She states that it does not [employer] wake her up from sleep.  She has some coughing.  She had a recent pulmonary function test that was normal.  In review of the record eosinophils have been as high as 1800.  Chest x-ray performed has been negative for pathology.  No recent allergy testing.  She does not currently use any allergy medication.    Past medical history: Anxiety depression per chart    Social history: She currently works from home, non-smoker, otherwise as listed in his present illness, the mold has now been resolved              Objective    Pulse 71   Wt 102.1 kg (225 lb)   SpO2 98%   BMI 36.32 kg/m    Body mass index is 36.32 kg/m .  Physical Exam   Gen: Pleasant female not in acute distress  HEENT: Eyes no erythema of the bulbar or palpebral conjunctiva, no edema. Nose: No congestion, mucosa normal. Mouth: Throat clear, no lip or tongue edema.   Respiratory: Clear to auscultation bilaterally, no adventitious breath sounds  Skin: No rashes or  lesions  Psych: Alert and oriented times 3      At today s visit the patient/parent and I engaged in an informed consent discussion about allergy testing.  We discussed skin testing, blood testing,  and the alternative of not undergoing any testing. The patient/ parent has a preference for skin testing. We then discussed the risks and benefits of skin testing.  The patient/ parent understands skin testing risks can include, but are not limited to, urticaria, angioedema, shortness of breath, and severe anaphylaxis.  The benefits include, but are not limited, to evaluation for allergens causing symptoms.  After answering the patients/parents questions they have agreed to proceed with skin testing.    30 percutaneous test were undertaken to the environmental skin test panel.  Positive histamine control with positive test to tree pollen, dust mites, weed pollen, dog, molds were negative.  Impression report and plan:    1.  Allergic rhinitis  2.  Shortness of breath  3.  Moderate persistent asthma  4.  Eosinophilia              Recheckeosinophil count today.  She has not been on prednisone since January.  If elevated, consider systemic workup for eosinophilia including chest CT.  Recheck specific IgE to molds.  Could consider allergy shots but I do not think this tightness she is reporting is only due to allergies.  Not sure this is entirely due to asthma as it is not completely mediated by albuterol and her pulmonary function test was normal.  I will discuss with her pulmonologist.    Signed Electronically by: Angi BALTAZAR MD

## 2024-04-02 NOTE — Clinical Note
I saw this patient today.  Allergy testing was positive for dust mites and pollens as well as dog.  However, no molds were negative.  I noted eosinophilia previously but I rechecked her eosinophil count today and it is normal.  I am not sure her current tightness is allergen induced asthma given how she describes this and her PFT.  She states the albuterol does not alleviate symptoms. Happy to consider allergy shots, but I am not sure they will alleviate this pain she is describing.  Molds were negative on testing, so I am rechecking these as well.  Angi

## 2024-04-02 NOTE — LETTER
4/2/2024         RE: Katarina Mcclain  [address] [address]        Dear Colleague,    Thank you for referring your patient, Katarina Mcclain, to the [facility]. Please see a copy of my visit note below.          Subjective  Katarina is a 44 year old, presenting for the following health issues:  Allergy Consult (Environmental allergies)    HPI     Chief complaint:  Allergic rhinitis, asthma    History of present illness: This is a pleasant 44-year-old woman that I was asked to see for evaluation of allergies and asthma by Isadora Cobb.  Patient previously was a patient of Dr. Quezada on allergy shots.  Completed them in 2018.  She reports that last year in August, she had some mold damage to her home.  She then states that following the fall she started to have worsening breathing symptoms.  She actually had to go to the emergency room twice once by ambulance for breathing symptoms.  She is now been seen by Dr. Fink in pulmonary and is currently taking Spiriva 2.5 mcg 2 puffs daily as well as Symbicort 160/4.5 2 puffs twice daily.  Despite this she has to use her albuterol inhaler at least once per day for tightness.  She feels that it is more in her lower chest.  She states that it does not [employer] wake her up from sleep.  She has some coughing.  She had a recent pulmonary function test that was normal.  In review of the record eosinophils have been as high as 1800.  Chest x-ray performed has been negative for pathology.  No recent allergy testing.  She does not currently use any allergy medication.    Past medical history: Anxiety depression per chart    Social history: She currently works from home, non-smoker, otherwise as listed in his present illness, the mold has now been resolved              Objective   Pulse 71   Wt 102.1 kg (225 lb)   SpO2 98%   BMI 36.32 kg/m    Body mass index is 36.32 kg/m .  Physical Exam   Gen: Pleasant female not in acute  distress  HEENT: Eyes no erythema of the bulbar or palpebral conjunctiva, no edema. Nose: No congestion, mucosa normal. Mouth: Throat clear, no lip or tongue edema.   Respiratory: Clear to auscultation bilaterally, no adventitious breath sounds  Skin: No rashes or lesions  Psych: Alert and oriented times 3      At today s visit the patient/parent and I engaged in an informed consent discussion about allergy testing.  We discussed skin testing, blood testing,  and the alternative of not undergoing any testing. The patient/ parent has a preference for skin testing. We then discussed the risks and benefits of skin testing.  The patient/ parent understands skin testing risks can include, but are not limited to, urticaria, angioedema, shortness of breath, and severe anaphylaxis.  The benefits include, but are not limited, to evaluation for allergens causing symptoms.  After answering the patients/parents questions they have agreed to proceed with skin testing.    30 percutaneous test were undertaken to the environmental skin test panel.  Positive histamine control with positive test to tree pollen, dust mites, weed pollen, dog, molds were negative.  Impression report and plan:    1.  Allergic rhinitis  2.  Shortness of breath  3.  Moderate persistent asthma  4.  Eosinophilia              Recheckeosinophil count today.  She has not been on prednisone since January.  If elevated, consider systemic workup for eosinophilia including chest CT.  Recheck specific IgE to molds.  Could consider allergy shots but I do not think this tightness she is reporting is only due to allergies.  Not sure this is entirely due to asthma as it is not completely mediated by albuterol and her pulmonary function test was normal.  I will discuss with her pulmonologist.    Signed Electronically by: Angi BALTAZAR MD        Again, thank you for allowing me to participate in the care of your patient.        Sincerely,        Angi BALTAZAR MD

## 2024-04-04 DIAGNOSIS — J45.40 MODERATE PERSISTENT ASTHMA WITHOUT COMPLICATION: ICD-10-CM

## 2024-04-04 DIAGNOSIS — D72.19 OTHER EOSINOPHILIA: Primary | ICD-10-CM

## 2024-04-04 LAB
A ALTERNATA IGE QN: 0.12 KU(A)/L
A FUMIGATUS IGE QN: 0.29 KU(A)/L
IGE SERPL-ACNC: 735 KU/L (ref 0–114)
P NOTATUM IGE QN: 0.16 KU(A)/L
R NIGRICANS IGE QN: 0.17 KU(A)/L

## 2024-04-08 ENCOUNTER — HOSPITAL ENCOUNTER (OUTPATIENT)
Dept: CT IMAGING | Facility: HOSPITAL | Age: 44
Discharge: HOME | End: 2024-04-08
Attending: ALLERGY & IMMUNOLOGY | Admitting: ALLERGY & IMMUNOLOGY
Payer: COMMERCIAL

## 2024-04-08 DIAGNOSIS — D72.19 OTHER EOSINOPHILIA: ICD-10-CM

## 2024-04-08 DIAGNOSIS — J45.40 MODERATE PERSISTENT ASTHMA WITHOUT COMPLICATION: ICD-10-CM

## 2024-04-08 PROCEDURE — 71250 CT THORAX DX C-: CPT

## 2024-04-09 DIAGNOSIS — K21.9 GASTROESOPHAGEAL REFLUX DISEASE WITHOUT ESOPHAGITIS: Primary | ICD-10-CM

## 2024-04-15 ENCOUNTER — TELEPHONE (OUTPATIENT)
Dept: SURGERY | Facility: CLINIC | Age: 44
End: 2024-04-15

## 2024-04-15 ENCOUNTER — OFFICE VISIT (OUTPATIENT)
Dept: SURGERY | Facility: CLINIC | Age: 44
End: 2024-04-15
Attending: ALLERGY & IMMUNOLOGY
Payer: COMMERCIAL

## 2024-04-15 VITALS
SYSTOLIC BLOOD PRESSURE: 120 MMHG | DIASTOLIC BLOOD PRESSURE: 82 MMHG | HEIGHT: 66 IN | BODY MASS INDEX: 36 KG/M2 | WEIGHT: 224 LBS

## 2024-04-15 DIAGNOSIS — K21.9 GASTROESOPHAGEAL REFLUX DISEASE WITHOUT ESOPHAGITIS: Primary | ICD-10-CM

## 2024-04-15 PROCEDURE — 99204 OFFICE O/P NEW MOD 45 MIN: CPT | Performed by: SURGERY

## 2024-04-15 ASSESSMENT — PATIENT HEALTH QUESTIONNAIRE - PHQ9
SUM OF ALL RESPONSES TO PHQ QUESTIONS 1-9: 2
SUM OF ALL RESPONSES TO PHQ QUESTIONS 1-9: 2
10. IF YOU CHECKED OFF ANY PROBLEMS, HOW DIFFICULT HAVE THESE PROBLEMS MADE IT FOR YOU TO DO YOUR WORK, TAKE CARE OF THINGS AT HOME, OR GET ALONG WITH OTHER PEOPLE: NOT DIFFICULT AT ALL

## 2024-04-15 NOTE — TELEPHONE ENCOUNTER
Returned patient's call regarding question of her post op. Invited patient to call back when ready

## 2024-04-15 NOTE — PROGRESS NOTES
HPI: Katarina Mcclain is a 44 year old female referred to see me by Isadora Cobb for evaluation of gastroesophageal reflux disease.  She notes  a history of gastroesophageal reflux disease described as significant esophageal burning despite taking 20 mg of omeprazole.  She does have to increase her dosage sometimes at night in order to decrease the symptoms.  She has a history of gastric bypass.  She denies any spontaneous regurgitation, nighttime cough and is able to lay flat at night.  She has been evaluated by Dr. Baez for allergies and possible mold involvement in her house.  Additionally, she is undergone a CT scan which shows a moderately sized hiatal hernia.        Allergies:Other environmental allergy and Oxycodone-acetaminophen    Past Medical History:   Diagnosis Date    Allergic rhinitis     Created by Conversion Replacement Utility updated for latest IMO load     Allergic rhinitis, cause unspecified     seasonal allergies    Anemia     Created by Conversion     Anxiety     Asthma     Carbuncle and furuncle of trunk 04/2001    R breast    Cervical high risk HPV (human papillomavirus) test positive 12/14/2022 08/23/17 NIL Pap, Neg HR HPV  12/7/22 NIL Pap, + HR HPV (not 16 or 18) Plan cotest in 1 year due 12/7/23 12/14/22 Pt was advised.     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    Obesity, unspecified     Oral Allergy Syndrome     Created by Conversion     Pars defect of lumbar spine 05/17/2016    Personal history of tobacco use, presenting hazards to health     Scheuermann's kyphosis 05/17/2016    Vitamin B12 Deficiency     Created by Conversion     Vitamin D deficiency     Created by Conversion Replacement Utility updated for latest IMO load        Past Surgical History:   Procedure Laterality Date     CHOLECYSTECTOMY  2/27/2004    COLONOSCOPY      EYE SURGERY  1/15/2005    Lasik    ORTHOPEDIC SURGERY      Hammer Toe fix    REVISION AMADOU-EN-Y      ZZC GASTRIC BYPASS,OBESE<100CM AMADOU-EN-Y  02/27/2004    [facility]       CURRENT MEDS:    Current Outpatient Medications:     albuterol (PROAIR HFA/PROVENTIL HFA/VENTOLIN HFA) 108 (90 Base) MCG/ACT inhaler, Inhale 2 puffs into the lungs every 6 hours as needed for shortness of breath, wheezing or cough, Disp: 18 g, Rfl: 3    budesonide-formoterol (SYMBICORT) 160-4.5 MCG/ACT Inhaler, Inhale 2 puffs into the lungs 2 times daily, Disp: 10.2 g, Rfl: 3    cetirizine (ZYRTEC) 5 MG tablet, Take 10 mg by mouth daily, Disp: , Rfl:     citalopram (CELEXA) 20 MG tablet, Take 1 tablet (20 mg) by mouth daily, Disp: 90 tablet, Rfl: 3    multivitamin w/minerals (MULTI-VITAMIN) tablet, Take 1 tablet by mouth daily, Disp: , Rfl:     omeprazole (PRILOSEC) 20 MG DR capsule, Take 1 capsule (20 mg) by mouth 2 times daily, Disp: 180 capsule, Rfl: 1    tiotropium (SPIRIVA RESPIMAT) 2.5 MCG/ACT inhaler, Inhale 2 puffs into the lungs daily, Disp: 4 g, Rfl: 11    Family History   Problem Relation Age of Onset    Hypertension Other         grandfather    Coronary Artery Disease Other     Hyperlipidemia Other     Anxiety Disorder Other     Eye Disorder Other         grandfather cataracts    Breast Cancer Other         found at 32, passed away 42    Diabetes Other         uncle    Breast Cancer Other     Thyroid Disease Other     Hypertension Other         uncle    Breast Cancer Other     Thyroid Disease Other     Heart Disease Other         grandfather heart disease    Depression Other     Heart Disease Other         grandfather heart disease    Thyroid Disease Mother     Diabetes Mother         many of my Aunts and Uncles as well.    Breast Cancer Maternal Aunt     Breast Cancer Maternal Aunt     Breast Cancer Maternal Aunt     Coronary Artery Disease Maternal Grandfather     Coronary  "Artery Disease Paternal Grandfather     Coronary Artery Disease Cousin         Passed away in early 40's    Other Cancer Brother         reports that she quit smoking about 2 years ago. Her smoking use included cigarettes. She started smoking about 9 years ago. She has a 5 pack-year smoking history. She has been exposed to tobacco smoke. She has never used smokeless tobacco. She reports current alcohol use. She reports that she does not use drugs.    Review of Systems:  The 12 system review is within normal limits except for as mentioned above.  General ROS: No complaints or constitutional symptoms  Ophthalmic ROS: No complaints of visual changes  Skin: No complaints or symptoms   Endocrine: No complaints or symptoms  Hematologic/Lymphatic: No symptoms or complaints  Psychiatric: No symptoms or complaints  Respiratory ROS: no cough, shortness of breath, or wheezing  Cardiovascular ROS: no chest pain or dyspnea on exertion  Gastrointestinal ROS: As per HPI  Genito-Urinary ROS: no dysuria, trouble voiding, or hematuria  Musculoskeletal ROS: no joint or muscle pain  Neurological ROS: no TIA or stroke symptoms      EXAM:  /82   Ht 1.676 m (5' 6\")   Wt 101.6 kg (224 lb)   BMI 36.15 kg/m    GENERAL: Well developed female, No acute distress, pleasant and conversant   EYES: Pupils equal, round and reactive, no scleral icterus  ABDOMEN: Soft, non-tender, no masses, non-distended  SKIN: Pink, warm and dry, no obvious rashes or lesions   NEURO:No focal deficits, ambulatory  MUSCULOSKELETAL:No obvious deformities, no swelling, normal appearing      LABS:  Lab Results   Component Value Date    WBC 8.2 04/02/2024    HGB 9.3 04/02/2024    HGB 13.5 12/31/1999    HCT 31.1 04/02/2024    MCV 74 04/02/2024     04/02/2024     INR/Prothrombin Time  @LABRCNTIP(NA,K,CL,co2,bun,creatinine,labglom,glucose,calcium)@  Lab Results   Component Value Date    ALT 23 01/29/2024    AST 21 01/29/2024    ALKPHOS 95 01/29/2024 "       IMAGES:   Relevant images were reviewed and discussed with the patient.  Notable findings were: CT scan evaluated demonstrates a moderately sized hiatal hernia    Assessment/Plan:   Katarina Mcclain is a 44 year old female with signs and symptoms consistent with gastroesophageal reflux disease.  I have explained the pathophysiology of GERD and hiatal hernias in detail as well as the surgical versus non-operative management strategies.      At this point we will proceed with continued initial work-up.  This will include an esophagram as well as an endoscopy for better characterization.  Once these are completed we will reconvene and discuss management strategies moving forward.    Jayce Chacko,  Merged with [facility]  [phone]  [facility] Department of Surgery

## 2024-04-15 NOTE — LETTER
4/15/2024         RE: Katarina Mcclain  [address]  [facility] [place] [address]        Dear Colleague,    Thank you for referring your patient, Katarina Mcclain, to the [facility] AND [facility] [place]. Please see a copy of my visit note below.    HPI: Katarina Mcclain is a 44 year old female referred to see me by Isadora Cobb for evaluation of gastroesophageal reflux disease.  She notes  a history of gastroesophageal reflux disease described as significant esophageal burning despite taking 20 mg of omeprazole.  She does have to increase her dosage sometimes at night in order to decrease the symptoms.  She has a history of gastric bypass.  She denies any spontaneous regurgitation, nighttime cough and is able to lay flat at night.  She has been evaluated by Dr. Baez for allergies and possible mold involvement in her house.  Additionally, she is undergone a CT scan which shows a moderately sized hiatal hernia.        Allergies:Other environmental allergy and Oxycodone-acetaminophen    Past Medical History:   Diagnosis Date     Allergic rhinitis     Created by Conversion Replacement Utility updated for latest IMO load      Allergic rhinitis, cause unspecified     seasonal allergies     Anemia     Created by Conversion      Anxiety      Asthma      Carbuncle and furuncle of trunk 04/2001    R breast     Cervical high risk HPV (human papillomavirus) test positive 12/14/2022 08/23/17 NIL Pap, Neg HR HPV  12/7/22 NIL Pap, + HR HPV (not 16 or 18) Plan cotest in 1 year due 12/7/23 12/14/22 Pt was advised.      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     Obesity, unspecified      Oral Allergy Syndrome     Created by Conversion      Pars defect of lumbar spine 05/17/2016     Personal history  of tobacco use, presenting hazards to health      Scheuermann's kyphosis 05/17/2016     Vitamin B12 Deficiency     Created by Conversion      Vitamin D deficiency     Created by Conversion Replacement Utility updated for latest IMO load        Past Surgical History:   Procedure Laterality Date     CHOLECYSTECTOMY  2/27/2004     COLONOSCOPY       EYE SURGERY  1/15/2005    Lasik     ORTHOPEDIC SURGERY      Hammer Toe fix     REVISION AMADOU-EN-Y       ZZC GASTRIC BYPASS,OBESE<100CM AMADOU-EN-Y  02/27/2004    [facility]       CURRENT MEDS:    Current Outpatient Medications:      albuterol (PROAIR HFA/PROVENTIL HFA/VENTOLIN HFA) 108 (90 Base) MCG/ACT inhaler, Inhale 2 puffs into the lungs every 6 hours as needed for shortness of breath, wheezing or cough, Disp: 18 g, Rfl: 3     budesonide-formoterol (SYMBICORT) 160-4.5 MCG/ACT Inhaler, Inhale 2 puffs into the lungs 2 times daily, Disp: 10.2 g, Rfl: 3     cetirizine (ZYRTEC) 5 MG tablet, Take 10 mg by mouth daily, Disp: , Rfl:      citalopram (CELEXA) 20 MG tablet, Take 1 tablet (20 mg) by mouth daily, Disp: 90 tablet, Rfl: 3     multivitamin w/minerals (MULTI-VITAMIN) tablet, Take 1 tablet by mouth daily, Disp: , Rfl:      omeprazole (PRILOSEC) 20 MG DR capsule, Take 1 capsule (20 mg) by mouth 2 times daily, Disp: 180 capsule, Rfl: 1     tiotropium (SPIRIVA RESPIMAT) 2.5 MCG/ACT inhaler, Inhale 2 puffs into the lungs daily, Disp: 4 g, Rfl: 11    Family History   Problem Relation Age of Onset     Hypertension Other         grandfather     Coronary Artery Disease Other      Hyperlipidemia Other      Anxiety Disorder Other      Eye Disorder Other         grandfather cataracts     Breast Cancer Other         found at 32, passed away 42     Diabetes Other         uncle     Breast Cancer Other      Thyroid Disease Other      Hypertension Other         uncle     Breast Cancer Other      Thyroid Disease Other      Heart Disease Other         grandfather heart disease  "    Depression Other      Heart Disease Other         grandfather heart disease     Thyroid Disease Mother      Diabetes Mother         many of my Aunts and Uncles as well.     Breast Cancer Maternal Aunt      Breast Cancer Maternal Aunt      Breast Cancer Maternal Aunt      Coronary Artery Disease Maternal Grandfather      Coronary Artery Disease Paternal Grandfather      Coronary Artery Disease Cousin         Passed away in early 40's     Other Cancer Brother         reports that she quit smoking about 2 years ago. Her smoking use included cigarettes. She started smoking about 9 years ago. She has a 5 pack-year smoking history. She has been exposed to tobacco smoke. She has never used smokeless tobacco. She reports current alcohol use. She reports that she does not use drugs.    Review of Systems:  The 12 system review is within normal limits except for as mentioned above.  General ROS: No complaints or constitutional symptoms  Ophthalmic ROS: No complaints of visual changes  Skin: No complaints or symptoms   Endocrine: No complaints or symptoms  Hematologic/Lymphatic: No symptoms or complaints  Psychiatric: No symptoms or complaints  Respiratory ROS: no cough, shortness of breath, or wheezing  Cardiovascular ROS: no chest pain or dyspnea on exertion  Gastrointestinal ROS: As per HPI  Genito-Urinary ROS: no dysuria, trouble voiding, or hematuria  Musculoskeletal ROS: no joint or muscle pain  Neurological ROS: no TIA or stroke symptoms      EXAM:  /82   Ht 1.676 m (5' 6\")   Wt 101.6 kg (224 lb)   BMI 36.15 kg/m    GENERAL: Well developed female, No acute distress, pleasant and conversant   EYES: Pupils equal, round and reactive, no scleral icterus  ABDOMEN: Soft, non-tender, no masses, non-distended  SKIN: Pink, warm and dry, no obvious rashes or lesions   NEURO:No focal deficits, ambulatory  MUSCULOSKELETAL:No obvious deformities, no swelling, normal appearing      LABS:  Lab Results   Component Value " Date    WBC 8.2 04/02/2024    HGB 9.3 04/02/2024    HGB 13.5 12/31/1999    HCT 31.1 04/02/2024    MCV 74 04/02/2024     04/02/2024     INR/Prothrombin Time  @LABRCNTIP(NA,K,CL,co2,bun,creatinine,labglom,glucose,calcium)@  Lab Results   Component Value Date    ALT 23 01/29/2024    AST 21 01/29/2024    ALKPHOS 95 01/29/2024       IMAGES:   Relevant images were reviewed and discussed with the patient.  Notable findings were: CT scan evaluated demonstrates a moderately sized hiatal hernia    Assessment/Plan:   Katarina Mcclain is a 44 year old female with signs and symptoms consistent with gastroesophageal reflux disease.  I have explained the pathophysiology of GERD and hiatal hernias in detail as well as the surgical versus non-operative management strategies.      At this point we will proceed with continued initial work-up.  This will include an esophagram as well as an endoscopy for better characterization.  Once these are completed we will reconvene and discuss management strategies moving forward.    Jayce Chacko DO [facility]  [phone]  [facility] Department of Surgery      Again, thank you for allowing me to participate in the care of your patient.        Sincerely,        Jayce Chacko DO

## 2024-04-16 ENCOUNTER — OFFICE VISIT (OUTPATIENT)
Dept: PEDIATRICS | Facility: CLINIC | Age: 44
End: 2024-04-16
Payer: COMMERCIAL

## 2024-04-16 VITALS
RESPIRATION RATE: 15 BRPM | SYSTOLIC BLOOD PRESSURE: 118 MMHG | WEIGHT: 227 LBS | DIASTOLIC BLOOD PRESSURE: 80 MMHG | OXYGEN SATURATION: 100 % | TEMPERATURE: 98 F | HEIGHT: 66 IN | HEART RATE: 76 BPM | BODY MASS INDEX: 36.48 KG/M2

## 2024-04-16 DIAGNOSIS — K21.9 GASTROESOPHAGEAL REFLUX DISEASE WITHOUT ESOPHAGITIS: ICD-10-CM

## 2024-04-16 DIAGNOSIS — D50.9 IRON DEFICIENCY ANEMIA, UNSPECIFIED IRON DEFICIENCY ANEMIA TYPE: ICD-10-CM

## 2024-04-16 DIAGNOSIS — Z01.818 PREOP GENERAL PHYSICAL EXAM: Primary | ICD-10-CM

## 2024-04-16 DIAGNOSIS — K44.9 HIATAL HERNIA: ICD-10-CM

## 2024-04-16 PROBLEM — E66.812 CLASS 2 SEVERE OBESITY DUE TO EXCESS CALORIES WITH SERIOUS COMORBIDITY IN ADULT (H): Status: ACTIVE | Noted: 2024-04-16

## 2024-04-16 PROBLEM — E66.01 CLASS 2 SEVERE OBESITY DUE TO EXCESS CALORIES WITH SERIOUS COMORBIDITY IN ADULT (H): Status: ACTIVE | Noted: 2024-04-16

## 2024-04-16 PROCEDURE — 99213 OFFICE O/P EST LOW 20 MIN: CPT | Performed by: INTERNAL MEDICINE

## 2024-04-16 ASSESSMENT — PAIN SCALES - GENERAL: PAINLEVEL: NO PAIN (0)

## 2024-04-16 NOTE — PROGRESS NOTES
Preoperative Evaluation  [facility]  [address]  [address]  [place] [address]  Phone: [phone]  Fax: [phone]  Primary Provider: Isadora Cobb  Pre-op Performing Provider: ROSITA DAMIAN  Apr 16, 2024       Katarina is a 44 year old, presenting for the following:  Pre-Op Exam        4/16/2024    10:36 AM   Additional Questions   Roomed by Ana Maria Verma     Surgical Information  Surgery/Procedure: ESOPHAGOGASTRODUODENOSCOPY, WITH BIOPSY   Surgery Location:  [facility] OR   Surgeon: Jayce Chacko,    Surgery Date: 5/2/2024   Time of Surgery: 2:55 PM   Where patient plans to recover: At home with family  Fax number for surgical facility: Note does not need to be faxed, will be available electronically in Epic.    Assessment & Plan     The proposed surgical procedure is considered LOW risk.    (Z01.818) Preop general physical exam  (primary encounter diagnosis)    (K21.9) Gastroesophageal reflux disease without esophagitis    (K44.9) Hiatal hernia    (D50.9) Iron deficiency anemia, unspecified iron deficiency anemia type  Comment:   Plan: anemia 2/2 malabsorption (prior tova en y gastric bypass).  stable  Lab Results   Component Value Date    HGB 9.3 04/02/2024        - No identified additional risk factors other than previously addressed        Recommendation  APPROVAL GIVEN to proceed with proposed procedure, without further diagnostic evaluation.          Subjective       HPI related to upcoming procedure: Katarina Mcclain is a 44 year old who presents for preoperative evaluation as requested by Dr Chacko prior to EGD for evaluation of GERD, hiatal hernia.         4/15/2024     4:29 PM   Preop Questions   1. Have you ever had a heart attack or stroke? No   2. Have you ever had surgery on your heart or blood vessels, such as a stent placement, a coronary artery bypass, or surgery on an artery in your head, neck, heart, or legs? No   3. Do you have  chest pain with activity? No   4. Do you have a history of  heart failure? No   5. Do you currently have a cold, bronchitis or symptoms of other infection? No   6. Do you have a cough, shortness of breath, or wheezing? No   7. Do you or anyone in your family have previous history of blood clots? No   8. Do you or does anyone in your family have a serious bleeding problem such as prolonged bleeding following surgeries or cuts? No   9. Have you ever had problems with anemia or been told to take iron pills? Hx of anemia for several years     10. Have you had any abnormal blood loss such as black, tarry or bloody stools, or abnormal vaginal bleeding? No   11. Have you ever had a blood transfusion? No   12. Are you willing to have a blood transfusion if it is medically needed before, during, or after your surgery? Yes   13. Have you or any of your relatives ever had problems with anesthesia? No   14. Do you have sleep apnea, excessive snoring or daytime drowsiness? No   15. Do you have any artifical heart valves or other implanted medical devices like a pacemaker, defibrillator, or continuous glucose monitor? No   16. Do you have artificial joints? No   17. Are you allergic to latex? No   18. Is there any chance that you may be pregnant? No             Patient Active Problem List    Diagnosis Date Noted    Gastroesophageal reflux disease without esophagitis 04/15/2024     Priority: Medium    Cervical high risk HPV (human papillomavirus) test positive 12/14/2022     Priority: Medium     08/23/17 NIL Pap, Neg HR HPV   12/7/22 NIL Pap, + HR HPV (not 16 or 18) Plan cotest in 1 year due 12/7/23 02/14/24 NIL Pap, + HR HPV (neg 16/18). [place] due by 5/14/24  3/25/24 COLP and ECC and polyp- Negative for dysplasia. Plan cotest in 1 year due by 3/25/25          Hypertrophy of bone 10/09/2019     Priority: Medium     Added automatically from request for surgery [id number]        Mild major depression (H) 10/25/2018     Priority: Medium     Vitamin D Deficiency      Priority: Medium     Created by Conversion  Replacement Utility updated for latest IMO load        Allergic Rhinitis      Priority: Medium     Created by Conversion  Replacement Utility updated for latest IMO load        Scheuermann's kyphosis 05/17/2016     Priority: Medium    Pars defect of lumbar spine 05/17/2016     Priority: Medium    Vitamin B12 Deficiency      Priority: Medium     Created by Conversion        Anemia      Priority: Medium     Created by Conversion        Generalized Anxiety Disorder      Priority: Medium     Created by Conversion        Oral Allergy Syndrome      Priority: Medium     Created by Conversion          Past Medical History:   Diagnosis Date    Allergic rhinitis     Created by Conversion Replacement Utility updated for latest IMO load     Allergic rhinitis, cause unspecified     seasonal allergies    Anemia     Created by Conversion     Anxiety     Asthma     Carbuncle and furuncle of trunk 04/2001    R breast    Cervical high risk HPV (human papillomavirus) test positive 12/14/2022 08/23/17 NIL Pap, Neg HR HPV  12/7/22 NIL Pap, + HR HPV (not 16 or 18) Plan cotest in 1 year due 12/7/23 12/14/22 Pt was advised.     Depressive disorder     Generalized anxiety disorder     Created by Conversion     Hidradenitis 04/1995    Hypertrophy of bone 10/09/2019    Added automatically from request for surgery [id number]     Mild major depression (H) 10/25/2018    Obesity, unspecified     Oral Allergy Syndrome     Created by Conversion     Pars defect of lumbar spine 05/17/2016    Personal history of tobacco use, presenting hazards to health     Scheuermann's kyphosis 05/17/2016    Vitamin B12 Deficiency     Created by Conversion     Vitamin D deficiency     Created by Conversion Replacement Utility updated for latest IMO load      Past Surgical History:   Procedure Laterality Date    CHOLECYSTECTOMY  2/27/2004    COLONOSCOPY      EYE SURGERY  1/15/2005    Lasik     ORTHOPEDIC SURGERY      Hammer Toe fix    REVISION AMADOU-EN-Y      ZZC GASTRIC BYPASS,OBESE<100CM AMADOU-EN-Y  2004    [facility]     Current Outpatient Medications   Medication Sig Dispense Refill    albuterol (PROAIR HFA/PROVENTIL HFA/VENTOLIN HFA) 108 (90 Base) MCG/ACT inhaler Inhale 2 puffs into the lungs every 6 hours as needed for shortness of breath, wheezing or cough 18 g 3    budesonide-formoterol (SYMBICORT) 160-4.5 MCG/ACT Inhaler Inhale 2 puffs into the lungs 2 times daily 10.2 g 3    cetirizine (ZYRTEC) 5 MG tablet Take 10 mg by mouth daily      citalopram (CELEXA) 20 MG tablet Take 1 tablet (20 mg) by mouth daily 90 tablet 3    multivitamin w/minerals (MULTI-VITAMIN) tablet Take 1 tablet by mouth daily      omeprazole (PRILOSEC) 20 MG DR capsule Take 1 capsule (20 mg) by mouth 2 times daily 180 capsule 1    tiotropium (SPIRIVA RESPIMAT) 2.5 MCG/ACT inhaler Inhale 2 puffs into the lungs daily 4 g 11       Allergies   Allergen Reactions    Other Environmental Allergy Other (See Comments)     Seasonal-ragweed, birch, dust mites    Oxycodone-Acetaminophen Nausea and Vomiting        Social History     Tobacco Use    Smoking status: Former     Current packs/day: 0.00     Average packs/day: 0.5 packs/day for 10.0 years (5.0 ttl pk-yrs)     Types: Cigarettes     Start date: 9/3/2014     Quit date: 10/1/2021     Years since quittin.5     Passive exposure: Past    Smokeless tobacco: Never    Tobacco comments:     no passive exposure   Substance Use Topics    Alcohol use: Yes     Comment: Alcoholic Drinks/day: weekends       History   Drug Use No         Review of Systems    Review of Systems  Constitutional, neuro, ENT, endocrine, pulmonary, cardiac, gastrointestinal, genitourinary, musculoskeletal, integument and psychiatric systems are negative, except as otherwise noted.    Objective    /80 (BP Location: Right arm, Patient Position: Sitting, Cuff Size: Adult Large)   Pulse 76   Temp  "98  F (36.7  C) (Oral)   Resp 15   Ht 1.676 m (5' 6\")   Wt 103 kg (227 lb)   SpO2 100%   BMI 36.64 kg/m     Estimated body mass index is 36.64 kg/m  as calculated from the following:    Height as of this encounter: 1.676 m (5' 6\").    Weight as of this encounter: 103 kg (227 lb).  Physical Exam  GENERAL: alert and no distress  EYES: Eyes grossly normal to inspection, PERRL and conjunctivae and sclerae normal  HENT: ear canals and TM's normal, nose and mouth without ulcers or lesions  NECK: no adenopathy, no asymmetry, masses, or scars  RESP: lungs clear to auscultation - no rales, rhonchi or wheezes  CV: regular rate and rhythm, normal S1 S2, no S3 or S4, no murmur, click or rub, no peripheral edema  ABDOMEN: soft, nontender, no hepatosplenomegaly, no masses and bowel sounds normal  MS: no gross musculoskeletal defects noted, no edema  SKIN: no suspicious lesions or rashes  NEURO: Normal strength and tone, mentation intact and speech normal  PSYCH: mentation appears normal, affect normal/bright    Recent Labs   Lab Test 04/02/24  1505 03/18/24  0825 02/14/24  0852 01/29/24  2048 11/20/23  1306 07/31/23  1008 12/07/22  1724   HGB 9.3* 9.2*  --  10.0* 10.3*   < >  --      --   --  316 344   < >  --    INR  --   --   --  1.08  --   --   --    NA  --   --   --  140 138   < >  --    POTASSIUM  --   --   --  3.4 3.8   < >  --    CR  --   --   --  0.62 0.52   < >  --    A1C  --   --  5.9*  --   --   --  5.6    < > = values in this interval not displayed.        Diagnostics  No labs were ordered during this visit.   No EKG required, no history of coronary heart disease, significant arrhythmia, peripheral arterial disease or other structural heart disease.    Revised Cardiac Risk Index (RCRI)  The patient has the following serious cardiovascular risks for perioperative complications:   - No serious cardiac risks = 0 points     RCRI Interpretation: 0 points: Class I (very low risk - 0.4% complication rate)     "     Signed Electronically by: Ronnie Carlson MD  Copy of this evaluation report is provided to requesting physician.

## 2024-04-16 NOTE — PATIENT INSTRUCTIONS
Preparing for Your Surgery  Getting started  A nurse will call you to review your health history and instructions. They will give you an arrival time based on your scheduled surgery time. Please be ready to share:  Your doctor's clinic name and phone number  Your medical, surgical, and anesthesia history  A list of allergies and sensitivities  A list of medicines, including herbal treatments and over-the-counter drugs  Whether the patient has a legal guardian (ask how to send us the papers in advance)  Please tell us if you're pregnant--or if there's any chance you might be pregnant. Some surgeries may injure a fetus (unborn baby), so they require a pregnancy test. Surgeries that are safe for a fetus don't always need a test, and you can choose whether to have one.   If you have a child who's having surgery, please ask for a copy of Preparing for Your Child's Surgery.    Preparing for surgery  Within 10 to 30 days of surgery: Have a pre-op exam (sometimes called an H&P, or History and Physical). This can be done at a clinic or pre-operative center.  If you're having a , you may not need this exam. Talk to your care team.  At your pre-op exam, talk to your care team about all medicines you take. If you need to stop any medicines before surgery, ask when to start taking them again.  We do this for your safety. Many medicines can make you bleed too much during surgery. Some change how well surgery (anesthesia) drugs work.  Call your insurance company to let them know you're having surgery. (If you don't have insurance, call [phone].)  Call your clinic if there's any change in your health. This includes signs of a cold or flu (sore throat, runny nose, cough, rash, fever). It also includes a scrape or scratch near the surgery site.  If you have questions on the day of surgery, call your hospital or surgery center.  Eating and drinking guidelines  For your safety: Unless your surgeon tells you otherwise,  follow the guidelines below.  Eat and drink as usual until 8 hours before you arrive for surgery. After that, no food or milk.  Drink clear liquids until 2 hours before you arrive. These are liquids you can see through, like water, Gatorade, and Propel Water. They also include plain black coffee and tea (no cream or milk), candy, and breath mints. You can spit out gum when you arrive.  If you drink alcohol: Stop drinking it the night before surgery.  If your care team tells you to take medicine on the morning of surgery, it's okay to take it with a sip of water.  Preventing infection  Shower or bathe the night before and morning of your surgery. Follow the instructions your clinic gave you. (If no instructions, use regular soap.)  Don't shave or clip hair near your surgery site. We'll remove the hair if needed.  Don't smoke or vape the morning of surgery. You may chew nicotine gum up to 2 hours before surgery. A nicotine patch is okay.  Note: Some surgeries require you to completely quit smoking and nicotine. Check with your surgeon.  Your care team will make every effort to keep you safe from infection. We will:  Clean our hands often with soap and water (or an alcohol-based hand rub).  Clean the skin at your surgery site with a special soap that kills germs.  Give you a special gown to keep you warm. (Cold raises the risk of infection.)  Wear special hair covers, masks, gowns and gloves during surgery.  Give antibiotic medicine, if prescribed. Not all surgeries need antibiotics.  What to bring on the day of surgery  Photo ID and insurance card  Copy of your health care directive, if you have one  Glasses and hearing aids (bring cases)  You can't wear contacts during surgery  Inhaler and eye drops, if you use them (tell us about these when you arrive)  CPAP machine or breathing device, if you use them  A few personal items, if spending the night  If you have . . .  A pacemaker, ICD (cardiac defibrillator) or other  implant: Bring the ID card.  An implanted stimulator: Bring the remote control.  A legal guardian: Bring a copy of the certified (court-stamped) guardianship papers.  Please remove any jewelry, including body piercings. Leave jewelry and other valuables at home.  If you're going home the day of surgery  You must have a responsible adult drive you home. They should stay with you overnight as well.  If you don't have someone to stay with you, and you aren't safe to go home alone, we may keep you overnight. Insurance often won't pay for this.  After surgery  If it's hard to control your pain or you need more pain medicine, please call your surgeon's office.  Questions?   If you have any questions for your care team, list them here: _________________________________________________________________________________________________________________________________________________________________________ ____________________________________ ____________________________________ ____________________________________  For informational purposes only. Not to replace the advice of your health care provider. Copyright   2003, 2019 [facility]. All rights reserved. Clinically reviewed by Che Chaudhary MD. [employer] [id number] - REV 12/22.

## 2024-04-16 NOTE — H&P (VIEW-ONLY)
Preoperative Evaluation  [facility]  [address]  [address]  [place] [address]  Phone: [phone]  Fax: [phone]  Primary Provider: Isadora Cobb  Pre-op Performing Provider: ROSITA DAMIAN  Apr 16, 2024       Katarina is a 44 year old, presenting for the following:  Pre-Op Exam        4/16/2024    10:36 AM   Additional Questions   Roomed by Ana Maria Verma     Surgical Information  Surgery/Procedure: ESOPHAGOGASTRODUODENOSCOPY, WITH BIOPSY   Surgery Location:  formerly [facility] OR   Surgeon: Jayce Chacko,    Surgery Date: 5/2/2024   Time of Surgery: 2:55 PM   Where patient plans to recover: At home with family  Fax number for surgical facility: Note does not need to be faxed, will be available electronically in Epic.    Assessment & Plan     The proposed surgical procedure is considered LOW risk.    (Z01.818) Preop general physical exam  (primary encounter diagnosis)    (K21.9) Gastroesophageal reflux disease without esophagitis    (K44.9) Hiatal hernia    (D50.9) Iron deficiency anemia, unspecified iron deficiency anemia type  Comment:   Plan: anemia 2/2 malabsorption (prior tova en y gastric bypass).  stable  Lab Results   Component Value Date    HGB 9.3 04/02/2024        - No identified additional risk factors other than previously addressed        Recommendation  APPROVAL GIVEN to proceed with proposed procedure, without further diagnostic evaluation.          Subjective       HPI related to upcoming procedure: Katarina Mcclain is a 44 year old who presents for preoperative evaluation as requested by Dr Chacko prior to EGD for evaluation of GERD, hiatal hernia.         4/15/2024     4:29 PM   Preop Questions   1. Have you ever had a heart attack or stroke? No   2. Have you ever had surgery on your heart or blood vessels, such as a stent placement, a coronary artery bypass, or surgery on an artery in your head, neck, heart, or legs? No   3. Do you have  chest pain with activity? No   4. Do you have a history of  heart failure? No   5. Do you currently have a cold, bronchitis or symptoms of other infection? No   6. Do you have a cough, shortness of breath, or wheezing? No   7. Do you or anyone in your family have previous history of blood clots? No   8. Do you or does anyone in your family have a serious bleeding problem such as prolonged bleeding following surgeries or cuts? No   9. Have you ever had problems with anemia or been told to take iron pills? Hx of anemia for several years     10. Have you had any abnormal blood loss such as black, tarry or bloody stools, or abnormal vaginal bleeding? No   11. Have you ever had a blood transfusion? No   12. Are you willing to have a blood transfusion if it is medically needed before, during, or after your surgery? Yes   13. Have you or any of your relatives ever had problems with anesthesia? No   14. Do you have sleep apnea, excessive snoring or daytime drowsiness? No   15. Do you have any artifical heart valves or other implanted medical devices like a pacemaker, defibrillator, or continuous glucose monitor? No   16. Do you have artificial joints? No   17. Are you allergic to latex? No   18. Is there any chance that you may be pregnant? No             Patient Active Problem List    Diagnosis Date Noted    Gastroesophageal reflux disease without esophagitis 04/15/2024     Priority: Medium    Cervical high risk HPV (human papillomavirus) test positive 12/14/2022     Priority: Medium     08/23/17 NIL Pap, Neg HR HPV   12/7/22 NIL Pap, + HR HPV (not 16 or 18) Plan cotest in 1 year due 12/7/23 02/14/24 NIL Pap, + HR HPV (neg 16/18). [place] due by 5/14/24  3/25/24 COLP and ECC and polyp- Negative for dysplasia. Plan cotest in 1 year due by 3/25/25          Hypertrophy of bone 10/09/2019     Priority: Medium     Added automatically from request for surgery [id number]        Mild major depression (H) 10/25/2018     Priority: Medium     Vitamin D Deficiency      Priority: Medium     Created by Conversion  Replacement Utility updated for latest IMO load        Allergic Rhinitis      Priority: Medium     Created by Conversion  Replacement Utility updated for latest IMO load        Scheuermann's kyphosis 05/17/2016     Priority: Medium    Pars defect of lumbar spine 05/17/2016     Priority: Medium    Vitamin B12 Deficiency      Priority: Medium     Created by Conversion        Anemia      Priority: Medium     Created by Conversion        Generalized Anxiety Disorder      Priority: Medium     Created by Conversion        Oral Allergy Syndrome      Priority: Medium     Created by Conversion          Past Medical History:   Diagnosis Date    Allergic rhinitis     Created by Conversion Replacement Utility updated for latest IMO load     Allergic rhinitis, cause unspecified     seasonal allergies    Anemia     Created by Conversion     Anxiety     Asthma     Carbuncle and furuncle of trunk 04/2001    R breast    Cervical high risk HPV (human papillomavirus) test positive 12/14/2022 08/23/17 NIL Pap, Neg HR HPV  12/7/22 NIL Pap, + HR HPV (not 16 or 18) Plan cotest in 1 year due 12/7/23 12/14/22 Pt was advised.     Depressive disorder     Generalized anxiety disorder     Created by Conversion     Hidradenitis 04/1995    Hypertrophy of bone 10/09/2019    Added automatically from request for surgery [id number]     Mild major depression (H) 10/25/2018    Obesity, unspecified     Oral Allergy Syndrome     Created by Conversion     Pars defect of lumbar spine 05/17/2016    Personal history of tobacco use, presenting hazards to health     Scheuermann's kyphosis 05/17/2016    Vitamin B12 Deficiency     Created by Conversion     Vitamin D deficiency     Created by Conversion Replacement Utility updated for latest IMO load      Past Surgical History:   Procedure Laterality Date    CHOLECYSTECTOMY  2/27/2004    COLONOSCOPY      EYE SURGERY  1/15/2005    Lasik     ORTHOPEDIC SURGERY      Hammer Toe fix    REVISION AMADOU-EN-Y      ZZC GASTRIC BYPASS,OBESE<100CM AMADOU-EN-Y  2004    [facility]     Current Outpatient Medications   Medication Sig Dispense Refill    albuterol (PROAIR HFA/PROVENTIL HFA/VENTOLIN HFA) 108 (90 Base) MCG/ACT inhaler Inhale 2 puffs into the lungs every 6 hours as needed for shortness of breath, wheezing or cough 18 g 3    budesonide-formoterol (SYMBICORT) 160-4.5 MCG/ACT Inhaler Inhale 2 puffs into the lungs 2 times daily 10.2 g 3    cetirizine (ZYRTEC) 5 MG tablet Take 10 mg by mouth daily      citalopram (CELEXA) 20 MG tablet Take 1 tablet (20 mg) by mouth daily 90 tablet 3    multivitamin w/minerals (MULTI-VITAMIN) tablet Take 1 tablet by mouth daily      omeprazole (PRILOSEC) 20 MG DR capsule Take 1 capsule (20 mg) by mouth 2 times daily 180 capsule 1    tiotropium (SPIRIVA RESPIMAT) 2.5 MCG/ACT inhaler Inhale 2 puffs into the lungs daily 4 g 11       Allergies   Allergen Reactions    Other Environmental Allergy Other (See Comments)     Seasonal-ragweed, birch, dust mites    Oxycodone-Acetaminophen Nausea and Vomiting        Social History     Tobacco Use    Smoking status: Former     Current packs/day: 0.00     Average packs/day: 0.5 packs/day for 10.0 years (5.0 ttl pk-yrs)     Types: Cigarettes     Start date: 9/3/2014     Quit date: 10/1/2021     Years since quittin.5     Passive exposure: Past    Smokeless tobacco: Never    Tobacco comments:     no passive exposure   Substance Use Topics    Alcohol use: Yes     Comment: Alcoholic Drinks/day: weekends       History   Drug Use No         Review of Systems    Review of Systems  Constitutional, neuro, ENT, endocrine, pulmonary, cardiac, gastrointestinal, genitourinary, musculoskeletal, integument and psychiatric systems are negative, except as otherwise noted.    Objective    /80 (BP Location: Right arm, Patient Position: Sitting, Cuff Size: Adult Large)   Pulse 76   Temp  "98  F (36.7  C) (Oral)   Resp 15   Ht 1.676 m (5' 6\")   Wt 103 kg (227 lb)   SpO2 100%   BMI 36.64 kg/m     Estimated body mass index is 36.64 kg/m  as calculated from the following:    Height as of this encounter: 1.676 m (5' 6\").    Weight as of this encounter: 103 kg (227 lb).  Physical Exam  GENERAL: alert and no distress  EYES: Eyes grossly normal to inspection, PERRL and conjunctivae and sclerae normal  HENT: ear canals and TM's normal, nose and mouth without ulcers or lesions  NECK: no adenopathy, no asymmetry, masses, or scars  RESP: lungs clear to auscultation - no rales, rhonchi or wheezes  CV: regular rate and rhythm, normal S1 S2, no S3 or S4, no murmur, click or rub, no peripheral edema  ABDOMEN: soft, nontender, no hepatosplenomegaly, no masses and bowel sounds normal  MS: no gross musculoskeletal defects noted, no edema  SKIN: no suspicious lesions or rashes  NEURO: Normal strength and tone, mentation intact and speech normal  PSYCH: mentation appears normal, affect normal/bright    Recent Labs   Lab Test 04/02/24  1505 03/18/24  0825 02/14/24  0852 01/29/24  2048 11/20/23  1306 07/31/23  1008 12/07/22  1724   HGB 9.3* 9.2*  --  10.0* 10.3*   < >  --      --   --  316 344   < >  --    INR  --   --   --  1.08  --   --   --    NA  --   --   --  140 138   < >  --    POTASSIUM  --   --   --  3.4 3.8   < >  --    CR  --   --   --  0.62 0.52   < >  --    A1C  --   --  5.9*  --   --   --  5.6    < > = values in this interval not displayed.        Diagnostics  No labs were ordered during this visit.   No EKG required, no history of coronary heart disease, significant arrhythmia, peripheral arterial disease or other structural heart disease.    Revised Cardiac Risk Index (RCRI)  The patient has the following serious cardiovascular risks for perioperative complications:   - No serious cardiac risks = 0 points     RCRI Interpretation: 0 points: Class I (very low risk - 0.4% complication rate)     "     Signed Electronically by: Ronnie Carlson MD  Copy of this evaluation report is provided to requesting physician.

## 2024-04-17 ENCOUNTER — HOSPITAL ENCOUNTER (OUTPATIENT)
Dept: RADIOLOGY | Facility: CLINIC | Age: 44
Discharge: HOME | End: 2024-04-17
Attending: SURGERY | Admitting: SURGERY
Payer: COMMERCIAL

## 2024-04-17 DIAGNOSIS — K21.9 GASTROESOPHAGEAL REFLUX DISEASE WITHOUT ESOPHAGITIS: ICD-10-CM

## 2024-04-17 PROCEDURE — 74220 X-RAY XM ESOPHAGUS 1CNTRST: CPT

## 2024-04-26 ENCOUNTER — OFFICE VISIT (OUTPATIENT)
Dept: PULMONOLOGY | Facility: CLINIC | Age: 44
End: 2024-04-26
Attending: INTERNAL MEDICINE
Payer: COMMERCIAL

## 2024-04-26 VITALS
DIASTOLIC BLOOD PRESSURE: 84 MMHG | WEIGHT: 223.2 LBS | OXYGEN SATURATION: 97 % | BODY MASS INDEX: 36.03 KG/M2 | SYSTOLIC BLOOD PRESSURE: 131 MMHG | HEART RATE: 84 BPM

## 2024-04-26 DIAGNOSIS — J45.41 MODERATE PERSISTENT REACTIVE AIRWAY DISEASE WITH ACUTE EXACERBATION: Primary | ICD-10-CM

## 2024-04-26 PROCEDURE — 99214 OFFICE O/P EST MOD 30 MIN: CPT | Performed by: INTERNAL MEDICINE

## 2024-04-26 RX ORDER — PREDNISONE 20 MG/1
40 TABLET ORAL DAILY
Qty: 10 TABLET | Refills: 0 | Status: SHIPPED | OUTPATIENT
Start: 2024-04-26 | End: 2024-05-01

## 2024-04-26 ASSESSMENT — ASTHMA QUESTIONNAIRES
QUESTION_3 LAST FOUR WEEKS HOW OFTEN DID YOUR ASTHMA SYMPTOMS (WHEEZING, COUGHING, SHORTNESS OF BREATH, CHEST TIGHTNESS OR PAIN) WAKE YOU UP AT NIGHT OR EARLIER THAN USUAL IN THE MORNING: NOT AT ALL
ACT_TOTALSCORE: 23
ACUTE_EXACERBATION_TODAY: NO
QUESTION_1 LAST FOUR WEEKS HOW MUCH OF THE TIME DID YOUR ASTHMA KEEP YOU FROM GETTING AS MUCH DONE AT WORK, SCHOOL OR AT HOME: NONE OF THE TIME
QUESTION_4 LAST FOUR WEEKS HOW OFTEN HAVE YOU USED YOUR RESCUE INHALER OR NEBULIZER MEDICATION (SUCH AS ALBUTEROL): NOT AT ALL
QUESTION_5 LAST FOUR WEEKS HOW WOULD YOU RATE YOUR ASTHMA CONTROL: WELL CONTROLLED
ACT_TOTALSCORE: 23
QUESTION_2 LAST FOUR WEEKS HOW OFTEN HAVE YOU HAD SHORTNESS OF BREATH: ONCE OR TWICE A WEEK
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
## 2024-05-01 ENCOUNTER — ANESTHESIA EVENT (OUTPATIENT)
Dept: SURGERY | Facility: AMBULATORY SURGERY CENTER | Age: 44
End: 2024-05-01
Payer: COMMERCIAL

## 2024-05-02 ENCOUNTER — HOSPITAL ENCOUNTER (OUTPATIENT)
Facility: AMBULATORY SURGERY CENTER | Age: 44
Discharge: HOME | End: 2024-05-02
Attending: SURGERY
Payer: COMMERCIAL

## 2024-05-02 ENCOUNTER — ANESTHESIA (OUTPATIENT)
Dept: SURGERY | Facility: AMBULATORY SURGERY CENTER | Age: 44
End: 2024-05-02
Payer: COMMERCIAL

## 2024-05-02 VITALS
BODY MASS INDEX: 35.36 KG/M2 | RESPIRATION RATE: 16 BRPM | SYSTOLIC BLOOD PRESSURE: 121 MMHG | HEART RATE: 79 BPM | DIASTOLIC BLOOD PRESSURE: 73 MMHG | OXYGEN SATURATION: 99 % | TEMPERATURE: 97.2 F | WEIGHT: 220 LBS | HEIGHT: 66 IN

## 2024-05-02 DIAGNOSIS — K21.9 GASTROESOPHAGEAL REFLUX DISEASE WITHOUT ESOPHAGITIS: ICD-10-CM

## 2024-05-02 LAB — UPPER GI ENDOSCOPY: NORMAL

## 2024-05-02 RX ORDER — PROPOFOL 10 MG/ML
INJECTION, EMULSION INTRAVENOUS PRN
Status: DISCONTINUED | OUTPATIENT
Start: 2024-05-02 | End: 2024-05-02

## 2024-05-02 RX ORDER — OXYCODONE HYDROCHLORIDE 10 MG/1
10 TABLET ORAL
Status: DISCONTINUED | OUTPATIENT
Start: 2024-05-02 | End: 2024-05-03 | Stop reason: HOSPADM

## 2024-05-02 RX ORDER — LIDOCAINE 40 MG/G
CREAM TOPICAL
Status: DISCONTINUED | OUTPATIENT
Start: 2024-05-02 | End: 2024-05-03 | Stop reason: HOSPADM

## 2024-05-02 RX ORDER — GLYCOPYRROLATE 0.2 MG/ML
INJECTION, SOLUTION INTRAMUSCULAR; INTRAVENOUS PRN
Status: DISCONTINUED | OUTPATIENT
Start: 2024-05-02 | End: 2024-05-02

## 2024-05-02 RX ORDER — ONDANSETRON 2 MG/ML
4 INJECTION INTRAMUSCULAR; INTRAVENOUS EVERY 30 MIN PRN
Status: DISCONTINUED | OUTPATIENT
Start: 2024-05-02 | End: 2024-05-03 | Stop reason: HOSPADM

## 2024-05-02 RX ORDER — LIDOCAINE HYDROCHLORIDE 20 MG/ML
INJECTION, SOLUTION INFILTRATION; PERINEURAL PRN
Status: DISCONTINUED | OUTPATIENT
Start: 2024-05-02 | End: 2024-05-02

## 2024-05-02 RX ORDER — DEXAMETHASONE SODIUM PHOSPHATE 4 MG/ML
4 INJECTION, SOLUTION INTRA-ARTICULAR; INTRALESIONAL; INTRAMUSCULAR; INTRAVENOUS; SOFT TISSUE
Status: DISCONTINUED | OUTPATIENT
Start: 2024-05-02 | End: 2024-05-03 | Stop reason: HOSPADM

## 2024-05-02 RX ORDER — ONDANSETRON 4 MG/1
4 TABLET, ORALLY DISINTEGRATING ORAL EVERY 30 MIN PRN
Status: DISCONTINUED | OUTPATIENT
Start: 2024-05-02 | End: 2024-05-03 | Stop reason: HOSPADM

## 2024-05-02 RX ORDER — NALOXONE HYDROCHLORIDE 0.4 MG/ML
0.1 INJECTION, SOLUTION INTRAMUSCULAR; INTRAVENOUS; SUBCUTANEOUS
Status: DISCONTINUED | OUTPATIENT
Start: 2024-05-02 | End: 2024-05-03 | Stop reason: HOSPADM

## 2024-05-02 RX ORDER — PROPOFOL 10 MG/ML
INJECTION, EMULSION INTRAVENOUS CONTINUOUS PRN
Status: DISCONTINUED | OUTPATIENT
Start: 2024-05-02 | End: 2024-05-02

## 2024-05-02 RX ORDER — ONDANSETRON 2 MG/ML
INJECTION INTRAMUSCULAR; INTRAVENOUS PRN
Status: DISCONTINUED | OUTPATIENT
Start: 2024-05-02 | End: 2024-05-02

## 2024-05-02 RX ORDER — OXYCODONE HYDROCHLORIDE 5 MG/1
5 TABLET ORAL
Status: DISCONTINUED | OUTPATIENT
Start: 2024-05-02 | End: 2024-05-03 | Stop reason: HOSPADM

## 2024-05-02 RX ORDER — SODIUM CHLORIDE, SODIUM LACTATE, POTASSIUM CHLORIDE, CALCIUM CHLORIDE 600; 310; 30; 20 MG/100ML; MG/100ML; MG/100ML; MG/100ML
INJECTION, SOLUTION INTRAVENOUS CONTINUOUS
Status: DISCONTINUED | OUTPATIENT
Start: 2024-05-02 | End: 2024-05-03 | Stop reason: HOSPADM

## 2024-05-02 RX ADMIN — PROPOFOL 20 MG: 10 INJECTION, EMULSION INTRAVENOUS at 13:03

## 2024-05-02 RX ADMIN — PROPOFOL 30 MG: 10 INJECTION, EMULSION INTRAVENOUS at 12:59

## 2024-05-02 RX ADMIN — GLYCOPYRROLATE 0.2 MG: 0.2 INJECTION, SOLUTION INTRAMUSCULAR; INTRAVENOUS at 12:55

## 2024-05-02 RX ADMIN — ONDANSETRON 4 MG: 2 INJECTION INTRAMUSCULAR; INTRAVENOUS at 13:04

## 2024-05-02 RX ADMIN — PROPOFOL 200 MCG/KG/MIN: 10 INJECTION, EMULSION INTRAVENOUS at 12:56

## 2024-05-02 RX ADMIN — PROPOFOL 20 MG: 10 INJECTION, EMULSION INTRAVENOUS at 13:01

## 2024-05-02 RX ADMIN — LIDOCAINE HYDROCHLORIDE 3 ML: 20 INJECTION, SOLUTION INFILTRATION; PERINEURAL at 12:55

## 2024-05-02 RX ADMIN — SODIUM CHLORIDE, SODIUM LACTATE, POTASSIUM CHLORIDE, CALCIUM CHLORIDE: 600; 310; 30; 20 INJECTION, SOLUTION INTRAVENOUS at 12:30

## 2024-05-02 RX ADMIN — PROPOFOL 50 MG: 10 INJECTION, EMULSION INTRAVENOUS at 12:56

## 2024-05-02 NOTE — INTERVAL H&P NOTE
"I have reviewed the surgical (or preoperative) H&P that is linked to this encounter, and examined the patient. There are no significant changes    Clinical Conditions Present on Arrival:  Clinically Significant Risk Factors Present on Admission                  # Obesity: Estimated body mass index is 35.51 kg/m  as calculated from the following:    Height as of this encounter: 1.676 m (5' 6\").    Weight as of this encounter: 99.8 kg (220 lb).     Plan for EGD    Derrick Chacko [facility]  [phone]    "

## 2024-05-02 NOTE — ANESTHESIA CARE TRANSFER NOTE
Patient: Katarina Mcclain    Procedure: Procedure(s):  ESOPHAGOGASTRODUODENOSCOPY, WITH BIOPSY       Diagnosis: Gastroesophageal reflux disease without esophagitis [K21.9]  Diagnosis Additional Information: No value filed.    Anesthesia Type:   MAC     Note:    Oropharynx: oropharynx clear of all foreign objects and spontaneously breathing  Level of Consciousness: drowsy  Oxygen Supplementation: face mask  Level of Supplemental Oxygen (L/min / FiO2): 8  Independent Airway: airway patency satisfactory and stable  Dentition: dentition unchanged  Vital Signs Stable: post-procedure vital signs reviewed and stable  Report to RN Given: handoff report given  Patient transferred to: Phase II    Handoff Report: Identifed the Patient, Identified the Reponsible Provider, Reviewed the pertinent medical history, Discussed the surgical course, Reviewed Intra-OP anesthesia mangement and issues during anesthesia, Set expectations for post-procedure period and Allowed opportunity for questions and acknowledgement of understanding      Vitals:  Vitals Value Taken Time   /75 05/02/24 1314   Temp 97.2  F (36.2  C) 05/02/24 1312   Pulse 78 05/02/24 1313   Resp 16 05/02/24 1312   SpO2 100 % 05/02/24 1313   Vitals shown include unfiled device data.    Electronically Signed By: HARSH Medina CRNA  May 2, 2024  1:16 PM

## 2024-05-02 NOTE — ANESTHESIA PREPROCEDURE EVALUATION
Anesthesia Pre-Procedure Evaluation    Patient: Katarina Mcclain   MRN: [id number] : 1980        Procedure : Procedure(s):  ESOPHAGOGASTRODUODENOSCOPY, WITH BIOPSY          Past Medical History:   Diagnosis Date     Allergic rhinitis     Created by Conversion Replacement Utility updated for latest IMO load      Allergic rhinitis, cause unspecified     seasonal allergies     Anemia     Created by Conversion      Anxiety      Asthma      Carbuncle and furuncle of trunk 2001    R breast     Cervical high risk HPV (human papillomavirus) test positive 2022 NIL Pap, Neg HR HPV  22 NIL Pap, + HR HPV (not 16 or 18) Plan cotest in 1 year due 23 Pt was advised.      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     Obesity, unspecified      Oral Allergy Syndrome     Created by Conversion      Pars defect of lumbar spine 2016     Personal history of tobacco use, presenting hazards to health      Scheuermann's kyphosis 2016     Vitamin B12 Deficiency     Created by Conversion      Vitamin D deficiency     Created by Conversion Replacement Utility updated for latest IMO load       Past Surgical History:   Procedure Laterality Date     CHOLECYSTECTOMY  2004     COLONOSCOPY       EYE SURGERY  1/15/2005    Lasik     ORTHOPEDIC SURGERY      Hammer Toe fix     REVISION AMADOU-EN-Y       ZZC GASTRIC BYPASS,OBESE<100CM AMADOU-EN-Y  2004    [facility]      Allergies   Allergen Reactions     Other Environmental Allergy Other (See Comments)     Seasonal-ragweed, birch, dust mites     Oxycodone-Acetaminophen Nausea and Vomiting      Social History     Tobacco Use     Smoking status: Former      Current packs/day: 0.00     Average packs/day: 0.5 packs/day for 10.0 years (5.0 ttl pk-yrs)     Types: Cigarettes     Start date: 9/3/2014     Quit date: 10/1/2021     Years since quittin.5     Passive exposure: Past     Smokeless tobacco: Never     Tobacco comments:     no passive exposure   Substance Use Topics     Alcohol use: Yes     Comment: weekends      Wt Readings from Last 1 Encounters:   24 99.8 kg (220 lb)        Anesthesia Evaluation            ROS/MED HX  ENT/Pulmonary:     (+)                      asthma                  Neurologic:       Cardiovascular:       METS/Exercise Tolerance:     Hematologic:       Musculoskeletal:       GI/Hepatic:     (+) GERD,     hiatal hernia,              Renal/Genitourinary:       Endo:       Psychiatric/Substance Use:       Infectious Disease:       Malignancy:       Other:          Physical Exam    Airway        Mallampati: II   TM distance: > 3 FB   Neck ROM: full     Respiratory Devices and Support         Dental           Cardiovascular   cardiovascular exam normal          Pulmonary   pulmonary exam normal            OUTSIDE LABS:  CBC:   Lab Results   Component Value Date    WBC 8.2 2024    WBC 11.7 (H) 2024    HGB 9.3 (L) 2024    HGB 9.2 (L) 2024    HCT 31.1 (L) 2024    HCT 34.0 (L) 2024     2024     2024     BMP:   Lab Results   Component Value Date     2024     2023    POTASSIUM 3.4 2024    POTASSIUM 3.8 2023    CHLORIDE 103 2024    CHLORIDE 102 2023    CO2 25 2024    CO2 23 2023    BUN 9.2 2024    BUN 8.0 2023    CR 0.62 2024    CR 0.52 2023     (H) 2024     (H) 2023     COAGS:   Lab Results   Component Value Date    INR 1.08 2024     POC:   Lab Results   Component Value Date    HCG Negative 2024     HEPATIC:   Lab Results   Component Value Date    ALBUMIN 4.3  "01/29/2024    PROTTOTAL 7.5 01/29/2024    ALT 23 01/29/2024    AST 21 01/29/2024    ALKPHOS 95 01/29/2024    BILITOTAL 0.2 01/29/2024     OTHER:   Lab Results   Component Value Date    A1C 5.9 (H) 02/14/2024    CHARLENE 9.2 01/29/2024    MAG 2.1 01/29/2024    TSH 1.91 01/29/2024    T4 1.00 12/15/2021    CRP 0.5 10/13/2021    SED 12 10/13/2021       Anesthesia Plan    ASA Status:  2    NPO Status:  NPO status:: Patient ate small amount of rice and egg at 630am. OK to proceed at 1230p.   Anesthesia Type: MAC.              Consents    Anesthesia Plan(s) and associated risks, benefits, and realistic alternatives discussed. Questions answered and patient/representative(s) expressed understanding.     - Discussed: Risks, Benefits and Alternatives for BOTH SEDATION and the PROCEDURE were discussed     - Discussed with:  Patient      - Extended Intubation/Ventilatory Support Discussed: No.      - Patient is DNR/DNI Status: No     Use of blood products discussed: No .     Postoperative Care    Pain management: IV analgesics, Oral pain medications.   PONV prophylaxis: Ondansetron (or other 5HT-3), Dexamethasone or Solumedrol     Comments:             Savage Eduardo DO    I have reviewed the pertinent notes and labs in the chart from the past 30 days and (re)examined the patient.  Any updates or changes from those notes are reflected in this note.              # Obesity: Estimated body mass index is 35.51 kg/m  as calculated from the following:    Height as of this encounter: 1.676 m (5' 6\").    Weight as of this encounter: 99.8 kg (220 lb).      "

## 2024-05-02 NOTE — ANESTHESIA POSTPROCEDURE EVALUATION
Patient: Katarina Mcclain    Procedure: Procedure(s):  ESOPHAGOGASTRODUODENOSCOPY, WITH BIOPSY       Anesthesia Type:  MAC    Note:  Disposition: Outpatient   Postop Pain Control: Uneventful            Sign Out: Well controlled pain   PONV: No   Neuro/Psych: Uneventful            Sign Out: Acceptable/Baseline neuro status   Airway/Respiratory: Uneventful            Sign Out: Acceptable/Baseline resp. status   CV/Hemodynamics: Uneventful            Sign Out: Acceptable CV status; No obvious hypovolemia; No obvious fluid overload   Other NRE: NONE   DID A NON-ROUTINE EVENT OCCUR? No    Event details/Postop Comments:  Patient recovering comfortably.        Last vitals:  Vitals Value Taken Time   /73 05/02/24 1345   Temp 97.2  F (36.2  C) 05/02/24 1312   Pulse 72 05/02/24 1358   Resp 16 05/02/24 1315   SpO2 99 % 05/02/24 1358   Vitals shown include unfiled device data.    Electronically Signed By: Savage Eduardo DO  May 2, 2024  2:09 PM

## 2024-05-02 NOTE — DISCHARGE INSTRUCTIONS
If you have any questions or concerns regarding your procedure, please contact Dr. Chacko, his office number is [phone].    [facility]   Adult Discharge Orders & Instructions     For 24 hours after surgery    Get plenty of rest.  A responsible adult must stay with you for at least 24 hours after you leave the hospital.   Do not drive or use heavy equipment.  If you have weakness or tingling, don't drive or use heavy equipment until this feeling goes away.  Do not drink alcohol.  Avoid strenuous or risky activities.  Ask for help when climbing stairs.   You may feel lightheaded.  IF so, sit for a few minutes before standing.  Have someone help you get up.   If you have nausea (feel sick to your stomach): Drink only clear liquids such as apple juice, ginger ale, broth or 7-Up.  Rest may also help.  Be sure to drink enough fluids.  Move to a regular diet as you feel able.  You may have a slight fever. Call the doctor if your fever is over 100 F (37.7 C) (taken under the tongue) or lasts longer than 24 hours.  You may have a dry mouth, a sore throat, muscle aches or trouble sleeping.  These should go away after 24 hours.  Do not make important or legal decisions.   Call your doctor for any of the followin.  Signs of infection (fever, growing tenderness at the surgery site, a large amount of drainage or bleeding, severe pain, foul-smelling drainage, redness, swelling).    2. It has been over 8 to 10 hours since surgery and you are still not able to urinate (pass water).    3.  Headache for over 24 hours.

## 2024-05-09 ENCOUNTER — VIRTUAL VISIT (OUTPATIENT)
Dept: SURGERY | Facility: CLINIC | Age: 44
End: 2024-05-09
Payer: COMMERCIAL

## 2024-05-09 ENCOUNTER — TELEPHONE (OUTPATIENT)
Dept: SURGERY | Facility: CLINIC | Age: 44
End: 2024-05-09

## 2024-05-09 DIAGNOSIS — K44.9 HIATAL HERNIA: Primary | ICD-10-CM

## 2024-05-09 RX ORDER — CEFAZOLIN SODIUM/WATER 2 G/20 ML
2 SYRINGE (ML) INTRAVENOUS
Status: CANCELLED | OUTPATIENT
Start: 2024-07-09

## 2024-05-09 RX ORDER — CEFAZOLIN SODIUM/WATER 2 G/20 ML
2 SYRINGE (ML) INTRAVENOUS SEE ADMIN INSTRUCTIONS
Status: CANCELLED | OUTPATIENT
Start: 2024-07-09

## 2024-05-09 NOTE — PROGRESS NOTES
"  The patient has been notified of following:     \"This telephone visit will be conducted via a call between you and your physician/provider. We have found that certain health care needs can be provided without the need for a physical exam.  This service lets us provide the care you need with a short phone conversation.  If a prescription is necessary we can send it directly to your pharmacy.  If lab work is needed we can place an order for that and you can then stop by our lab to have the test done at a later time.    Telephone visits are billed at different rates depending on your insurance coverage. During this emergency period, for some insurers they may be billed the same as an in-person visit.  Please reach out to your insurance provider with any questions.    If during the course of the call the physician/provider feels a telephone visit is not appropriate, you will not be charged for this service.\"    Patient has given verbal consent to a Telephone visit? Yes    What phone number would you like to be contacted at? [phone]    Patient would like to receive their AVS by [employer]        Telephone Start Time: 9:04 AM    Telephone End Time (time telephone stopped): 9:04 AM    Originating Patient Location (pt. Location): Home      Distant Location (provider location):  On-site    Distant Location (provider location):  [facility] [place]     I called and spoke with Katarina regarding the endoscopy.  She would like to pursue the surgical option fix hernia.  Will plan on hiatal hernia repair with LINX placement.  Risk and benefits of the procedure were explained detail she has consented to proceed.      Derrick Chacko DO FACS  [facility]  [phone]         "

## 2024-05-09 NOTE — LETTER
Pre-op Physical: 6/24/2024 at 4:30 pm with Isadora Cobb NP at the [facility]    Surgery Date: 7/9/2024     Location: [place] [id number] [place] [id number]    Approximate Arrival Time: 10:30 am  (Unless instructed differently by the pre-op call nurse)     Post op Appointment: 8/1/2024 at 9:00 am with  Dr. Chacko  [facility]   [facility], [place] [id number].       Pre-Surgical Tasks:     Schedule a pre-op physical with your primary care doctor if not internal to [facility].  If internal, we have scheduled this.   The pre-op physical must be 10-30 days before surgery and since it is required by anesthesia, your surgery will be cancelled if it's not done.      Review all medications with your primary care or prescribing physician; they will advise you which meds to stop and when, and when you can resume taking.  Certain medications like blood thinners and weight loss medications need to be stopped in advance of surgery to proceed safely.      Blood thinners including but not exclusive to drugs like Xarelto, Eliquis, Warfarin and Aspirin, should be stopped five days before surgery, if your prescribing provider agrees. Follow your provider's advice on stopping blood thinners because they know you best.  If you are unsure if your medication is a blood thinner, ask your prescribing provider.    Weight loss medications: There are multiple medications being used for weight management and diabetes today, and the list is growing.  Phentermine, Ozempic, Wegovy, Trulicity, and other similar medications need to be stopped one week before surgery to avoid being cancelled.  Victoza and Saxenda can be continued longer but must be stopped one full day before surgery.  Please ask your prescribing provider for advice.    Diabetic medications: in addition to the medications talked about above that are used for either weight  loss or diabetes, some people are on insulin that may require adjustment.  Please discuss managing diabetic medications with your prescribing doctor as these medications may require modification prior to surgery.     Please shower the evening before and morning of surgery with Hibiclens soap.  Any [facility] can provide this to you at no cost, or it can be found at your local pharmacy.     Fasting instructions will be provided by the pre-op nurse who will call you 1-3 days before surgery.  Typically, we advise normal food up to 8 hours before you arrive for surgery. Clear liquids only from then until 2 hours before you arrive surgery, then nothing at all by mouth.  The nurse will review your specific instructions with you at the call.      Smoking impacts your body's ability to heal properly so we advise patients to quit if possible before surgery.  Plastic Surgery patients are required to be nicotine free for at least 8 weeks before surgery.      You will need an adult to drive you home and stay with you 24 hours after surgery. Public transportation or Medical Van Services are not permitted.    Visitor restrictions are subject to change, please verify with the pre-op nurse when they call how many people are permitted to accompany you.    We always encourage you to notify your insurance any time you have medical tests or procedures scheduled including surgery. The number is usually right on the back of your insurance card. To obtain pricing for surgery, please call [facility] Cost of Care at [phone] or email [email or web address].        Call our office if you have any questions! Thank you!     Lady Morgan MA  [facility]   (General Surgery/ ENT/ Plastics)  Direct Office: [phone]

## 2024-05-09 NOTE — LETTER
"    5/9/2024         RE: Katarina Mcclain  [address] [address]        Dear Colleague,    Thank you for referring your patient, Katarina Mcclain, to the [facility]. Please see a copy of my visit note below.      The patient has been notified of following:     \"This telephone visit will be conducted via a call between you and your physician/provider. We have found that certain health care needs can be provided without the need for a physical exam.  This service lets us provide the care you need with a short phone conversation.  If a prescription is necessary we can send it directly to your pharmacy.  If lab work is needed we can place an order for that and you can then stop by our lab to have the test done at a later time.    Telephone visits are billed at different rates depending on your insurance coverage. During this emergency period, for some insurers they may be billed the same as an in-person visit.  Please reach out to your insurance provider with any questions.    If during the course of the call the physician/provider feels a telephone visit is not appropriate, you will not be charged for this service.\"    Patient has given verbal consent to a Telephone visit? Yes    What phone number would you like to be contacted at? [phone]    Patient would like to receive their AVS by [email or web address]        Telephone Start Time: 9:04 AM    Telephone End Time (time telephone stopped): 9:04 AM    Originating Patient Location (pt. Location): Home      Distant Location (provider location):  On-site    Distant Location (provider location):  [facility]     I called and spoke with Katarina regarding the endoscopy.  She would like to pursue the surgical option fix hernia.  Will plan on hiatal hernia repair with LINX placement.  Risk and benefits of the procedure were explained detail she has consented to " proceed.      Derrick Chacko DO [facility]  [phone]             Again, thank you for allowing me to participate in the care of your patient.        Sincerely,        Jayce Chacko, DO

## 2024-05-13 NOTE — TELEPHONE ENCOUNTER
Spoke with patient today regarding surgery scheduling      Went over details/instructions.    Surgery Letter sent via [employer]  (Please see LETTERS TAB in chart to retrieve a copy of this letter)

## 2024-06-24 ENCOUNTER — OFFICE VISIT (OUTPATIENT)
Dept: PEDIATRICS | Facility: CLINIC | Age: 44
End: 2024-06-24
Payer: COMMERCIAL

## 2024-06-24 VITALS
HEIGHT: 66 IN | BODY MASS INDEX: 35.58 KG/M2 | WEIGHT: 221.4 LBS | RESPIRATION RATE: 16 BRPM | SYSTOLIC BLOOD PRESSURE: 110 MMHG | DIASTOLIC BLOOD PRESSURE: 70 MMHG | OXYGEN SATURATION: 98 % | HEART RATE: 68 BPM | TEMPERATURE: 97.9 F

## 2024-06-24 DIAGNOSIS — F41.1 GENERALIZED ANXIETY DISORDER: ICD-10-CM

## 2024-06-24 DIAGNOSIS — D50.9 IRON DEFICIENCY ANEMIA, UNSPECIFIED IRON DEFICIENCY ANEMIA TYPE: ICD-10-CM

## 2024-06-24 DIAGNOSIS — E66.812 CLASS 2 SEVERE OBESITY DUE TO EXCESS CALORIES WITH SERIOUS COMORBIDITY AND BODY MASS INDEX (BMI) OF 35.0 TO 35.9 IN ADULT (H): ICD-10-CM

## 2024-06-24 DIAGNOSIS — F33.0 MILD EPISODE OF RECURRENT MAJOR DEPRESSIVE DISORDER (H): ICD-10-CM

## 2024-06-24 DIAGNOSIS — Z01.818 PRE-OPERATIVE EXAMINATION: Primary | ICD-10-CM

## 2024-06-24 DIAGNOSIS — K44.9 HIATAL HERNIA: ICD-10-CM

## 2024-06-24 DIAGNOSIS — E66.01 CLASS 2 SEVERE OBESITY DUE TO EXCESS CALORIES WITH SERIOUS COMORBIDITY AND BODY MASS INDEX (BMI) OF 35.0 TO 35.9 IN ADULT (H): ICD-10-CM

## 2024-06-24 LAB
ERYTHROCYTE [DISTWIDTH] IN BLOOD BY AUTOMATED COUNT: 21.8 % (ref 10–15)
HCT VFR BLD AUTO: 40.3 % (ref 35–47)
HGB BLD-MCNC: 12.7 G/DL (ref 11.7–15.7)
MCH RBC QN AUTO: 27.4 PG (ref 26.5–33)
MCHC RBC AUTO-ENTMCNC: 31.5 G/DL (ref 31.5–36.5)
MCV RBC AUTO: 87 FL (ref 78–100)
PLATELET # BLD AUTO: 235 10E3/UL (ref 150–450)
RBC # BLD AUTO: 4.64 10E6/UL (ref 3.8–5.2)
WBC # BLD AUTO: 7.7 10E3/UL (ref 4–11)

## 2024-06-24 PROCEDURE — 83550 IRON BINDING TEST: CPT | Performed by: NURSE PRACTITIONER

## 2024-06-24 PROCEDURE — 80048 BASIC METABOLIC PNL TOTAL CA: CPT | Performed by: NURSE PRACTITIONER

## 2024-06-24 PROCEDURE — 83540 ASSAY OF IRON: CPT | Performed by: NURSE PRACTITIONER

## 2024-06-24 PROCEDURE — 99214 OFFICE O/P EST MOD 30 MIN: CPT | Performed by: NURSE PRACTITIONER

## 2024-06-24 PROCEDURE — 36415 COLL VENOUS BLD VENIPUNCTURE: CPT | Performed by: NURSE PRACTITIONER

## 2024-06-24 PROCEDURE — 85027 COMPLETE CBC AUTOMATED: CPT | Performed by: NURSE PRACTITIONER

## 2024-06-24 PROCEDURE — 82728 ASSAY OF FERRITIN: CPT | Performed by: NURSE PRACTITIONER

## 2024-06-24 ASSESSMENT — PAIN SCALES - GENERAL: PAINLEVEL: NO PAIN (0)

## 2024-06-24 NOTE — PROGRESS NOTES
Preoperative Evaluation  [facility] [place]  [address]  [address]  [place] [address]  Phone: [phone]  Fax: [phone]  Primary Provider: Isadora Cobb NP  Pre-op Performing Provider: Isadora Cobb NP  Jun 24, 2024 6/24/2024   Surgical Information   What procedure is being done? paraesophageal hernia repair with mesh and linx device placement    Facility or Hospital where procedure/surgery will be performed: [facility]   Who is doing the procedure / surgery? Jayce Mera   Date of surgery / procedure: 7/9/24   Time of surgery / procedure: Yet to be determined   Where do you plan to recover after surgery? at home alone        Fax number for surgical facility: Note does not need to be faxed, will be available electronically in Epic.    Assessment & Plan     The proposed surgical procedure is considered LOW risk.    Pre-operative examination  Cleared for procedure.  - CBC with platelets  - Basic metabolic panel  (Ca, Cl, CO2, Creat, Gluc, K, Na, BUN)    Hiatal hernia  Stable and followed by general surgery.  Cleared for procedure.    Generalized anxiety disorder  Mild episode of recurrent major depressive disorder (H24)  Stable.  Continue current medications    Class 2 severe obesity due to excess calories with serious comorbidity and body mass index (BMI) of 35.0 to 35.9 in adult (H)  Stable.  Discussed the importance of weight control to help with hiatal hernia.  Encouraged increasing daily physical activity and following a well balanced diet.  Patient is working on this.    Iron deficiency anemia, unspecified iron deficiency anemia type  Improving.  Continue with iron 325mg daily.  - Iron and iron binding capacity  - Ferritin        Risks and Recommendations  The patient has the following additional risks and recommendations for perioperative complications:  Pulmonary:    - well controlled asthma.  Anemia/Bleeding/Clotting:    - Anemia and does not require  treatment prior to surgery. Monitor hemoglobin postoperatively    Antiplatelet or Anticoagulation Medication Instructions   - Patient is on no antiplatelet or anticoagulation medications.    Additional Medication Instructions   - SSRIs, SNRIs, TCAs, Antipsychotics: Continue without modification.    - LABA, inhaled corticosteroid, long-acting anticholinergics: Continue without modification.   - rescue Inhaler: Continue PRN. Bring to hospital on the day of surgery.   - Hold multivitamin   - Continue taking omeprazole.   - Hold cetirizine day of procedure    Recommendation  Approval given to proceed with proposed procedure, without further diagnostic evaluation.    Scar Bray is a 44 year old, presenting for the following:    Pre-Op Exam          6/24/2024     4:05 PM   Additional Questions   Roomed by Laure DECKER   Accompanied by Self         6/24/2024     4:05 PM   Patient Reported Additional Medications   Patient reports taking the following new medications No     HPI related to upcoming procedure: GERD and hiatal hernia        6/24/2024   Pre-Op Questionnaire   Have you ever had a heart attack or stroke? No   Have you ever had surgery on your heart or blood vessels, such as a stent placement, a coronary artery bypass, or surgery on an artery in your head, neck, heart, or legs? No   Do you have chest pain with activity? No   Do you have a history of heart failure? No   Do you currently have a cold, bronchitis or symptoms of other infection? No   Do you have a cough, shortness of breath, or wheezing? (!) YES due to asthma.   Do you or anyone in your family have previous history of blood clots? No   Do you or does anyone in your family have a serious bleeding problem such as prolonged bleeding following surgeries or cuts? No   Have you ever had problems with anemia or been told to take iron pills? (!) YES--takes iron daily.   Have you had any abnormal blood loss such as black, tarry or bloody stools, or abnormal  vaginal bleeding? No   Have you ever had a blood transfusion? No   Are you willing to have a blood transfusion if it is medically needed before, during, or after your surgery? Yes   Have you or any of your relatives ever had problems with anesthesia? No   Do you have sleep apnea, excessive snoring or daytime drowsiness? No   Do you have any artifical heart valves or other implanted medical devices like a pacemaker, defibrillator, or continuous glucose monitor? No   Do you have artificial joints? No   Are you allergic to latex? No        Health Care Directive  Patient does not have a Health Care Directive or Living Will: Discussed advance care planning with patient; however, patient declined at this time.    Preoperative Review of    reviewed - no record of controlled substances prescribed.      Status of Chronic Conditions:  ANEMIA - Patient has a recent history of moderate-severe anemia, which has not been symptomatic. Work up to date has revealed 4/2024. Treatment has been well tolerated  takes iron 325mg daily.    ASTHMA - Patient has a longstanding history of moderate-severe Asthma . Patient has been doing well overall noting intermittent SOB and continues on medication regimen consisting of Symbicort, albuterol, and tiotropium without adverse reactions or side effects.     DEPRESSION - Patient has a long history of Depression of moderate severity requiring medication for control with recent symptoms being stable. Current symptoms of depression include none.     Patient Active Problem List    Diagnosis Date Noted    Class 2 severe obesity due to excess calories with serious comorbidity in adult (H) 04/16/2024     Priority: Medium    Gastroesophageal reflux disease without esophagitis 04/15/2024     Priority: Medium    Cervical high risk HPV (human papillomavirus) test positive 12/14/2022     Priority: Medium     08/23/17 NIL Pap, Neg HR HPV   12/7/22 NIL Pap, + HR HPV (not 16 or 18) Plan cotest in 1 year  due 12/7/23 02/14/24 NIL Pap, + HR HPV (neg 16/18). [place] due by 5/14/24  3/25/24 COLP and ECC and polyp- Negative for dysplasia. Plan cotest in 1 year due by 3/25/25          Hypertrophy of bone 10/09/2019     Priority: Medium     Added automatically from request for surgery [id number]        Mild major depression (H) 10/25/2018     Priority: Medium    Vitamin D Deficiency      Priority: Medium     Created by Conversion  Replacement Utility updated for latest IMO load        Allergic Rhinitis      Priority: Medium     Created by Conversion  Replacement Utility updated for latest IMO load        Scheuermann's kyphosis 05/17/2016     Priority: Medium    Pars defect of lumbar spine 05/17/2016     Priority: Medium    Vitamin B12 Deficiency      Priority: Medium     Created by Conversion        Anemia      Priority: Medium     Created by Conversion        Generalized Anxiety Disorder      Priority: Medium     Created by Conversion        Oral Allergy Syndrome      Priority: Medium     Created by Conversion          Past Medical History:   Diagnosis Date    Allergic rhinitis     Created by Conversion Replacement Utility updated for latest IMO load     Allergic rhinitis, cause unspecified     seasonal allergies    Anemia     Created by Conversion     Anxiety     Asthma     Carbuncle and furuncle of trunk 04/2001    R breast    Cervical high risk HPV (human papillomavirus) test positive 12/14/2022 08/23/17 NIL Pap, Neg HR HPV  12/7/22 NIL Pap, + HR HPV (not 16 or 18) Plan cotest in 1 year due 12/7/23 12/14/22 Pt was advised.     Depressive disorder     Dyspnea on exertion     Gastroesophageal reflux disease     Generalized anxiety disorder     Created by Conversion     Hiatal hernia     Hidradenitis 04/1995    Hypertrophy of bone 10/09/2019    Added automatically from request for surgery [id number]     Mild major depression (H) 10/25/2018    Obesity, unspecified     Oral Allergy Syndrome     Created by Conversion     Pars  defect of lumbar spine 05/17/2016    Personal history of tobacco use, presenting hazards to health     Scheuermann's kyphosis 05/17/2016    Vitamin B12 Deficiency     Created by Conversion     Vitamin D deficiency     Created by Conversion Replacement Utility updated for latest IMO load      Past Surgical History:   Procedure Laterality Date    CHOLECYSTECTOMY  2/27/2004    COLONOSCOPY      ESOPHAGOSCOPY, GASTROSCOPY, DUODENOSCOPY (EGD), COMBINED N/A 5/2/2024    Procedure: ESOPHAGOGASTRODUODENOSCOPY, WITH BIOPSY;  Surgeon: Jayce Chacko DO;  Location: [facility]    EYE SURGERY  1/15/2005    [facility]    ORTHOPEDIC SURGERY      Hammer Toe fix    REVISION AMADOU-EN-Y      ZZC GASTRIC BYPASS,OBESE<100CM AMADOU-EN-Y  02/27/2004    [facility]     Current Outpatient Medications   Medication Sig Dispense Refill    albuterol (PROAIR HFA/PROVENTIL HFA/VENTOLIN HFA) 108 (90 Base) MCG/ACT inhaler Inhale 2 puffs into the lungs every 6 hours as needed for shortness of breath, wheezing or cough 18 g 3    budesonide-formoterol (SYMBICORT) 160-4.5 MCG/ACT Inhaler Inhale 2 puffs into the lungs 2 times daily 10.2 g 3    cetirizine (ZYRTEC) 5 MG tablet Take 10 mg by mouth daily      citalopram (CELEXA) 20 MG tablet Take 1 tablet (20 mg) by mouth daily 90 tablet 3    multivitamin w/minerals (MULTI-VITAMIN) tablet Take 1 tablet by mouth daily      omeprazole (PRILOSEC) 20 MG DR capsule Take 1 capsule (20 mg) by mouth 2 times daily 180 capsule 1    tiotropium (SPIRIVA RESPIMAT) 2.5 MCG/ACT inhaler Inhale 2 puffs into the lungs daily 4 g 11       Allergies   Allergen Reactions    Other Environmental Allergy Other (See Comments)     Seasonal-ragweed, birch, dust mites    Oxycodone-Acetaminophen Nausea and Vomiting        Social History     Tobacco Use    Smoking status: Former     Current packs/day: 0.00     Average packs/day: 0.5 packs/day for 10.0 years (5.0 ttl pk-yrs)     Types: Cigarettes     Start date: 9/3/2014     " Quit date: 10/1/2021     Years since quittin.7     Passive exposure: Past    Smokeless tobacco: Never    Tobacco comments:     no passive exposure   Substance Use Topics    Alcohol use: Yes     Comment: weekends     History   Drug Use No             Review of Systems  Constitutional, HEENT, cardiovascular, pulmonary, gi and gu systems are negative, except as otherwise noted.    Objective    /70 (BP Location: Right arm, Patient Position: Sitting, Cuff Size: Adult Large)   Pulse 68   Temp 97.9  F (36.6  C) (Tympanic)   Resp 16   Ht 1.68 m (5' 6.14\")   Wt 100.4 kg (221 lb 6.4 oz)   SpO2 98%   BMI 35.58 kg/m     Estimated body mass index is 35.58 kg/m  as calculated from the following:    Height as of this encounter: 1.68 m (5' 6.14\").    Weight as of this encounter: 100.4 kg (221 lb 6.4 oz).    Wt Readings from Last 4 Encounters:   24 100.4 kg (221 lb 6.4 oz)   24 99.8 kg (220 lb)   24 101.2 kg (223 lb 3.2 oz)   24 103 kg (227 lb)        Physical Exam  GENERAL: alert and no distress  EYES: Eyes grossly normal to inspection, PERRL and conjunctivae and sclerae normal  HENT: ear canals and TM's normal, nose and mouth without ulcers or lesions  NECK: no adenopathy, no asymmetry, masses, or scars  RESP: lungs clear to auscultation - no rales, rhonchi or wheezes  CV: regular rate and rhythm, normal S1 S2, no S3 or S4, no murmur, click or rub, no peripheral edema  ABDOMEN: soft, nontender, no hepatosplenomegaly, no masses and bowel sounds normal  MS: no gross musculoskeletal defects noted, no edema  SKIN: no suspicious lesions or rashes  NEURO: Normal strength and tone, mentation intact and speech normal  PSYCH: mentation appears normal, affect normal/bright    Recent Labs   Lab Test 24  1505 24  0825 24  0852 24  2048 23  1306   HGB 9.3* 9.2*  --  10.0* 10.3*     --   --  316 344   INR  --   --   --  1.08  --    NA  --   --   --  140 138 "   POTASSIUM  --   --   --  3.4 3.8   CR  --   --   --  0.62 0.52   A1C  --   --  5.9*  --   --         Diagnostics  Recent Results (from the past 48 hour(s))   CBC with platelets    Collection Time: 06/24/24  4:48 PM   Result Value Ref Range    WBC Count 7.7 4.0 - 11.0 10e3/uL    RBC Count 4.64 3.80 - 5.20 10e6/uL    Hemoglobin 12.7 11.7 - 15.7 g/dL    Hematocrit 40.3 35.0 - 47.0 %    MCV 87 78 - 100 fL    MCH 27.4 26.5 - 33.0 pg    MCHC 31.5 31.5 - 36.5 g/dL    RDW 21.8 (H) 10.0 - 15.0 %    Platelet Count 235 150 - 450 10e3/uL   Basic metabolic panel  (Ca, Cl, CO2, Creat, Gluc, K, Na, BUN)    Collection Time: 06/24/24  4:48 PM   Result Value Ref Range    Sodium 137 135 - 145 mmol/L    Potassium 4.6 3.4 - 5.3 mmol/L    Chloride 104 98 - 107 mmol/L    Carbon Dioxide (CO2) 24 22 - 29 mmol/L    Anion Gap 9 7 - 15 mmol/L    Urea Nitrogen 16.3 6.0 - 20.0 mg/dL    Creatinine 0.60 0.51 - 0.95 mg/dL    GFR Estimate >90 >60 mL/min/1.73m2    Calcium 8.8 8.6 - 10.0 mg/dL    Glucose 89 70 - 99 mg/dL   Iron and iron binding capacity    Collection Time: 06/24/24  4:48 PM   Result Value Ref Range    Iron 98 37 - 145 ug/dL    Iron Binding Capacity 347 240 - 430 ug/dL    Iron Sat Index 28 15 - 46 %   Ferritin    Collection Time: 06/24/24  4:48 PM   Result Value Ref Range    Ferritin 30 6 - 175 ng/mL      No EKG required for low risk surgery (cataract, skin procedure, breast biopsy, etc).    Revised Cardiac Risk Index (RCRI)  The patient has the following serious cardiovascular risks for perioperative complications:   - No serious cardiac risks = 0 points     RCRI Interpretation: 0 points: Class I (very low risk - 0.4% complication rate)         Signed Electronically by: Isadora Cobb NP  Copy of this evaluation report is provided to requesting physician.

## 2024-06-24 NOTE — H&P (VIEW-ONLY)
Preoperative Evaluation  [facility] [place]  [address]  [address]  [place] [address]  Phone: [phone]  Fax: [phone]  Primary Provider: Isadora Cobb NP  Pre-op Performing Provider: Isadora Cobb NP  Jun 24, 2024 6/24/2024   Surgical Information   What procedure is being done? paraesophageal hernia repair with mesh and linx device placement    Facility or Hospital where procedure/surgery will be performed: [facility]   Who is doing the procedure / surgery? Jayce Mera   Date of surgery / procedure: 7/9/24   Time of surgery / procedure: Yet to be determined   Where do you plan to recover after surgery? at home alone        Fax number for surgical facility: Note does not need to be faxed, will be available electronically in Epic.    Assessment & Plan     The proposed surgical procedure is considered LOW risk.    Pre-operative examination  Cleared for procedure.  - CBC with platelets  - Basic metabolic panel  (Ca, Cl, CO2, Creat, Gluc, K, Na, BUN)    Hiatal hernia  Stable and followed by general surgery.  Cleared for procedure.    Generalized anxiety disorder  Mild episode of recurrent major depressive disorder (H24)  Stable.  Continue current medications    Class 2 severe obesity due to excess calories with serious comorbidity and body mass index (BMI) of 35.0 to 35.9 in adult (H)  Stable.  Discussed the importance of weight control to help with hiatal hernia.  Encouraged increasing daily physical activity and following a well balanced diet.  Patient is working on this.    Iron deficiency anemia, unspecified iron deficiency anemia type  Improving.  Continue with iron 325mg daily.  - Iron and iron binding capacity  - Ferritin        Risks and Recommendations  The patient has the following additional risks and recommendations for perioperative complications:  Pulmonary:    - well controlled asthma.  Anemia/Bleeding/Clotting:    - Anemia and does not require  treatment prior to surgery. Monitor hemoglobin postoperatively    Antiplatelet or Anticoagulation Medication Instructions   - Patient is on no antiplatelet or anticoagulation medications.    Additional Medication Instructions   - SSRIs, SNRIs, TCAs, Antipsychotics: Continue without modification.    - LABA, inhaled corticosteroid, long-acting anticholinergics: Continue without modification.   - rescue Inhaler: Continue PRN. Bring to hospital on the day of surgery.   - Hold multivitamin   - Continue taking omeprazole.   - Hold cetirizine day of procedure    Recommendation  Approval given to proceed with proposed procedure, without further diagnostic evaluation.    Scar Bray is a 44 year old, presenting for the following:    Pre-Op Exam          6/24/2024     4:05 PM   Additional Questions   Roomed by Laure DECKER   Accompanied by Self         6/24/2024     4:05 PM   Patient Reported Additional Medications   Patient reports taking the following new medications No     HPI related to upcoming procedure: GERD and hiatal hernia        6/24/2024   Pre-Op Questionnaire   Have you ever had a heart attack or stroke? No   Have you ever had surgery on your heart or blood vessels, such as a stent placement, a coronary artery bypass, or surgery on an artery in your head, neck, heart, or legs? No   Do you have chest pain with activity? No   Do you have a history of heart failure? No   Do you currently have a cold, bronchitis or symptoms of other infection? No   Do you have a cough, shortness of breath, or wheezing? (!) YES due to asthma.   Do you or anyone in your family have previous history of blood clots? No   Do you or does anyone in your family have a serious bleeding problem such as prolonged bleeding following surgeries or cuts? No   Have you ever had problems with anemia or been told to take iron pills? (!) YES--takes iron daily.   Have you had any abnormal blood loss such as black, tarry or bloody stools, or abnormal  vaginal bleeding? No   Have you ever had a blood transfusion? No   Are you willing to have a blood transfusion if it is medically needed before, during, or after your surgery? Yes   Have you or any of your relatives ever had problems with anesthesia? No   Do you have sleep apnea, excessive snoring or daytime drowsiness? No   Do you have any artifical heart valves or other implanted medical devices like a pacemaker, defibrillator, or continuous glucose monitor? No   Do you have artificial joints? No   Are you allergic to latex? No        Health Care Directive  Patient does not have a Health Care Directive or Living Will: Discussed advance care planning with patient; however, patient declined at this time.    Preoperative Review of    reviewed - no record of controlled substances prescribed.      Status of Chronic Conditions:  ANEMIA - Patient has a recent history of moderate-severe anemia, which has not been symptomatic. Work up to date has revealed 4/2024. Treatment has been well tolerated  takes iron 325mg daily.    ASTHMA - Patient has a longstanding history of moderate-severe Asthma . Patient has been doing well overall noting intermittent SOB and continues on medication regimen consisting of Symbicort, albuterol, and tiotropium without adverse reactions or side effects.     DEPRESSION - Patient has a long history of Depression of moderate severity requiring medication for control with recent symptoms being stable. Current symptoms of depression include none.     Patient Active Problem List    Diagnosis Date Noted    Class 2 severe obesity due to excess calories with serious comorbidity in adult (H) 04/16/2024     Priority: Medium    Gastroesophageal reflux disease without esophagitis 04/15/2024     Priority: Medium    Cervical high risk HPV (human papillomavirus) test positive 12/14/2022     Priority: Medium     08/23/17 NIL Pap, Neg HR HPV   12/7/22 NIL Pap, + HR HPV (not 16 or 18) Plan cotest in 1 year  due 12/7/23 02/14/24 NIL Pap, + HR HPV (neg 16/18). [place] due by 5/14/24  3/25/24 COLP and ECC and polyp- Negative for dysplasia. Plan cotest in 1 year due by 3/25/25          Hypertrophy of bone 10/09/2019     Priority: Medium     Added automatically from request for surgery [id number]        Mild major depression (H) 10/25/2018     Priority: Medium    Vitamin D Deficiency      Priority: Medium     Created by Conversion  Replacement Utility updated for latest IMO load        Allergic Rhinitis      Priority: Medium     Created by Conversion  Replacement Utility updated for latest IMO load        Scheuermann's kyphosis 05/17/2016     Priority: Medium    Pars defect of lumbar spine 05/17/2016     Priority: Medium    Vitamin B12 Deficiency      Priority: Medium     Created by Conversion        Anemia      Priority: Medium     Created by Conversion        Generalized Anxiety Disorder      Priority: Medium     Created by Conversion        Oral Allergy Syndrome      Priority: Medium     Created by Conversion          Past Medical History:   Diagnosis Date    Allergic rhinitis     Created by Conversion Replacement Utility updated for latest IMO load     Allergic rhinitis, cause unspecified     seasonal allergies    Anemia     Created by Conversion     Anxiety     Asthma     Carbuncle and furuncle of trunk 04/2001    R breast    Cervical high risk HPV (human papillomavirus) test positive 12/14/2022 08/23/17 NIL Pap, Neg HR HPV  12/7/22 NIL Pap, + HR HPV (not 16 or 18) Plan cotest in 1 year due 12/7/23 12/14/22 Pt was advised.     Depressive disorder     Dyspnea on exertion     Gastroesophageal reflux disease     Generalized anxiety disorder     Created by Conversion     Hiatal hernia     Hidradenitis 04/1995    Hypertrophy of bone 10/09/2019    Added automatically from request for surgery [id number]     Mild major depression (H) 10/25/2018    Obesity, unspecified     Oral Allergy Syndrome     Created by Conversion     Pars  defect of lumbar spine 05/17/2016    Personal history of tobacco use, presenting hazards to health     Scheuermann's kyphosis 05/17/2016    Vitamin B12 Deficiency     Created by Conversion     Vitamin D deficiency     Created by Conversion Replacement Utility updated for latest IMO load      Past Surgical History:   Procedure Laterality Date    CHOLECYSTECTOMY  2/27/2004    COLONOSCOPY      ESOPHAGOSCOPY, GASTROSCOPY, DUODENOSCOPY (EGD), COMBINED N/A 5/2/2024    Procedure: ESOPHAGOGASTRODUODENOSCOPY, WITH BIOPSY;  Surgeon: Jayce Chacko DO;  Location: [facility]    EYE SURGERY  1/15/2005    [facility]    ORTHOPEDIC SURGERY      Hammer Toe fix    REVISION AMADOU-EN-Y      ZZC GASTRIC BYPASS,OBESE<100CM AMADOU-EN-Y  02/27/2004    [facility]     Current Outpatient Medications   Medication Sig Dispense Refill    albuterol (PROAIR HFA/PROVENTIL HFA/VENTOLIN HFA) 108 (90 Base) MCG/ACT inhaler Inhale 2 puffs into the lungs every 6 hours as needed for shortness of breath, wheezing or cough 18 g 3    budesonide-formoterol (SYMBICORT) 160-4.5 MCG/ACT Inhaler Inhale 2 puffs into the lungs 2 times daily 10.2 g 3    cetirizine (ZYRTEC) 5 MG tablet Take 10 mg by mouth daily      citalopram (CELEXA) 20 MG tablet Take 1 tablet (20 mg) by mouth daily 90 tablet 3    multivitamin w/minerals (MULTI-VITAMIN) tablet Take 1 tablet by mouth daily      omeprazole (PRILOSEC) 20 MG DR capsule Take 1 capsule (20 mg) by mouth 2 times daily 180 capsule 1    tiotropium (SPIRIVA RESPIMAT) 2.5 MCG/ACT inhaler Inhale 2 puffs into the lungs daily 4 g 11       Allergies   Allergen Reactions    Other Environmental Allergy Other (See Comments)     Seasonal-ragweed, birch, dust mites    Oxycodone-Acetaminophen Nausea and Vomiting        Social History     Tobacco Use    Smoking status: Former     Current packs/day: 0.00     Average packs/day: 0.5 packs/day for 10.0 years (5.0 ttl pk-yrs)     Types: Cigarettes     Start date: 9/3/2014     " Quit date: 10/1/2021     Years since quittin.7     Passive exposure: Past    Smokeless tobacco: Never    Tobacco comments:     no passive exposure   Substance Use Topics    Alcohol use: Yes     Comment: weekends     History   Drug Use No             Review of Systems  Constitutional, HEENT, cardiovascular, pulmonary, gi and gu systems are negative, except as otherwise noted.    Objective    /70 (BP Location: Right arm, Patient Position: Sitting, Cuff Size: Adult Large)   Pulse 68   Temp 97.9  F (36.6  C) (Tympanic)   Resp 16   Ht 1.68 m (5' 6.14\")   Wt 100.4 kg (221 lb 6.4 oz)   SpO2 98%   BMI 35.58 kg/m     Estimated body mass index is 35.58 kg/m  as calculated from the following:    Height as of this encounter: 1.68 m (5' 6.14\").    Weight as of this encounter: 100.4 kg (221 lb 6.4 oz).    Wt Readings from Last 4 Encounters:   24 100.4 kg (221 lb 6.4 oz)   24 99.8 kg (220 lb)   24 101.2 kg (223 lb 3.2 oz)   24 103 kg (227 lb)        Physical Exam  GENERAL: alert and no distress  EYES: Eyes grossly normal to inspection, PERRL and conjunctivae and sclerae normal  HENT: ear canals and TM's normal, nose and mouth without ulcers or lesions  NECK: no adenopathy, no asymmetry, masses, or scars  RESP: lungs clear to auscultation - no rales, rhonchi or wheezes  CV: regular rate and rhythm, normal S1 S2, no S3 or S4, no murmur, click or rub, no peripheral edema  ABDOMEN: soft, nontender, no hepatosplenomegaly, no masses and bowel sounds normal  MS: no gross musculoskeletal defects noted, no edema  SKIN: no suspicious lesions or rashes  NEURO: Normal strength and tone, mentation intact and speech normal  PSYCH: mentation appears normal, affect normal/bright    Recent Labs   Lab Test 24  1505 24  0825 24  0852 24  2048 23  1306   HGB 9.3* 9.2*  --  10.0* 10.3*     --   --  316 344   INR  --   --   --  1.08  --    NA  --   --   --  140 138 "   POTASSIUM  --   --   --  3.4 3.8   CR  --   --   --  0.62 0.52   A1C  --   --  5.9*  --   --         Diagnostics  Recent Results (from the past 48 hour(s))   CBC with platelets    Collection Time: 06/24/24  4:48 PM   Result Value Ref Range    WBC Count 7.7 4.0 - 11.0 10e3/uL    RBC Count 4.64 3.80 - 5.20 10e6/uL    Hemoglobin 12.7 11.7 - 15.7 g/dL    Hematocrit 40.3 35.0 - 47.0 %    MCV 87 78 - 100 fL    MCH 27.4 26.5 - 33.0 pg    MCHC 31.5 31.5 - 36.5 g/dL    RDW 21.8 (H) 10.0 - 15.0 %    Platelet Count 235 150 - 450 10e3/uL   Basic metabolic panel  (Ca, Cl, CO2, Creat, Gluc, K, Na, BUN)    Collection Time: 06/24/24  4:48 PM   Result Value Ref Range    Sodium 137 135 - 145 mmol/L    Potassium 4.6 3.4 - 5.3 mmol/L    Chloride 104 98 - 107 mmol/L    Carbon Dioxide (CO2) 24 22 - 29 mmol/L    Anion Gap 9 7 - 15 mmol/L    Urea Nitrogen 16.3 6.0 - 20.0 mg/dL    Creatinine 0.60 0.51 - 0.95 mg/dL    GFR Estimate >90 >60 mL/min/1.73m2    Calcium 8.8 8.6 - 10.0 mg/dL    Glucose 89 70 - 99 mg/dL   Iron and iron binding capacity    Collection Time: 06/24/24  4:48 PM   Result Value Ref Range    Iron 98 37 - 145 ug/dL    Iron Binding Capacity 347 240 - 430 ug/dL    Iron Sat Index 28 15 - 46 %   Ferritin    Collection Time: 06/24/24  4:48 PM   Result Value Ref Range    Ferritin 30 6 - 175 ng/mL      No EKG required for low risk surgery (cataract, skin procedure, breast biopsy, etc).    Revised Cardiac Risk Index (RCRI)  The patient has the following serious cardiovascular risks for perioperative complications:   - No serious cardiac risks = 0 points     RCRI Interpretation: 0 points: Class I (very low risk - 0.4% complication rate)         Signed Electronically by: Isadora Cobb NP  Copy of this evaluation report is provided to requesting physician.

## 2024-06-25 LAB
ANION GAP SERPL CALCULATED.3IONS-SCNC: 9 MMOL/L (ref 7–15)
BUN SERPL-MCNC: 16.3 MG/DL (ref 6–20)
CALCIUM SERPL-MCNC: 8.8 MG/DL (ref 8.6–10)
CHLORIDE SERPL-SCNC: 104 MMOL/L (ref 98–107)
CREAT SERPL-MCNC: 0.6 MG/DL (ref 0.51–0.95)
DEPRECATED HCO3 PLAS-SCNC: 24 MMOL/L (ref 22–29)
EGFRCR SERPLBLD CKD-EPI 2021: >90 ML/MIN/1.73M2
FERRITIN SERPL-MCNC: 30 NG/ML (ref 6–175)
GLUCOSE SERPL-MCNC: 89 MG/DL (ref 70–99)
IRON BINDING CAPACITY (ROCHE): 347 UG/DL (ref 240–430)
IRON SATN MFR SERPL: 28 % (ref 15–46)
IRON SERPL-MCNC: 98 UG/DL (ref 37–145)
POTASSIUM SERPL-SCNC: 4.6 MMOL/L (ref 3.4–5.3)
SODIUM SERPL-SCNC: 137 MMOL/L (ref 135–145)

## 2024-06-28 ENCOUNTER — DOCUMENTATION ONLY (OUTPATIENT)
Dept: SURGERY | Facility: CLINIC | Age: 44
End: 2024-06-28
Payer: COMMERCIAL

## 2024-06-28 ENCOUNTER — TELEPHONE (OUTPATIENT)
Dept: SURGERY | Facility: CLINIC | Age: 44
End: 2024-06-28
Payer: COMMERCIAL

## 2024-06-28 DIAGNOSIS — J45.41 MODERATE PERSISTENT ASTHMA WITH ACUTE EXACERBATION: ICD-10-CM

## 2024-06-28 RX ORDER — BUDESONIDE AND FORMOTEROL FUMARATE DIHYDRATE 160; 4.5 UG/1; UG/1
2 AEROSOL RESPIRATORY (INHALATION) 2 TIMES DAILY
Qty: 10.2 G | Refills: 2 | Status: SHIPPED | OUTPATIENT
Start: 2024-06-28

## 2024-06-28 NOTE — PROGRESS NOTES
Federal Family and Medical Leave Act forms received, completed and signed on providers behalf.     Leave start date: 7/9/24     Leave end date: 7/23/24    RTW on with no/ the following restrictions.      Forms faxed to: [employer]: [phone]     Forms labeled and sent to HIM for scanning.       [facility]     Virginie Garcia  RN, BSN  [facility]  General Surgery  [address] [place] [id number]  [email or web address]   Office:[phone]  Employed by [facility] [employer],

## 2024-06-28 NOTE — TELEPHONE ENCOUNTER
[place] paperwork completed with a RTW date of 7/23/24. This was faxed to [employer].

## 2024-06-28 NOTE — TELEPHONE ENCOUNTER
Patient has surgery 7/9 with dr. Chacko and would like to know how long she will be out of work.    Please call and advise.    Thanks!

## 2024-07-08 ENCOUNTER — ANESTHESIA EVENT (OUTPATIENT)
Dept: SURGERY | Facility: CLINIC | Age: 44
End: 2024-07-08
Payer: COMMERCIAL

## 2024-07-09 ENCOUNTER — ANESTHESIA (OUTPATIENT)
Dept: SURGERY | Facility: CLINIC | Age: 44
End: 2024-07-09
Payer: COMMERCIAL

## 2024-07-09 ENCOUNTER — HOSPITAL ENCOUNTER (OUTPATIENT)
Facility: CLINIC | Age: 44
Discharge: HOME | End: 2024-07-09
Attending: SURGERY | Admitting: SURGERY
Payer: COMMERCIAL

## 2024-07-09 VITALS
TEMPERATURE: 98.2 F | OXYGEN SATURATION: 96 % | WEIGHT: 221 LBS | HEART RATE: 94 BPM | DIASTOLIC BLOOD PRESSURE: 67 MMHG | BODY MASS INDEX: 34.69 KG/M2 | SYSTOLIC BLOOD PRESSURE: 142 MMHG | RESPIRATION RATE: 16 BRPM | HEIGHT: 67 IN

## 2024-07-09 DIAGNOSIS — K21.9 GASTROESOPHAGEAL REFLUX DISEASE WITHOUT ESOPHAGITIS: Primary | ICD-10-CM

## 2024-07-09 PROCEDURE — L8699 PROSTHETIC IMPLANT NOS: HCPCS | Performed by: SURGERY

## 2024-07-09 PROCEDURE — S2900 ROBOTIC SURGICAL SYSTEM: HCPCS | Performed by: SURGERY

## 2024-07-09 PROCEDURE — 360N000080 HC SURGERY LEVEL 7, PER MIN: Performed by: SURGERY

## 2024-07-09 PROCEDURE — 250N000011 HC RX IP 250 OP 636: Performed by: ANESTHESIOLOGY

## 2024-07-09 PROCEDURE — 250N000009 HC RX 250: Performed by: NURSE ANESTHETIST, CERTIFIED REGISTERED

## 2024-07-09 PROCEDURE — 710N000012 HC RECOVERY PHASE 2, PER MINUTE: Performed by: SURGERY

## 2024-07-09 PROCEDURE — 272N000001 HC OR GENERAL SUPPLY STERILE: Performed by: SURGERY

## 2024-07-09 PROCEDURE — 710N000010 HC RECOVERY PHASE 1, LEVEL 2, PER MIN: Performed by: SURGERY

## 2024-07-09 PROCEDURE — 250N000025 HC SEVOFLURANE, PER MIN: Performed by: SURGERY

## 2024-07-09 PROCEDURE — 250N000011 HC RX IP 250 OP 636: Performed by: SURGERY

## 2024-07-09 PROCEDURE — 370N000017 HC ANESTHESIA TECHNICAL FEE, PER MIN: Performed by: SURGERY

## 2024-07-09 PROCEDURE — 250N000013 HC RX MED GY IP 250 OP 250 PS 637: Performed by: SURGERY

## 2024-07-09 PROCEDURE — 999N000141 HC STATISTIC PRE-PROCEDURE NURSING ASSESSMENT: Performed by: SURGERY

## 2024-07-09 PROCEDURE — C1781 MESH (IMPLANTABLE): HCPCS | Performed by: SURGERY

## 2024-07-09 PROCEDURE — 258N000003 HC RX IP 258 OP 636: Performed by: NURSE ANESTHETIST, CERTIFIED REGISTERED

## 2024-07-09 PROCEDURE — 43282 LAP PARAESOPH HER RPR W/MESH: CPT | Performed by: SURGERY

## 2024-07-09 PROCEDURE — 250N000011 HC RX IP 250 OP 636: Performed by: NURSE ANESTHETIST, CERTIFIED REGISTERED

## 2024-07-09 PROCEDURE — 258N000003 HC RX IP 258 OP 636: Performed by: ANESTHESIOLOGY

## 2024-07-09 DEVICE — LINX REFLUX MANAGEMENT SYSTEM
Type: IMPLANTABLE DEVICE | Site: ABDOMEN | Status: FUNCTIONAL
Brand: LINX

## 2024-07-09 DEVICE — ENFORM PREPERITONEAL 10CMX10CM BIOMATERIAL
Type: IMPLANTABLE DEVICE | Site: ABDOMEN | Status: FUNCTIONAL
Brand: GORE ENFORM PREPERITONEAL BIOMATERIAL

## 2024-07-09 DEVICE — ETHICON LINX ESOPHAGUS SIZING TOOL
Type: IMPLANTABLE DEVICE | Site: ABDOMEN | Status: FUNCTIONAL
Brand: ETHICON LINX ESOPHAGUS SIZING TOOL

## 2024-07-09 RX ORDER — CEFAZOLIN SODIUM/WATER 2 G/20 ML
2 SYRINGE (ML) INTRAVENOUS
Status: COMPLETED | OUTPATIENT
Start: 2024-07-09 | End: 2024-07-09

## 2024-07-09 RX ORDER — FENTANYL CITRATE 50 UG/ML
50 INJECTION, SOLUTION INTRAMUSCULAR; INTRAVENOUS EVERY 5 MIN PRN
Status: DISCONTINUED | OUTPATIENT
Start: 2024-07-09 | End: 2024-07-09 | Stop reason: HOSPADM

## 2024-07-09 RX ORDER — CEFAZOLIN SODIUM/WATER 2 G/20 ML
2 SYRINGE (ML) INTRAVENOUS SEE ADMIN INSTRUCTIONS
Status: DISCONTINUED | OUTPATIENT
Start: 2024-07-09 | End: 2024-07-09 | Stop reason: HOSPADM

## 2024-07-09 RX ORDER — HYDROCODONE BITARTRATE AND ACETAMINOPHEN 5; 325 MG/1; MG/1
1 TABLET ORAL
Status: COMPLETED | OUTPATIENT
Start: 2024-07-09 | End: 2024-07-09

## 2024-07-09 RX ORDER — FENTANYL CITRATE 50 UG/ML
25-100 INJECTION, SOLUTION INTRAMUSCULAR; INTRAVENOUS
Status: DISCONTINUED | OUTPATIENT
Start: 2024-07-09 | End: 2024-07-09 | Stop reason: HOSPADM

## 2024-07-09 RX ORDER — PROPOFOL 10 MG/ML
INJECTION, EMULSION INTRAVENOUS PRN
Status: DISCONTINUED | OUTPATIENT
Start: 2024-07-09 | End: 2024-07-09

## 2024-07-09 RX ORDER — FENTANYL CITRATE 50 UG/ML
25 INJECTION, SOLUTION INTRAMUSCULAR; INTRAVENOUS EVERY 5 MIN PRN
Status: DISCONTINUED | OUTPATIENT
Start: 2024-07-09 | End: 2024-07-09 | Stop reason: HOSPADM

## 2024-07-09 RX ORDER — KETAMINE HYDROCHLORIDE 10 MG/ML
INJECTION INTRAMUSCULAR; INTRAVENOUS PRN
Status: DISCONTINUED | OUTPATIENT
Start: 2024-07-09 | End: 2024-07-09

## 2024-07-09 RX ORDER — GABAPENTIN 100 MG/1
100 CAPSULE ORAL 3 TIMES DAILY
Qty: 30 CAPSULE | Refills: 0 | Status: SHIPPED | OUTPATIENT
Start: 2024-07-09 | End: 2024-08-08

## 2024-07-09 RX ORDER — NALOXONE HYDROCHLORIDE 0.4 MG/ML
0.1 INJECTION, SOLUTION INTRAMUSCULAR; INTRAVENOUS; SUBCUTANEOUS
Status: DISCONTINUED | OUTPATIENT
Start: 2024-07-09 | End: 2024-07-09 | Stop reason: HOSPADM

## 2024-07-09 RX ORDER — ONDANSETRON 4 MG/1
4 TABLET, ORALLY DISINTEGRATING ORAL EVERY 30 MIN PRN
Status: DISCONTINUED | OUTPATIENT
Start: 2024-07-09 | End: 2024-07-09 | Stop reason: HOSPADM

## 2024-07-09 RX ORDER — PROPOFOL 10 MG/ML
INJECTION, EMULSION INTRAVENOUS CONTINUOUS PRN
Status: DISCONTINUED | OUTPATIENT
Start: 2024-07-09 | End: 2024-07-09

## 2024-07-09 RX ORDER — BUPIVACAINE HYDROCHLORIDE 2.5 MG/ML
INJECTION, SOLUTION INFILTRATION; PERINEURAL
Status: DISCONTINUED
Start: 2024-07-09 | End: 2024-07-09 | Stop reason: HOSPADM

## 2024-07-09 RX ORDER — LIDOCAINE 40 MG/G
CREAM TOPICAL
Status: DISCONTINUED | OUTPATIENT
Start: 2024-07-09 | End: 2024-07-09 | Stop reason: HOSPADM

## 2024-07-09 RX ORDER — ONDANSETRON 2 MG/ML
INJECTION INTRAMUSCULAR; INTRAVENOUS PRN
Status: DISCONTINUED | OUTPATIENT
Start: 2024-07-09 | End: 2024-07-09

## 2024-07-09 RX ORDER — DOCUSATE SODIUM 100 MG/1
100 CAPSULE, LIQUID FILLED ORAL 2 TIMES DAILY
Qty: 30 CAPSULE | Refills: 0 | Status: SHIPPED | OUTPATIENT
Start: 2024-07-09 | End: 2024-08-08

## 2024-07-09 RX ORDER — HYDROMORPHONE HCL IN WATER/PF 6 MG/30 ML
0.2 PATIENT CONTROLLED ANALGESIA SYRINGE INTRAVENOUS EVERY 5 MIN PRN
Status: DISCONTINUED | OUTPATIENT
Start: 2024-07-09 | End: 2024-07-09 | Stop reason: HOSPADM

## 2024-07-09 RX ORDER — TRAMADOL HYDROCHLORIDE 50 MG/1
50 TABLET ORAL EVERY 6 HOURS PRN
Qty: 10 TABLET | Refills: 0 | Status: SHIPPED | OUTPATIENT
Start: 2024-07-09 | End: 2024-07-12

## 2024-07-09 RX ORDER — BUPIVACAINE HYDROCHLORIDE 2.5 MG/ML
INJECTION, SOLUTION EPIDURAL; INFILTRATION; INTRACAUDAL PRN
Status: DISCONTINUED | OUTPATIENT
Start: 2024-07-09 | End: 2024-07-09 | Stop reason: HOSPADM

## 2024-07-09 RX ORDER — SODIUM CHLORIDE, SODIUM LACTATE, POTASSIUM CHLORIDE, CALCIUM CHLORIDE 600; 310; 30; 20 MG/100ML; MG/100ML; MG/100ML; MG/100ML
INJECTION, SOLUTION INTRAVENOUS CONTINUOUS PRN
Status: DISCONTINUED | OUTPATIENT
Start: 2024-07-09 | End: 2024-07-09

## 2024-07-09 RX ORDER — LIDOCAINE HYDROCHLORIDE 10 MG/ML
INJECTION, SOLUTION INFILTRATION; PERINEURAL PRN
Status: DISCONTINUED | OUTPATIENT
Start: 2024-07-09 | End: 2024-07-09

## 2024-07-09 RX ORDER — KETOROLAC TROMETHAMINE 30 MG/ML
15 INJECTION, SOLUTION INTRAMUSCULAR; INTRAVENOUS ONCE
Status: COMPLETED | OUTPATIENT
Start: 2024-07-09 | End: 2024-07-09

## 2024-07-09 RX ORDER — ONDANSETRON 2 MG/ML
4 INJECTION INTRAMUSCULAR; INTRAVENOUS EVERY 30 MIN PRN
Status: DISCONTINUED | OUTPATIENT
Start: 2024-07-09 | End: 2024-07-09 | Stop reason: HOSPADM

## 2024-07-09 RX ORDER — SODIUM CHLORIDE, SODIUM LACTATE, POTASSIUM CHLORIDE, CALCIUM CHLORIDE 600; 310; 30; 20 MG/100ML; MG/100ML; MG/100ML; MG/100ML
INJECTION, SOLUTION INTRAVENOUS CONTINUOUS
Status: DISCONTINUED | OUTPATIENT
Start: 2024-07-09 | End: 2024-07-09 | Stop reason: HOSPADM

## 2024-07-09 RX ORDER — EPHEDRINE SULFATE 50 MG/ML
INJECTION, SOLUTION INTRAMUSCULAR; INTRAVENOUS; SUBCUTANEOUS PRN
Status: DISCONTINUED | OUTPATIENT
Start: 2024-07-09 | End: 2024-07-09

## 2024-07-09 RX ORDER — DEXAMETHASONE SODIUM PHOSPHATE 10 MG/ML
INJECTION, SOLUTION INTRAMUSCULAR; INTRAVENOUS PRN
Status: DISCONTINUED | OUTPATIENT
Start: 2024-07-09 | End: 2024-07-09

## 2024-07-09 RX ORDER — HYDROMORPHONE HCL IN WATER/PF 6 MG/30 ML
0.4 PATIENT CONTROLLED ANALGESIA SYRINGE INTRAVENOUS EVERY 5 MIN PRN
Status: DISCONTINUED | OUTPATIENT
Start: 2024-07-09 | End: 2024-07-09 | Stop reason: HOSPADM

## 2024-07-09 RX ORDER — DEXAMETHASONE SODIUM PHOSPHATE 4 MG/ML
4 INJECTION, SOLUTION INTRA-ARTICULAR; INTRALESIONAL; INTRAMUSCULAR; INTRAVENOUS; SOFT TISSUE
Status: DISCONTINUED | OUTPATIENT
Start: 2024-07-09 | End: 2024-07-09 | Stop reason: HOSPADM

## 2024-07-09 RX ORDER — MAGNESIUM SULFATE 4 G/50ML
4 INJECTION INTRAVENOUS ONCE
Status: COMPLETED | OUTPATIENT
Start: 2024-07-09 | End: 2024-07-09

## 2024-07-09 RX ADMIN — SODIUM CHLORIDE, POTASSIUM CHLORIDE, SODIUM LACTATE AND CALCIUM CHLORIDE: 600; 310; 30; 20 INJECTION, SOLUTION INTRAVENOUS at 09:00

## 2024-07-09 RX ADMIN — HYDROCODONE BITARTRATE AND ACETAMINOPHEN 1 TABLET: 5; 325 TABLET ORAL at 12:20

## 2024-07-09 RX ADMIN — SUGAMMADEX 200 MG: 100 INJECTION, SOLUTION INTRAVENOUS at 10:13

## 2024-07-09 RX ADMIN — SUGAMMADEX 100 MG: 100 INJECTION, SOLUTION INTRAVENOUS at 10:14

## 2024-07-09 RX ADMIN — HYDROMORPHONE HYDROCHLORIDE 0.2 MG: 1 INJECTION, SOLUTION INTRAMUSCULAR; INTRAVENOUS; SUBCUTANEOUS at 10:29

## 2024-07-09 RX ADMIN — Medication 2.5 MG: at 08:42

## 2024-07-09 RX ADMIN — KETAMINE HYDROCHLORIDE 10 MG: 10 INJECTION INTRAMUSCULAR; INTRAVENOUS at 09:00

## 2024-07-09 RX ADMIN — ROCURONIUM BROMIDE 50 MG: 10 INJECTION, SOLUTION INTRAVENOUS at 07:48

## 2024-07-09 RX ADMIN — KETOROLAC TROMETHAMINE 15 MG: 30 INJECTION, SOLUTION INTRAMUSCULAR at 10:46

## 2024-07-09 RX ADMIN — HYDROMORPHONE HYDROCHLORIDE 0.4 MG: 0.2 INJECTION, SOLUTION INTRAMUSCULAR; INTRAVENOUS; SUBCUTANEOUS at 10:58

## 2024-07-09 RX ADMIN — HYDROMORPHONE HYDROCHLORIDE 0.3 MG: 1 INJECTION, SOLUTION INTRAMUSCULAR; INTRAVENOUS; SUBCUTANEOUS at 09:57

## 2024-07-09 RX ADMIN — PROPOFOL 30 MCG/KG/MIN: 10 INJECTION, EMULSION INTRAVENOUS at 07:55

## 2024-07-09 RX ADMIN — SODIUM CHLORIDE, POTASSIUM CHLORIDE, SODIUM LACTATE AND CALCIUM CHLORIDE: 600; 310; 30; 20 INJECTION, SOLUTION INTRAVENOUS at 06:50

## 2024-07-09 RX ADMIN — MAGNESIUM SULFATE HEPTAHYDRATE 4 G: 4 INJECTION, SOLUTION INTRAVENOUS at 06:54

## 2024-07-09 RX ADMIN — ROCURONIUM BROMIDE 10 MG: 10 INJECTION, SOLUTION INTRAVENOUS at 10:00

## 2024-07-09 RX ADMIN — KETAMINE HYDROCHLORIDE 40 MG: 10 INJECTION INTRAMUSCULAR; INTRAVENOUS at 07:48

## 2024-07-09 RX ADMIN — LIDOCAINE HYDROCHLORIDE 50 MG: 10 INJECTION, SOLUTION INFILTRATION; PERINEURAL at 07:48

## 2024-07-09 RX ADMIN — HYDROMORPHONE HYDROCHLORIDE 0.5 MG: 1 INJECTION, SOLUTION INTRAMUSCULAR; INTRAVENOUS; SUBCUTANEOUS at 09:21

## 2024-07-09 RX ADMIN — ROCURONIUM BROMIDE 20 MG: 10 INJECTION, SOLUTION INTRAVENOUS at 09:15

## 2024-07-09 RX ADMIN — FENTANYL CITRATE 100 MCG: 50 INJECTION INTRAMUSCULAR; INTRAVENOUS at 07:48

## 2024-07-09 RX ADMIN — ONDANSETRON 4 MG: 2 INJECTION INTRAMUSCULAR; INTRAVENOUS at 07:48

## 2024-07-09 RX ADMIN — Medication 5 MG: at 09:09

## 2024-07-09 RX ADMIN — Medication 2.5 MG: at 09:25

## 2024-07-09 RX ADMIN — Medication 5 MG: at 08:20

## 2024-07-09 RX ADMIN — ROCURONIUM BROMIDE 20 MG: 10 INJECTION, SOLUTION INTRAVENOUS at 08:18

## 2024-07-09 RX ADMIN — DEXAMETHASONE SODIUM PHOSPHATE 10 MG: 10 INJECTION, SOLUTION INTRAMUSCULAR; INTRAVENOUS at 07:48

## 2024-07-09 RX ADMIN — SODIUM CHLORIDE, POTASSIUM CHLORIDE, SODIUM LACTATE AND CALCIUM CHLORIDE: 600; 310; 30; 20 INJECTION, SOLUTION INTRAVENOUS at 06:43

## 2024-07-09 RX ADMIN — ROCURONIUM BROMIDE 20 MG: 10 INJECTION, SOLUTION INTRAVENOUS at 08:47

## 2024-07-09 RX ADMIN — LIDOCAINE HYDROCHLORIDE 30 MG: 10 INJECTION, SOLUTION INFILTRATION; PERINEURAL at 10:15

## 2024-07-09 RX ADMIN — PROPOFOL 200 MG: 10 INJECTION, EMULSION INTRAVENOUS at 07:48

## 2024-07-09 RX ADMIN — ROCURONIUM BROMIDE 10 MG: 10 INJECTION, SOLUTION INTRAVENOUS at 09:45

## 2024-07-09 RX ADMIN — Medication 2 G: at 07:38

## 2024-07-09 RX ADMIN — Medication 5 MG: at 08:55

## 2024-07-09 RX ADMIN — MIDAZOLAM 2 MG: 1 INJECTION INTRAMUSCULAR; INTRAVENOUS at 07:38

## 2024-07-09 ASSESSMENT — ACTIVITIES OF DAILY LIVING (ADL)
ADLS_ACUITY_SCORE: 18
ADLS_ACUITY_SCORE: 16
ADLS_ACUITY_SCORE: 18

## 2024-07-09 ASSESSMENT — LIFESTYLE VARIABLES: TOBACCO_USE: 1

## 2024-07-09 NOTE — PHARMACY-ADMISSION MEDICATION HISTORY
Pharmacist Admission Medication History    Admission medication history is complete. The information provided in this note is only as accurate as the sources available at the time of the update.    Information Source(s): Patient and CareEverywhere/SureScripts via in-person    Pertinent Information:     Allergies reviewed with patient and updates made in EHR: no    Medication History Completed By: Malina Son RPH 7/9/2024 7:00 AM    PTA Med List   Medication Sig Last Dose    albuterol (PROAIR HFA/PROVENTIL HFA/VENTOLIN HFA) 108 (90 Base) MCG/ACT inhaler Inhale 2 puffs into the lungs every 6 hours as needed for shortness of breath, wheezing or cough pt brought    budesonide-formoterol (SYMBICORT) 160-4.5 MCG/ACT Inhaler Inhale 2 puffs into the lungs 2 times daily 7/9/2024 at AM not with    cetirizine (ZYRTEC) 10 MG tablet Take 10 mg by mouth daily 7/8/2024 at AM    citalopram (CELEXA) 20 MG tablet Take 1 tablet (20 mg) by mouth daily 7/8/2024 at AM    multivitamin w/minerals (MULTI-VITAMIN) tablet Take 1 tablet by mouth daily 7/2/2024    omeprazole (PRILOSEC) 20 MG DR capsule Take 20 mg by mouth daily 7/8/2024 at AM    tiotropium (SPIRIVA RESPIMAT) 2.5 MCG/ACT inhaler Inhale 2 puffs into the lungs daily 7/9/2024 at not with

## 2024-07-09 NOTE — ANESTHESIA CARE TRANSFER NOTE
Patient: Katarina Mcclain    Procedure: Procedure(s):  paraesophageal hernia repair with mesh and linx device placement, ROBOT-ASSISTED, LAPAROSCOPIC, USING DA GLORIA XI; LYSIS OF ADHESIONS       Diagnosis: Hiatal hernia [K44.9]  Diagnosis Additional Information: No value filed.    Anesthesia Type:   General     Note:    Oropharynx: oropharynx clear of all foreign objects and spontaneously breathing  Level of Consciousness: awake  Oxygen Supplementation: face mask  Level of Supplemental Oxygen (L/min / FiO2): 6  Independent Airway: airway patency satisfactory and stable  Dentition: dentition unchanged  Vital Signs Stable: post-procedure vital signs reviewed and stable  Report to RN Given: handoff report given  Patient transferred to: PACU  Comments: Oropharynx suctioned. Spont resp.  Extubated when following commands.  Coughing up a lot of mucous.   To PACU. Report to RN at bedside.  Pt complains 8/10 shoulder pain.  Dilaudid given.   Handoff Report: Identifed the Patient, Identified the Reponsible Provider, Reviewed the pertinent medical history, Discussed the surgical course, Reviewed Intra-OP anesthesia mangement and issues during anesthesia, Set expectations for post-procedure period and Allowed opportunity for questions and acknowledgement of understanding      Vitals:  Vitals Value Taken Time   /85 07/09/24 1031   Temp 36  C (96.8  F) 07/09/24 1031   Pulse 89 07/09/24 1031   Resp 15 07/09/24 1031   SpO2 98 % 07/09/24 1031       Electronically Signed By: HARSH Sanford CRNA  July 9, 2024  10:33 AM

## 2024-07-09 NOTE — ANESTHESIA PROCEDURE NOTES
Airway       Patient location during procedure: OR       Procedure Start/Stop Times: 7/9/2024 7:52 AM  Staff -        Anesthesiologist:  Gerry Mann MD       CRNA: Selvin Gunter APRN CRNA       Performed By: CRNA  Consent for Airway        Urgency: elective  Indications and Patient Condition       Indications for airway management: pop-procedural       Induction type:intravenous       Mask difficulty assessment: 1 - vent by mask    Final Airway Details       Final airway type: endotracheal airway       Successful airway: ETT - single  Endotracheal Airway Details        ETT size (mm): 7.0       Cuffed: yes       Successful intubation technique: direct laryngoscopy       DL Blade Type: Heart 2       Grade View of Cords: 1       Adjucts: stylet       Position: Right       Measured from: lips       Secured at (cm): 23       Bite block used: None    Post intubation assessment        Placement verified by: capnometry, equal breath sounds and chest rise        Number of attempts at approach: 1       Secured with: tape       Ease of procedure: easy       Dentition: Intact (chip noted on front right tooth prior to intubation)       Dental guard used and removed. Dental Guard Type: Standard White.    Medication(s) Administered   Medication Administration Time: 7/9/2024 7:52 AM    Additional Comments       Vocal cords open and clear. Atraumatic.

## 2024-07-09 NOTE — OP NOTE
Name:  Katarina JACKSON Sarahi  PCP:  Isadora Cobb  Procedure Date:  7/9/2024      Procedure(s):  #1 lysis of adhesions-laparoscopic  #2 -paraesophageal hernia repair with mesh and linx device placement, ROBOT-ASSISTED, LAPAROSCOPIC, USING DA GLORIA XI; LYSIS OF ADHESIONS    Pre-Procedure Diagnosis:  Hiatal hernia [K44.9]     Post-Procedure Diagnosis:    Paraesophageal hernia    Surgeon(s):  Jayce Chacko DO    Circulator: Arturo Ramos RN; Rishabh Hernandez RN  Scrub Person: Maureen Kaur; Roseline Crystal    Anesthesia Type:  GET      Findings:  Intra-abdominal adhesions, paraesophageal hernia with gastric pouch in the chest    Operative Report:    The Patient was taken back to the operating room and placed in a supine position.  Endotracheal intubation was performed.  The abdomen was prepped and draped in a sterile fashion.  I  made an 8 mm incision.  I then establish pneumoperitoneum using a Veress needle technique.  Once this was complete I placed an 8 mm trocar into the abdomen.  I scrutinized the surrounding structures with a robotic camera for any injuries upon entry none were found.  Next, I placed a Nya liver retractor via a 5 mm subxiphoid incision.  This was secured to the bed in the standard fashion.  I then placed 3 additional 8 mm trochars in the abdomen.  One in the right upper quadrant and 2 in the left upper quadrant. Enform absorbable mesh was placed into the abdomen.   Because of the extensive lesions within the abdomen surrounding liver I first performed a laparoscopic lysis of adhesions using [place] LigaSure sealing device.  Once this was done the robot was docked in the standard fashion once the patient was placed in reverse Trendelenburg.    I then proceeded to address the hiatus.  Several adhesions surrounding the liver and the gastric remnant as well as the small intestines were taken down with sharp dissection.  Care was taken so as to preserve the integrity of the  intestines in the stomach.  I took down the pars lucidum and spared the vessels and gastrohepatic ligament.  I then entered the avascular plane between the hernia sac and the crura and circumferentially dissected out the hernia sac using vessel sealing device.    I then created a retroesophageal window and placed 1/4 inch Penrose through this for esophageal retraction.  The posterior sac was also dissected free as was the small lipoma in the retroesophageal space.  I ensured a wide and deep mediastinal dissection of the periesophageal tissue.  The gastric pouch from her previous gastric bypass was entirely in the chest.  This was removed without difficulty by taking down the hernia sac.  Once this was complete I then reapproximated the left and right crura with a 0 nonabsorbable V lock suture in a running fashion.  I then placed the previously fashioned  Enform mesh into the retroesophageal window and sutured this in place for extra added support with additional 2-0 Vicryl sutures.  The mesh was wrapped around the esophagus without stricturing or narrowing in a key hole fashion.  Once this was in place I then noted that from her previous surgery the vagus nerve was not present.  I then scrubbed back into the case and undocked the right upper quadrant robotic trocar.  A magnetic sphincter sizing device was utilized to measure the anticipated LINX device placement.  I sized the device several times and found that the 16 size device was the most appropriate.  I then placed this into the abdomen through the 8 mm right upper quadrant trocar.  The trocar was then redocked and I turned my attention back to the console.  I then brought the device through the window in the retroesophageal space.  A 0 Ethibond suture was used to secure the device to the anterior portion of the esophagus.  The Penrose drain was removed.  I then secured the device using the prefashioned clasping mechanism.  Once this was noted to be secured I  then scrubbed back into the case and proceeded to remove all nonabsorbable material.  The robot was undocked and the liver retractor was removed as well.   I then removed all trochars and reapproximated skin edges with 4-0 Monocryl suture.  The abdomen was cleaned and all wounds were cleaned and covered with Steri-Strips and Band-Aids.  The patient was extubated sent to the PACU to undergo recovery.  All lap counts and needle counts were correct at the end of the procedure.    Estimated Blood Loss:   10 cc    Specimens:    * No specimens in log *       Drains:        Complications:    None    Jayce Chacko DO

## 2024-07-09 NOTE — ANESTHESIA PREPROCEDURE EVALUATION
Anesthesia Pre-Procedure Evaluation    Patient: Katarina Mcclain   MRN: [id number] : 1980        Procedure : Procedure(s):  paraesophageal hernia repair with mesh and linx device placement, ROBOT-ASSISTED, LAPAROSCOPIC, USING DA GLORIA XI          Past Medical History:   Diagnosis Date     Allergic rhinitis     Created by Conversion Replacement Utility updated for latest IMO load      Allergic rhinitis, cause unspecified     seasonal allergies     Anemia     Created by Conversion      Anxiety      Asthma      Carbuncle and furuncle of trunk 2001    R breast     Cervical high risk HPV (human papillomavirus) test positive 2022 NIL Pap, Neg HR HPV  22 NIL Pap, + HR HPV (not 16 or 18) Plan cotest in 1 year due 23 Pt was advised.      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     Obesity, unspecified      Oral Allergy Syndrome     Created by Conversion      Pars defect of lumbar spine 2016     Personal history of tobacco use, presenting hazards to health      Scheuermann's kyphosis 2016     Vitamin B12 Deficiency     Created by Conversion      Vitamin D deficiency     Created by Conversion Replacement Utility updated for latest IMO load       Past Surgical History:   Procedure Laterality Date     CHOLECYSTECTOMY  2004     COLONOSCOPY       ESOPHAGOSCOPY, GASTROSCOPY, DUODENOSCOPY (EGD), COMBINED N/A 2024    Procedure: ESOPHAGOGASTRODUODENOSCOPY, WITH BIOPSY;  Surgeon: Jayce Chacko DO;  Location: [facility]     EYE SURGERY  1/15/2005    [facility]     ORTHOPEDIC SURGERY      Hammer Toe fix     REVISION AMADOU-EN-Y       ZZC GASTRIC BYPASS,OBESE<100CM AMADOU-EN-Y  2004     [facility]      Allergies   Allergen Reactions     Other Environmental Allergy Other (See Comments)     Seasonal-ragweed, birch, dust mites     Oxycodone-Acetaminophen Nausea and Vomiting      Social History     Tobacco Use     Smoking status: Former     Current packs/day: 0.00     Average packs/day: 0.5 packs/day for 10.0 years (5.0 ttl pk-yrs)     Types: Cigarettes     Start date: 9/3/2014     Quit date: 10/1/2021     Years since quittin.7     Passive exposure: Past     Smokeless tobacco: Never     Tobacco comments:     no passive exposure   Substance Use Topics     Alcohol use: Yes     Comment: weekends      Wt Readings from Last 1 Encounters:   24 100.2 kg (221 lb)        Anesthesia Evaluation   Pt has had prior anesthetic.     No history of anesthetic complications       ROS/MED HX  ENT/Pulmonary:     (+)                tobacco use (quit ),   10  Pack-Year Hx,    asthma                  Neurologic:  - neg neurologic ROS     Cardiovascular:  - neg cardiovascular ROS     METS/Exercise Tolerance:     Hematologic:     (+)      anemia,          Musculoskeletal:  - neg musculoskeletal ROS     GI/Hepatic:     (+) GERD,     hiatal hernia,              Renal/Genitourinary:  - neg Renal ROS     Endo:  - neg endo ROS   (+)               Obesity,       Psychiatric/Substance Use:     (+) psychiatric history anxiety and depression       Infectious Disease:       Malignancy:  - neg malignancy ROS     Other:          Physical Exam    Airway        Mallampati: II   TM distance: > 3 FB   Neck ROM: full   Mouth opening: > 3 cm    Respiratory Devices and Support         Dental       (+) Minor Abnormalities - some fillings, tiny chips      Cardiovascular   cardiovascular exam normal       Rhythm and rate: normal     Pulmonary   pulmonary exam normal        breath sounds clear to auscultation       OUTSIDE LABS:  CBC:   Lab Results   Component Value Date    WBC 7.7 2024    WBC 8.2 2024    HGB  12.7 06/24/2024    HGB 9.3 (L) 04/02/2024    HCT 40.3 06/24/2024    HCT 31.1 (L) 04/02/2024     06/24/2024     04/02/2024     BMP:   Lab Results   Component Value Date     06/24/2024     01/29/2024    POTASSIUM 4.6 06/24/2024    POTASSIUM 3.4 01/29/2024    CHLORIDE 104 06/24/2024    CHLORIDE 103 01/29/2024    CO2 24 06/24/2024    CO2 25 01/29/2024    BUN 16.3 06/24/2024    BUN 9.2 01/29/2024    CR 0.60 06/24/2024    CR 0.62 01/29/2024    GLC 89 06/24/2024     (H) 01/29/2024     COAGS:   Lab Results   Component Value Date    INR 1.08 01/29/2024     POC:   Lab Results   Component Value Date    HCG Negative 03/25/2024     HEPATIC:   Lab Results   Component Value Date    ALBUMIN 4.3 01/29/2024    PROTTOTAL 7.5 01/29/2024    ALT 23 01/29/2024    AST 21 01/29/2024    ALKPHOS 95 01/29/2024    BILITOTAL 0.2 01/29/2024     OTHER:   Lab Results   Component Value Date    A1C 5.9 (H) 02/14/2024    CHARLENE 8.8 06/24/2024    MAG 2.1 01/29/2024    TSH 1.91 01/29/2024    T4 1.00 12/15/2021    CRP 0.5 10/13/2021    SED 12 10/13/2021       Anesthesia Plan    ASA Status:  2    NPO Status:  NPO Appropriate    Anesthesia Type: General.     - Airway: ETT   Induction: Intravenous, Propofol.   Maintenance: Balanced.   Techniques and Equipment:       - Drips/Meds: Ketamine     Consents    Anesthesia Plan(s) and associated risks, benefits, and realistic alternatives discussed. Questions answered and patient/representative(s) expressed understanding.     - Discussed: Risks, Benefits and Alternatives for the PROCEDURE were discussed     - Discussed with:  Patient            Postoperative Care    Pain management: IV analgesics, Multi-modal analgesia.   PONV prophylaxis: Ondansetron (or other 5HT-3), Dexamethasone or Solumedrol     Comments:    Other Comments: arnold Mann MD    I have reviewed the pertinent notes and labs in the chart from the past 30 days and (re)examined the patient.  Any  "updates or changes from those notes are reflected in this note.              # Obesity: Estimated body mass index is 34.87 kg/m  as calculated from the following:    Height as of this encounter: 1.695 m (5' 6.75\").    Weight as of this encounter: 100.2 kg (221 lb).      "

## 2024-07-09 NOTE — INTERVAL H&P NOTE
I have reviewed the surgical (or preoperative) H&P that is linked to this encounter, and examined the patient. There are no significant changes    Plan for Procedure(s):  paraesophageal hernia repair with mesh and linx device placement, ROBOT-ASSISTED, LAPAROSCOPIC, USING DA GLORIA XI     Derrick Chacko [facility]  [phone]

## 2024-07-09 NOTE — ANESTHESIA POSTPROCEDURE EVALUATION
Patient: Katarina Mcclain    Procedure: Procedure(s):  paraesophageal hernia repair with mesh and linx device placement, ROBOT-ASSISTED, LAPAROSCOPIC, USING DA GLORIA XI; LYSIS OF ADHESIONS       Anesthesia Type:  General    Note:  Disposition: Outpatient   Postop Pain Control: Uneventful            Sign Out: Well controlled pain   PONV: No   Neuro/Psych: Uneventful            Sign Out: Acceptable/Baseline neuro status   Airway/Respiratory: Uneventful            Sign Out: Acceptable/Baseline resp. status   CV/Hemodynamics: Uneventful            Sign Out: Acceptable CV status; No obvious hypovolemia; No obvious fluid overload   Other NRE: NONE   DID A NON-ROUTINE EVENT OCCUR? No       Last vitals:  Vitals Value Taken Time   /69 07/09/24 1110   Temp 36.7  C (98  F) 07/09/24 1110   Pulse 90 07/09/24 1115   Resp 12 07/09/24 1115   SpO2 94 % 07/09/24 1115   Vitals shown include unfiled device data.    Electronically Signed By: Gerry Mann MD  July 9, 2024  1:30 PM

## 2024-08-08 ENCOUNTER — OFFICE VISIT (OUTPATIENT)
Dept: SURGERY | Facility: CLINIC | Age: 44
End: 2024-08-08
Payer: COMMERCIAL

## 2024-08-08 DIAGNOSIS — Z98.890 POST-OPERATIVE STATE: Primary | ICD-10-CM

## 2024-08-08 PROCEDURE — 99024 POSTOP FOLLOW-UP VISIT: CPT | Performed by: SURGERY

## 2024-08-08 NOTE — LETTER
8/8/2024      Katarina Mcclain  [address] [address]      Dear Colleague,    Thank you for referring your patient, Katarina Mcclain, to the [facility] [place]. Please see a copy of my visit note below.     HPI: Katarina Mcclain is here for follow up to her Linx placement.  She is tolerating soft foods at present and is doing well with no complaints.  She has no reflux.    Allergies, Medications, Social History, Past Medical History and Past Surgical History were reviewed and are noted in the chart.    There were no vitals taken for this visit.  There is no height or weight on file to calculate BMI.      EXAM:   GENERAL: Appears well  Abdomen-well-healed incisions    Incision/Surgical Site 07/09/24 Midline Abdomen (Active)       Incision/Surgical Site 07/09/24 Right;Upper Abdomen (Active)       Incision/Surgical Site 07/09/24 Left;Upper Abdomen (Active)       Incision/Surgical Site 07/09/24 Left;Distal Abdomen (Active)       Incision/Surgical Site 07/09/24 Upper;Midline Abdomen (Active)       Assessment/Plan: Katarina Mcclain continues to do well. Her wound is healing and overall progressing well.  At this point she will continue with her diet as tolerated.  Her Lynx is in a nice position and I expect a full recovery.  She will follow-up with me as needed if she has any recurrent symptoms or any other questions or concerns.        Derrick Chacko DO, FACS  [facility]  [phone]       Again, thank you for allowing me to participate in the care of your patient.        Sincerely,        Jayce Chacko, DO

## 2024-08-14 NOTE — PROGRESS NOTES
HPI: Katarina Mcclain is here for follow up to her Linx placement.  She is tolerating soft foods at present and is doing well with no complaints.  She has no reflux.    Allergies, Medications, Social History, Past Medical History and Past Surgical History were reviewed and are noted in the chart.    There were no vitals taken for this visit.  There is no height or weight on file to calculate BMI.      EXAM:   GENERAL: Appears well  Abdomen-well-healed incisions    Incision/Surgical Site 07/09/24 Midline Abdomen (Active)       Incision/Surgical Site 07/09/24 Right;Upper Abdomen (Active)       Incision/Surgical Site 07/09/24 Left;Upper Abdomen (Active)       Incision/Surgical Site 07/09/24 Left;Distal Abdomen (Active)       Incision/Surgical Site 07/09/24 Upper;Midline Abdomen (Active)       Assessment/Plan: Katarina Mcclain continues to do well. Her wound is healing and overall progressing well.  At this point she will continue with her diet as tolerated.  Her Lynx is in a nice position and I expect a full recovery.  She will follow-up with me as needed if she has any recurrent symptoms or any other questions or concerns.        Derrick Chacko DO [facility]  [phone]

## 2024-11-14 DIAGNOSIS — J45.41 MODERATE PERSISTENT ASTHMA WITH ACUTE EXACERBATION: ICD-10-CM

## 2024-11-15 RX ORDER — BUDESONIDE AND FORMOTEROL FUMARATE DIHYDRATE 160; 4.5 UG/1; UG/1
2 AEROSOL RESPIRATORY (INHALATION) 2 TIMES DAILY
Qty: 10.2 G | Refills: 2 | Status: SHIPPED | OUTPATIENT
Start: 2024-11-15

## 2024-11-15 NOTE — TELEPHONE ENCOUNTER
1st attempt: sent pt a Qoopl message with [employer] for patient to complete.    Salena Lepe MA 8:29 AM 11/15/2024

## 2025-01-15 ENCOUNTER — PATIENT OUTREACH (OUTPATIENT)
Dept: CARE COORDINATION | Facility: CLINIC | Age: 45
End: 2025-01-15
Payer: COMMERCIAL

## 2025-01-22 ENCOUNTER — ANCILLARY PROCEDURE (OUTPATIENT)
Dept: MAMMOGRAPHY | Facility: CLINIC | Age: 45
End: 2025-01-22
Attending: NURSE PRACTITIONER
Payer: COMMERCIAL

## 2025-01-22 DIAGNOSIS — Z12.31 VISIT FOR SCREENING MAMMOGRAM: ICD-10-CM

## 2025-01-22 PROCEDURE — 77063 BREAST TOMOSYNTHESIS BI: CPT | Mod: TC | Performed by: RADIOLOGY

## 2025-01-22 PROCEDURE — 77067 SCR MAMMO BI INCL CAD: CPT | Mod: TC | Performed by: RADIOLOGY

## 2025-01-28 ENCOUNTER — HOSPITAL ENCOUNTER (OUTPATIENT)
Dept: MAMMOGRAPHY | Facility: CLINIC | Age: 45
Discharge: HOME | End: 2025-01-28
Attending: NURSE PRACTITIONER
Payer: COMMERCIAL

## 2025-01-28 DIAGNOSIS — R92.8 ABNORMAL MAMMOGRAM: ICD-10-CM

## 2025-01-28 PROCEDURE — 77065 DX MAMMO INCL CAD UNI: CPT | Mod: LT

## 2025-01-28 PROCEDURE — 77061 BREAST TOMOSYNTHESIS UNI: CPT | Mod: LT

## 2025-01-28 PROCEDURE — 76642 ULTRASOUND BREAST LIMITED: CPT | Mod: LT

## 2025-02-18 SDOH — HEALTH STABILITY: PHYSICAL HEALTH: ON AVERAGE, HOW MANY DAYS PER WEEK DO YOU ENGAGE IN MODERATE TO STRENUOUS EXERCISE (LIKE A BRISK WALK)?: 3 DAYS

## 2025-02-18 SDOH — HEALTH STABILITY: PHYSICAL HEALTH: ON AVERAGE, HOW MANY MINUTES DO YOU ENGAGE IN EXERCISE AT THIS LEVEL?: 60 MIN

## 2025-02-18 ASSESSMENT — ASTHMA QUESTIONNAIRES
QUESTION_5 LAST FOUR WEEKS HOW WOULD YOU RATE YOUR ASTHMA CONTROL: COMPLETELY CONTROLLED
ACT_TOTALSCORE: 25
QUESTION_2 LAST FOUR WEEKS HOW OFTEN HAVE YOU HAD SHORTNESS OF BREATH: NOT AT ALL
ACT_TOTALSCORE: 25
QUESTION_4 LAST FOUR WEEKS HOW OFTEN HAVE YOU USED YOUR RESCUE INHALER OR NEBULIZER MEDICATION (SUCH AS ALBUTEROL): NOT AT ALL
QUESTION_1 LAST FOUR WEEKS HOW MUCH OF THE TIME DID YOUR ASTHMA KEEP YOU FROM GETTING AS MUCH DONE AT WORK, SCHOOL OR AT HOME: NONE OF THE TIME
QUESTION_3 LAST FOUR WEEKS HOW OFTEN DID YOUR ASTHMA SYMPTOMS (WHEEZING, COUGHING, SHORTNESS OF BREATH, CHEST TIGHTNESS OR PAIN) WAKE YOU UP AT NIGHT OR EARLIER THAN USUAL IN THE MORNING: NOT AT ALL

## 2025-02-18 ASSESSMENT — PATIENT HEALTH QUESTIONNAIRE - PHQ9
10. IF YOU CHECKED OFF ANY PROBLEMS, HOW DIFFICULT HAVE THESE PROBLEMS MADE IT FOR YOU TO DO YOUR WORK, TAKE CARE OF THINGS AT HOME, OR GET ALONG WITH OTHER PEOPLE: NOT DIFFICULT AT ALL
SUM OF ALL RESPONSES TO PHQ QUESTIONS 1-9: 1
SUM OF ALL RESPONSES TO PHQ QUESTIONS 1-9: 1

## 2025-02-18 ASSESSMENT — SOCIAL DETERMINANTS OF HEALTH (SDOH): HOW OFTEN DO YOU GET TOGETHER WITH FRIENDS OR RELATIVES?: THREE TIMES A WEEK

## 2025-02-19 ENCOUNTER — OFFICE VISIT (OUTPATIENT)
Dept: PEDIATRICS | Facility: CLINIC | Age: 45
End: 2025-02-19
Attending: NURSE PRACTITIONER
Payer: COMMERCIAL

## 2025-02-19 VITALS
RESPIRATION RATE: 16 BRPM | WEIGHT: 239.6 LBS | TEMPERATURE: 98.4 F | SYSTOLIC BLOOD PRESSURE: 128 MMHG | BODY MASS INDEX: 38.51 KG/M2 | OXYGEN SATURATION: 98 % | DIASTOLIC BLOOD PRESSURE: 86 MMHG | HEIGHT: 66 IN | HEART RATE: 77 BPM

## 2025-02-19 DIAGNOSIS — E66.812 CLASS 2 OBESITY DUE TO EXCESS CALORIES WITHOUT SERIOUS COMORBIDITY WITH BODY MASS INDEX (BMI) OF 38.0 TO 38.9 IN ADULT: ICD-10-CM

## 2025-02-19 DIAGNOSIS — J45.50 SEVERE PERSISTENT ASTHMA WITHOUT COMPLICATION (H): ICD-10-CM

## 2025-02-19 DIAGNOSIS — R87.810 CERVICAL HIGH RISK HPV (HUMAN PAPILLOMAVIRUS) TEST POSITIVE: ICD-10-CM

## 2025-02-19 DIAGNOSIS — R73.03 PREDIABETES: ICD-10-CM

## 2025-02-19 DIAGNOSIS — Z11.3 SCREENING EXAMINATION FOR STI: ICD-10-CM

## 2025-02-19 DIAGNOSIS — F33.0 MILD EPISODE OF RECURRENT MAJOR DEPRESSIVE DISORDER: ICD-10-CM

## 2025-02-19 DIAGNOSIS — Z12.11 SCREEN FOR COLON CANCER: ICD-10-CM

## 2025-02-19 DIAGNOSIS — K21.9 GASTROESOPHAGEAL REFLUX DISEASE WITHOUT ESOPHAGITIS: ICD-10-CM

## 2025-02-19 DIAGNOSIS — Z00.00 ROUTINE GENERAL MEDICAL EXAMINATION AT A HEALTH CARE FACILITY: Primary | ICD-10-CM

## 2025-02-19 DIAGNOSIS — Z13.220 SCREENING FOR CHOLESTEROL LEVEL: ICD-10-CM

## 2025-02-19 DIAGNOSIS — Z23 NEED FOR VACCINATION: ICD-10-CM

## 2025-02-19 DIAGNOSIS — Z78.9 ALCOHOL USE: ICD-10-CM

## 2025-02-19 DIAGNOSIS — Z12.4 CERVICAL CANCER SCREENING: ICD-10-CM

## 2025-02-19 DIAGNOSIS — D50.9 IRON DEFICIENCY ANEMIA, UNSPECIFIED IRON DEFICIENCY ANEMIA TYPE: ICD-10-CM

## 2025-02-19 DIAGNOSIS — E66.09 CLASS 2 OBESITY DUE TO EXCESS CALORIES WITHOUT SERIOUS COMORBIDITY WITH BODY MASS INDEX (BMI) OF 38.0 TO 38.9 IN ADULT: ICD-10-CM

## 2025-02-19 DIAGNOSIS — F41.1 GENERALIZED ANXIETY DISORDER: ICD-10-CM

## 2025-02-19 PROBLEM — E66.01 CLASS 2 SEVERE OBESITY DUE TO EXCESS CALORIES WITH SERIOUS COMORBIDITY IN ADULT (H): Status: RESOLVED | Noted: 2024-04-16 | Resolved: 2025-02-19

## 2025-02-19 LAB
ALBUMIN SERPL BCG-MCNC: 4.2 G/DL (ref 3.5–5.2)
ALP SERPL-CCNC: 103 U/L (ref 40–150)
ALT SERPL W P-5'-P-CCNC: 31 U/L (ref 0–50)
ANION GAP SERPL CALCULATED.3IONS-SCNC: 13 MMOL/L (ref 7–15)
AST SERPL W P-5'-P-CCNC: 34 U/L (ref 0–45)
BILIRUB SERPL-MCNC: 0.3 MG/DL
BUN SERPL-MCNC: 14.7 MG/DL (ref 6–20)
C TRACH DNA SPEC QL NAA+PROBE: NEGATIVE
CALCIUM SERPL-MCNC: 9.1 MG/DL (ref 8.8–10.4)
CHLORIDE SERPL-SCNC: 104 MMOL/L (ref 98–107)
CHOLEST SERPL-MCNC: 194 MG/DL
CREAT SERPL-MCNC: 0.61 MG/DL (ref 0.51–0.95)
EGFRCR SERPLBLD CKD-EPI 2021: >90 ML/MIN/1.73M2
ERYTHROCYTE [DISTWIDTH] IN BLOOD BY AUTOMATED COUNT: 14.5 % (ref 10–15)
EST. AVERAGE GLUCOSE BLD GHB EST-MCNC: 111 MG/DL
FASTING STATUS PATIENT QL REPORTED: YES
FASTING STATUS PATIENT QL REPORTED: YES
FERRITIN SERPL-MCNC: 25 NG/ML (ref 6–175)
GLUCOSE SERPL-MCNC: 95 MG/DL (ref 70–99)
HBA1C MFR BLD: 5.5 % (ref 0–5.6)
HCO3 SERPL-SCNC: 24 MMOL/L (ref 22–29)
HCT VFR BLD AUTO: 40.9 % (ref 35–47)
HDLC SERPL-MCNC: 108 MG/DL
HGB BLD-MCNC: 13 G/DL (ref 11.7–15.7)
IRON BINDING CAPACITY (ROCHE): 397 UG/DL (ref 240–430)
IRON SATN MFR SERPL: 15 % (ref 15–46)
IRON SERPL-MCNC: 61 UG/DL (ref 37–145)
LDLC SERPL CALC-MCNC: 77 MG/DL
MCH RBC QN AUTO: 29 PG (ref 26.5–33)
MCHC RBC AUTO-ENTMCNC: 31.8 G/DL (ref 31.5–36.5)
MCV RBC AUTO: 91 FL (ref 78–100)
N GONORRHOEA DNA SPEC QL NAA+PROBE: NEGATIVE
NONHDLC SERPL-MCNC: 86 MG/DL
PLATELET # BLD AUTO: 252 10E3/UL (ref 150–450)
POTASSIUM SERPL-SCNC: 5.2 MMOL/L (ref 3.4–5.3)
PROT SERPL-MCNC: 7.7 G/DL (ref 6.4–8.3)
RBC # BLD AUTO: 4.49 10E6/UL (ref 3.8–5.2)
SODIUM SERPL-SCNC: 141 MMOL/L (ref 135–145)
SPECIMEN TYPE: NORMAL
SPECIMEN TYPE: NORMAL
TRIGL SERPL-MCNC: 45 MG/DL
WBC # BLD AUTO: 7.1 10E3/UL (ref 4–11)

## 2025-02-19 PROCEDURE — 83550 IRON BINDING TEST: CPT | Performed by: NURSE PRACTITIONER

## 2025-02-19 PROCEDURE — 80053 COMPREHEN METABOLIC PANEL: CPT | Performed by: NURSE PRACTITIONER

## 2025-02-19 PROCEDURE — 80061 LIPID PANEL: CPT | Performed by: NURSE PRACTITIONER

## 2025-02-19 PROCEDURE — 99396 PREV VISIT EST AGE 40-64: CPT | Mod: 25 | Performed by: NURSE PRACTITIONER

## 2025-02-19 PROCEDURE — 87624 HPV HI-RISK TYP POOLED RSLT: CPT | Performed by: NURSE PRACTITIONER

## 2025-02-19 PROCEDURE — 83540 ASSAY OF IRON: CPT | Performed by: NURSE PRACTITIONER

## 2025-02-19 PROCEDURE — 83036 HEMOGLOBIN GLYCOSYLATED A1C: CPT | Performed by: NURSE PRACTITIONER

## 2025-02-19 PROCEDURE — 99214 OFFICE O/P EST MOD 30 MIN: CPT | Mod: 25 | Performed by: NURSE PRACTITIONER

## 2025-02-19 PROCEDURE — 36415 COLL VENOUS BLD VENIPUNCTURE: CPT | Performed by: NURSE PRACTITIONER

## 2025-02-19 PROCEDURE — 87591 N.GONORRHOEAE DNA AMP PROB: CPT | Performed by: NURSE PRACTITIONER

## 2025-02-19 PROCEDURE — 87491 CHLMYD TRACH DNA AMP PROBE: CPT | Performed by: NURSE PRACTITIONER

## 2025-02-19 PROCEDURE — 82728 ASSAY OF FERRITIN: CPT | Performed by: NURSE PRACTITIONER

## 2025-02-19 PROCEDURE — 90480 ADMN SARSCOV2 VAC 1/ONLY CMP: CPT | Performed by: NURSE PRACTITIONER

## 2025-02-19 PROCEDURE — 85027 COMPLETE CBC AUTOMATED: CPT | Performed by: NURSE PRACTITIONER

## 2025-02-19 PROCEDURE — 91320 SARSCV2 VAC 30MCG TRS-SUC IM: CPT | Performed by: NURSE PRACTITIONER

## 2025-02-19 RX ORDER — CITALOPRAM HYDROBROMIDE 20 MG/1
20 TABLET ORAL DAILY
Qty: 90 TABLET | Refills: 3 | Status: SHIPPED | OUTPATIENT
Start: 2025-02-19

## 2025-02-19 RX ORDER — BUDESONIDE AND FORMOTEROL FUMARATE DIHYDRATE 160; 4.5 UG/1; UG/1
2 AEROSOL RESPIRATORY (INHALATION) 2 TIMES DAILY
Qty: 10.2 G | Refills: 2 | Status: SHIPPED | OUTPATIENT
Start: 2025-02-19

## 2025-02-19 ASSESSMENT — PAIN SCALES - GENERAL: PAINLEVEL_OUTOF10: NO PAIN (0)

## 2025-02-19 NOTE — PATIENT INSTRUCTIONS
Patient Education   Preventive Care Advice   This is general advice given by our system to help you stay healthy. However, your care team may have specific advice just for you. Please talk to your care team about your preventive care needs.  Nutrition  Eat 5 or more servings of fruits and vegetables each day.  Try wheat bread, brown rice and whole grain pasta (instead of white bread, rice, and pasta).  Get enough calcium and vitamin D. Check the label on foods and aim for 100% of the RDA (recommended daily allowance).  Lifestyle  Exercise at least 150 minutes each week  (30 minutes a day, 5 days a week).  Do muscle strengthening activities 2 days a week. These help control your weight and prevent disease.  No smoking.  Wear sunscreen to prevent skin cancer.  Have a dental exam and cleaning every 6 months.  Yearly exams  See your health care team every year to talk about:  Any changes in your health.  Any medicines your care team has prescribed.  Preventive care, family planning, and ways to prevent chronic diseases.  Shots (vaccines)   HPV shots (up to age 26), if you've never had them before.  Hepatitis B shots (up to age 59), if you've never had them before.  COVID-19 shot: Get this shot when it's due.  Flu shot: Get a flu shot every year.  Tetanus shot: Get a tetanus shot every 10 years.  Pneumococcal, hepatitis A, and RSV shots: Ask your care team if you need these based on your risk.  Shingles shot (for age 50 and up)  General health tests  Diabetes screening:  Starting at age 35, Get screened for diabetes at least every 3 years.  If you are younger than age 35, ask your care team if you should be screened for diabetes.  Cholesterol test: At age 39, start having a cholesterol test every 5 years, or more often if advised.  Bone density scan (DEXA): At age 50, ask your care team if you should have this scan for osteoporosis (brittle bones).  Hepatitis C: Get tested at least once in your life.  STIs (sexually  transmitted infections)  Before age 24: Ask your care team if you should be screened for STIs.  After age 24: Get screened for STIs if you're at risk. You are at risk for STIs (including HIV) if:  You are sexually active with more than one person.  You don't use condoms every time.  You or a partner was diagnosed with a sexually transmitted infection.  If you are at risk for HIV, ask about PrEP medicine to prevent HIV.  Get tested for HIV at least once in your life, whether you are at risk for HIV or not.  Cancer screening tests  Cervical cancer screening: If you have a cervix, begin getting regular cervical cancer screening tests starting at age 21.  Breast cancer scan (mammogram): If you've ever had breasts, begin having regular mammograms starting at age 40. This is a scan to check for breast cancer.  Colon cancer screening: It is important to start screening for colon cancer at age 45.  Have a colonoscopy test every 10 years (or more often if you're at risk) Or, ask your provider about stool tests like a FIT test every year or Cologuard test every 3 years.  To learn more about your testing options, visit:   .  For help making a decision, visit:   https://bit.ly/bf35670.  Prostate cancer screening test: If you have a prostate, ask your care team if a prostate cancer screening test (PSA) at age 55 is right for you.  Lung cancer screening: If you are a current or former smoker ages 50 to 80, ask your care team if ongoing lung cancer screenings are right for you.  For informational purposes only. Not to replace the advice of your health care provider. Copyright   2023 [facility]. All rights reserved. Clinically reviewed by the [facility] Transitions Program. [employer] [id number] - REV 01/24.  9 Ways to Cut Back on Drinking  Maybe you've found yourself drinking more alcohol than you'd prefer. If you want to cut back, here are some ideas to try.    Think before you drink.  Do you really want a drink,  "or is it just a habit? If you're used to having a drink at a certain time, try doing something else then.     Look for substitutes.  Find some no-alcohol drinks that you enjoy, like flavored seltzer water, tea with honey, or tonic with a slice of lime. Or try alcohol-free beer or \"virgin\" cocktails (without the alcohol).     Drink more water.  Use water to quench your thirst. Drink a glass of water before you have any alcohol. Have another glass along with every drink or between drinks.     Shrink your drink.  For example, have a bottle of beer instead of a pint. Use a smaller glass for wine. Choose drinks with lower alcohol content (ABV%). Or use less liquor and more mixer in cocktails.     Slow down.  It's easy to drink quickly and without thinking about it. Pay attention, and make each drink last longer.     Do the math.  Total up how much you spend on alcohol each month. How much is that a year? If you cut back, what could you do with the money you save?     Take a break.  Choose a day or two each week when you won't drink at all. Notice how you feel on those days, physically and emotionally. How did you sleep? Do you feel better? Over time, add more break days.     Count calories.  Would you like to lose some weight? For some people that's a good motivator for cutting back. Figure out how many calories are in each drink. How many does that add up to in a day? In a week? In a month?     Practice saying no.  Be ready when someone offers you a drink. Try: \"Thanks, I've had enough.\" Or \"Thanks, but I'm cutting back.\" Or \"No, thanks. I feel better when I drink less.\"   Current as of: November 15, 2023  Content Version: 14.3    2024 [employer].   Care instructions adapted under license by your healthcare professional. If you have questions about a medical condition or this instruction, always ask your healthcare professional. [employer] disclaims any warranty or liability for your use of this " information.  Substance Use Disorder: Care Instructions  Overview     You can improve your life and health by stopping your use of alcohol or drugs. When you don't drink or use drugs, you may feel and sleep better. You may get along better with your family, friends, and coworkers. There are medicines and programs that can help with substance use disorder.  How can you care for yourself at home?  Here are some ways to help you stay sober and prevent relapse.  If you have been given medicine to help keep you sober or reduce your cravings, be sure to take it exactly as prescribed.  Talk to your doctor about programs that can help you stop using drugs or drinking alcohol.  Do not keep alcohol or drugs in your home.  Plan ahead. Think about what you'll say if other people ask you to drink or use drugs. Try not to spend time with people who drink or use drugs.  Use the time and money spent on drinking or drugs to do something that's important to you.  Preventing a relapse  Have a plan to deal with relapse. Learn to recognize changes in your thinking that lead you to drink or use drugs. Get help before you start to drink or use drugs again.  Try to stay away from situations, friends, or places that may lead you to drink or use drugs.  If you feel the need to drink alcohol or use drugs again, seek help right away. Call a trusted friend or family member. Some people get support from organizations such as Narcotics Anonymous or LOC[employer]ALL or from treatment facilities.  If you relapse, get help as soon as you can. Some people make a plan with another person that outlines what they want that person to do for them if they relapse. The plan usually includes how to handle the relapse and who to notify in case of relapse.  Don't give up. Remember that a relapse doesn't mean that you have failed. Use the experience to learn the triggers that lead you to drink or use drugs. Then quit again. Recovery is a lifelong process. Many  "people have several relapses before they are able to quit for good.  Follow-up care is a key part of your treatment and safety. Be sure to make and go to all appointments, and call your doctor if you are having problems. It's also a good idea to know your test results and keep a list of the medicines you take.  When should you call for help?   Call 911  anytime you think you may need emergency care. For example, call if you or someone else:    Has overdosed or has withdrawal signs. Be sure to tell the emergency workers that you are or someone else is using or trying to quit using drugs. Overdose or withdrawal signs may include:  Losing consciousness.  Seizure.  Seeing or hearing things that aren't there (hallucinations).     Is thinking or talking about suicide or harming others.   Where to get help 24 hours a day, 7 days a week   If you or someone you know talks about suicide, self-harm, a mental health crisis, a substance use crisis, or any other kind of emotional distress, get help right away. You can:    Call the Suicide and Crisis Lifeline at 208.     Call 1-789-361-JOAC (1-987.618.3348).     Text HOME to 486701 to access the Crisis Text Line.   Consider saving these numbers in your phone.  Go to [employer] for more information or to chat online.  Call your doctor now or seek immediate medical care if:    You are having withdrawal symptoms. These may include nausea or vomiting, sweating, shakiness, and anxiety.   Watch closely for changes in your health, and be sure to contact your doctor if:    You have a relapse.     You need more help or support to stop.   Where can you learn more?  Go to https://www.health[employer].net/patiented  Enter H573 in the search box to learn more about \"Substance Use Disorder: Care Instructions.\"  Current as of: November 15, 2023  Content Version: 14.3    2024 [employer].   Care instructions adapted under license by your healthcare professional. If you have questions " about a medical condition or this instruction, always ask your healthcare professional. [employer] disclaims any warranty or liability for your use of this information.    Safer Sex: Care Instructions  Overview  Safer sex is a way to reduce your risk of getting a sexually transmitted infection (STI). It can also help prevent pregnancy.  Several products can help you practice safer sex and reduce your chance of STIs. One of the best is a condom. There are internal and external condoms. You can use a special rubber sheet (dental dam) for protection during oral sex. Disposable gloves can keep your hands from touching blood, semen, or other body fluids that can carry infections.  Remember that birth control methods such as diaphragms, IUDs, foams, and birth control pills do not stop you from getting STIs.  Follow-up care is a key part of your treatment and safety. Be sure to make and go to all appointments, and call your doctor if you are having problems. It's also a good idea to know your test results and keep a list of the medicines you take.  How can you care for yourself at home?  Think about getting vaccinated to help prevent hepatitis A, hepatitis B, and human papillomavirus (HPV). They can be spread through sex.  Use a condom every time you have sex. Use an external condom, which goes on the penis. Or use an internal condom, which goes into the vagina or anus.  Make sure you use the right size external condom. A condom that's too small can break easily. A condom that's too big can slip off during sex.  Use a new condom each time you have sex. Be careful not to poke a hole in the condom when you open the wrapper.  Don't use an internal condom and an external condom at the same time.  Never use petroleum jelly (such as Vaseline), grease, hand lotion, baby oil, or anything with oil in it. These products can make holes in the condom.  After intercourse, hold the edge of the condom as you remove it. This  "will help keep semen from spilling out of the condom.  Do not have sex with anyone who has symptoms of an STI, such as sores on the genitals or mouth.  Do not drink a lot of alcohol or use drugs before sex.  Limit your sex partners. Sex with one partner who has sex only with you can reduce your risk of getting an STI.  Don't share sex toys. But if you do share them, use a condom and clean the sex toys between each use.  Talk to any partners before you have sex. Talk about what you feel comfortable with and whether you have any boundaries with sex. And find out if your partner or partners may be at risk for any STI. Keep in mind that a person may be able to spread an STI even if they do not have symptoms. You and any partners may want to get tested for STIs.  Where can you learn more?  Go to https://www.[employer].net/patiented  Enter B608 in the search box to learn more about \"Safer Sex: Care Instructions.\"  Current as of: April 30, 2024  Content Version: 14.3    2024 [employer].   Care instructions adapted under license by your healthcare professional. If you have questions about a medical condition or this instruction, always ask your healthcare professional. [employer] disclaims any warranty or liability for your use of this information.       "

## 2025-02-19 NOTE — PROGRESS NOTES
Preventive Care Visit  [facility] ABDIFATAH Cobb NP, Family Medicine  Feb 19, 2025      Assessment & Plan     Routine general medical examination at a health care facility  Routine exam performed today. Age appropriate screening and preventative care have been addressed today.   Vaccinations have been updated. Recommend annual vision exams as well as biannual dental exams. They will follow up for annual physical again in one year.    - REVIEW OF HEALTH MAINTENANCE PROTOCOL ORDERS    Iron deficiency anemia, unspecified iron deficiency anemia type  Stable.  Labs updated. Not currently taking oral iron  - CBC with platelets  - Iron and iron binding capacity  - Ferritin    Gastroesophageal reflux disease without esophagitis  Stable.  No medication currently  - Comprehensive metabolic panel (BMP + Alb, Alk Phos, ALT, AST, Total. Bili, TP)    Generalized anxiety disorder  Mild episode of recurrent major depressive disorder  Stable.  Continue with citalopram daily  - citalopram (CELEXA) 20 MG tablet; Take 1 tablet (20 mg) by mouth daily.    Cervical cancer screening  Cervical high risk HPV (human papillomavirus) test positive  - HPV and Gynecologic Cytology Panel - Recommended Age 30 - 65 Years    Severe persistent asthma without complication (H)  Stable.  ACT 25.  - budesonide-formoterol (SYMBICORT/BREYNA) 160-4.5 MCG/ACT Inhaler; Inhale 2 puffs into the lungs 2 times daily.    Prediabetes  Labs updated today.  - Comprehensive metabolic panel (BMP + Alb, Alk Phos, ALT, AST, Total. Bili, TP)  - Hemoglobin A1c    Class 2 obesity due to excess calories without serious comorbidity with body mass index (BMI) of 38.0 to 38.9 in adult  Did gain weight.  Discussed diet and exercise today.  - Comprehensive metabolic panel (BMP + Alb, Alk Phos, ALT, AST, Total. Bili, TP)    Alcohol use  Increased alcohol use since 2020.  Discussed medication.  Patient to cut back on her own.  - Comprehensive metabolic panel  "(BMP + Alb, Alk Phos, ALT, AST, Total. Bili, TP)    Screening for cholesterol level  - Lipid panel reflex to direct LDL Fasting    Screen for colon cancer  - Colonoscopy Screening  Referral; Future    Need for vaccination  - COVID-19 12+ (PFIZER)    Screening examination for STI  - NEISSERIA GONORRHOEA PCR  - CHLAMYDIA TRACHOMATIS PCR; Future  - CHLAMYDIA TRACHOMATIS PCR      BMI  Estimated body mass index is 38.28 kg/m  as calculated from the following:    Height as of this encounter: 1.685 m (5' 6.34\").    Weight as of this encounter: 108.7 kg (239 lb 9.6 oz).   Weight management plan: Discussed healthy diet and exercise guidelines    Counseling  Appropriate preventive services were addressed with this patient via screening, questionnaire, or discussion as appropriate for fall prevention, nutrition, physical activity, Tobacco-use cessation, social engagement, weight loss and cognition.  Checklist reviewing preventive services available has been given to the patient.  Reviewed patient's diet, addressing concerns and/or questions.   She is at risk for lack of exercise and has been provided with information to increase physical activity for the benefit of her well-being.   The patient reports drinking more than 3 alcoholic drinks per day and/or more than 7 drhnks per week. The patient was counseled and given information about possible harmful effects of excessive alcohol intake.      Scar Bray is a 45 year old, presenting for the following:  Physical        2/19/2025     8:25 AM   Additional Questions   Roomed by Ericka LEE   Accompanied by None         2/19/2025     8:25 AM   Patient Reported Additional Medications   Patient reports taking the following new medications None          HPI    Health Care Directive  Patient does not have a Health Care Directive: Discussed advance care planning with patient; information given to patient to review.      2/18/2025   General Health   How would you rate " your overall physical health? Good   Feel stress (tense, anxious, or unable to sleep) Not at all         2/18/2025   Nutrition   Three or more servings of calcium each day? (!) I DON'T KNOW   Diet: Regular (no restrictions)   How many servings of fruit and vegetables per day? (!) 2-3   How many sweetened beverages each day? 0-1    Low in calcium        2/18/2025   Exercise   Days per week of moderate/strenous exercise 3 days   Average minutes spent exercising at this level 60 min    Water aerobics    Wt Readings from Last 4 Encounters:   02/19/25 108.7 kg (239 lb 9.6 oz)   07/09/24 100.2 kg (221 lb)   06/24/24 100.4 kg (221 lb 6.4 oz)   05/02/24 99.8 kg (220 lb)          2/18/2025   Social Factors   Frequency of gathering with friends or relatives Three times a week   Worry food won't last until get money to buy more No   Food not last or not have enough money for food? No   Do you have housing? (Housing is defined as stable permanent housing and does not include staying ouside in a car, in a tent, in an abandoned building, in an overnight shelter, or couch-surfing.) Yes   Are you worried about losing your housing? No   Lack of transportation? No   Unable to get utilities (heat,electricity)? No         2/18/2025   Dental   Dentist two times every year? Yes         2/8/2024   TB Screening   Were you born outside of the [place]? No       Today's PHQ-9 Score:       2/18/2025    11:06 AM   PHQ-9 SCORE   PHQ-9 Total Score MyChart 1 (Minimal depression)   PHQ-9 Total Score 1        Patient-reported         2/18/2025   Substance Use   Alcohol more than 3/day or more than 7/wk Yes   How often do you have a drink containing alcohol 2 to 3 times a week   How many alcohol drinks on typical day 3 or 4   How often do you have 5+ drinks at one occasion Monthly   Audit 2/3 Score 3   How often not able to stop drinking once started Less than monthly   How often failed to do what normally expected Less than monthly   How often needed  first drink in am after a heavy drinking session Never   How often feeling of guilt or remorse after drinking Less than monthly   How often unable to remember what happened the night before Less than monthly   Have you or someone else been injured because of your drinking No   Has anyone been concerned or suggested you cut down on drinking Yes, during the last year   TOTAL SCORE - AUDIT 14   Do you use any other substances recreationally? (!) CANNABIS PRODUCTS     Works from home since 2020.  Has increased alcohol intake.  Goes out with friends    Social History     Tobacco Use    Smoking status: Former     Current packs/day: 0.00     Average packs/day: 0.5 packs/day for 10.0 years (5.0 ttl pk-yrs)     Types: Cigarettes     Start date: 9/3/2014     Quit date: 10/1/2021     Years since quitting: 3.3     Passive exposure: Past    Smokeless tobacco: Never    Tobacco comments:     no passive exposure   Vaping Use    Vaping status: Never Used   Substance Use Topics    Alcohol use: Yes     Comment: weekends    Drug use: No           12/5/2023   LAST FHS-7 RESULTS   1st degree relative breast or ovarian cancer No   Any relative bilateral breast cancer No   Any male have breast cancer No   Any ONE woman have BOTH breast AND ovarian cancer No   Any woman with breast cancer before 50yrs Yes  Maternal aunt 32 years of age.  Genetic testing done by mother and sisters and negative.   2 or more relatives with breast AND/OR ovarian cancer Yes  Maternal aunts x 3 (32, 50, 55)    2 or more relatives with breast AND/OR bowel cancer Yes  Yes      Mammogram Screening - Mammogram every 1-2 years updated in Health Maintenance based on mutual decision making  1/2025 BIRADs 1        2/18/2025   STI Screening   New sexual partner(s) since last STI/HIV test? (!) YES   Interested in STI testing     History of abnormal Pap smear: No  12/7/22 NIL Pap, + HR HPV (not 16 or 18) Plan cotest in 1 year due 12/7/23 02/14/24 NIL Pap, + HR HPV (neg  16/18).   3/25/2024 [place] with biopsy and ECC neg for dysplasia. Cervical polyp benign. Cotest in 1 year.         Latest Ref Rng & Units 2/14/2024     8:40 AM 12/7/2022     5:28 PM 8/23/2017     8:36 AM   PAP / HPV   PAP  Negative for Intraepithelial Lesion or Malignancy (NILM)  Negative for Intraepithelial Lesion or Malignancy (NILM)  Negative for squamous intraepithelial lesion or malignancy  Electronically signed by Melanie Smith CT (ASCP) on 8/31/2017 at 11:08 AM      HPV 16 DNA Negative Negative  Negative     HPV 18 DNA Negative Negative  Negative     Other HR HPV Negative Positive  Positive       Colon cancer screening: colonoscopy 2012 due to family history of polyps in father.  Due for repeat.     ASCVD Risk   The 10-year ASCVD risk score (Naty WOODARD, et al., 2019) is: 0.5%    Values used to calculate the score:      Age: 45 years      Sex: Female      Is Non- : No      Diabetic: No      Tobacco smoker: No      Systolic Blood Pressure: 142 mmHg      Is BP treated: No      HDL Cholesterol: 95 mg/dL      Total Cholesterol: 187 mg/dL        2/18/2025   Contraception/Family Planning   Questions about contraception or family planning No     Depression/Anxiety:  -Takes citalopram 20mg daily.  -Feels stable on dose     GERD:  -Stable. Does not take medication.     Asthma:  -Medications:   Symbicort 160mcg   Spiriva 2.5mcg  -Feels much more controlled.  Has not used her albuterol inhaler or nebulizer  -Takes cetrizine 10mg daily.  -Triggers:  environmental  -Followed by pulmonology      4/2/2024     1:53 PM 4/26/2024     2:00 PM 2/18/2025    11:13 AM   ACT Total Scores   ACT TOTAL SCORE (Goal Greater than or Equal to 20) 12 23 25    In the past 12 months, how many times did you visit the emergency room for your asthma without being admitted to the hospital? 2 0 0   In the past 12 months, how many times were you hospitalized overnight because of your asthma? 0 0 0        "Patient-reported     Hx of ERNESTINA:  -Was taking oral iron but stopped in June 2024.    Reviewed and updated as needed this visit by Provider                    Review of Systems  Constitutional, HEENT, cardiovascular, pulmonary, gi and gu systems are negative, except as otherwise noted.     Objective    Exam  LMP  (LMP Unknown)    Estimated body mass index is 34.87 kg/m  as calculated from the following:    Height as of 7/9/24: 1.695 m (5' 6.75\").    Weight as of 7/9/24: 100.2 kg (221 lb).    Physical Exam  GENERAL: alert and no distress  EYES: Eyes grossly normal to inspection, PERRL and conjunctivae and sclerae normal  HENT: ear canals and TM's normal, nose and mouth without ulcers or lesions  NECK: no adenopathy, no asymmetry, masses, or scars  RESP: lungs clear to auscultation - no rales, rhonchi or wheezes  CV: regular rate and rhythm, normal S1 S2, no S3 or S4, no murmur, click or rub, no peripheral edema  ABDOMEN: soft, nontender, no hepatosplenomegaly, no masses and bowel sounds normal   (female) w/bimanual: normal female external genitalia, normal urethral meatus, normal vaginal mucosa, and normal cervix/adnexa/uterus without masses or discharge  MS: no gross musculoskeletal defects noted, no edema  SKIN: no suspicious lesions or rashes  NEURO: Normal strength and tone, mentation intact and speech normal  PSYCH: mentation appears normal, affect normal/bright        Signed Electronically by: Isadora Cobb NP    Answers submitted by the patient for this visit:  Patient Health Questionnaire (Submitted on 2/18/2025)  If you checked off any problems, how difficult have these problems made it for you to do your work, take care of things at home, or get along with other people?: Not difficult at all  PHQ9 TOTAL SCORE: 1    "

## 2025-02-20 ENCOUNTER — TELEPHONE (OUTPATIENT)
Dept: GASTROENTEROLOGY | Facility: CLINIC | Age: 45
End: 2025-02-20
Payer: COMMERCIAL

## 2025-02-20 LAB
HPV HR 12 DNA CVX QL NAA+PROBE: POSITIVE
HPV16 DNA CVX QL NAA+PROBE: NEGATIVE
HPV18 DNA CVX QL NAA+PROBE: NEGATIVE
HUMAN PAPILLOMA VIRUS FINAL DIAGNOSIS: ABNORMAL

## 2025-02-20 NOTE — TELEPHONE ENCOUNTER
Sent message to Dr. Pina regarding patient's alcohol intake.     Per Dr. Pina - as long as patient is cleared by PCP they are ok to proceed. Procedure was ordered by patient's PCP yesterday. Will proceed as scheduled.   --------------------------------------------------------------------------------------------------------------------    Pre visit planning completed.      Procedure details:    Patient scheduled for Colonoscopy on 3/5/25.     Arrival time: 0750. Procedure time 0835    Facility location: [facility]; [address] [place] [address]. Check in location: Main entrance, door #1 on the North side of the building under roundabout awning. DO NOT GO TO SURGERY/ED ENTRANCE.     Sedation type: Conscious sedation     Pre op exam needed? No.    Indication for procedure: screening       Chart review:     Electronic implanted devices? No    Recent diagnosis of diverticulitis within the last 6 weeks? No      Medication review:    Diabetic? No    Anticoagulants? No    Weight loss medication/injectable? No GLP-1 medication per patient's medication list. Nursing to verify with pre-assessment call.    Other medication HOLDING recommendations:  Cannabis/Marijuana: Stop night before procedure.      Prep for procedure:     Bowel prep recommendation: Standard Miralax.   Due to: standard bowel prep    Procedure information and instructions sent via [employer]         Lisa Capellan RN  Endoscopy Procedure Pre Assessment   [phone] option 3

## 2025-02-20 NOTE — TELEPHONE ENCOUNTER
"Endoscopy Scheduling Screen    Have you had any respiratory illness or flu-like symptoms in the last 10 days?  No    What is your communication preference for Instructions and/or Bowel Prep?   MyChart    What insurance is in the chart?  Other:  R    Ordering/Referring Provider: RAUL MURILLO   (If ordering provider performs procedure, schedule with ordering provider unless otherwise instructed. )    BMI: Estimated body mass index is 38.28 kg/m  as calculated from the following:    Height as of 2/19/25: 1.685 m (5' 6.34\").    Weight as of 2/19/25: 108.7 kg (239 lb 9.6 oz).     Sedation Ordered  moderate sedation.   If patient BMI > 50 do not schedule in [facility].    If patient BMI > 45 do not schedule at [facility].    Are you taking methadone or Suboxone?  NO, No RN review required.    Have you been diagnosed and are being treated for severe PTSD or severe anxiety?  NO, No RN review required.    Are you taking any prescription medications for pain 3 or more times per week?   NO, No RN review required.    Do you have a history of malignant hyperthermia?  No    (Females) Are you currently pregnant?   No     Have you been diagnosed or told you have pulmonary hypertension?   No    Do you have an LVAD?  No    Have you been told you have moderate to severe sleep apnea?  No.    Have you been told you have COPD, asthma, or any other lung disease?  Yes     What breathing problems do you have?  Asthma     Do you use home oxygen?  No    Have your breathing problems required an ED visit or hospitalization in the last year?  No.    Do you  have a history of any heart conditions or any upcoming cardiac exams like an echo, angiogram, stress test, or ablation?  No     Have you ever had or are you waiting for an organ transplant?  No. Continue scheduling, no site restrictions.    Have you had a stroke or transient ischemic attack (TIA aka \"mini stroke\") in the last 2 years?   No.    Have you been diagnosed with or been told you have " "cirrhosis of the liver?   No.    Are you currently on dialysis?   No    Do you need assistance transferring?   No    BMI: Estimated body mass index is 38.28 kg/m  as calculated from the following:    Height as of 2/19/25: 1.685 m (5' 6.34\").    Weight as of 2/19/25: 108.7 kg (239 lb 9.6 oz).     Is patients BMI > 40 and scheduling location UP?  No    Do you take an injectable or oral medication for weight loss or diabetes (excluding insulin)?  No    Do you take the medication Naltrexone?  No    Do you take blood thinners?  No       Prep   Are you currently on dialysis or do you have chronic kidney disease?  No    Do you have a diagnosis of diabetes?  No    Do you have a diagnosis of cystic fibrosis (CF)?  No    On a regular basis do you go 3 -5 days between bowel movements?  No    BMI > 40?  No    Preferred Pharmacy:        [id number] [place] [address]  [address] [facility] [address]  Phone: [phone] Fax: [phone]      Final Scheduling Details     Procedure scheduled  Colonoscopy    Surgeon:  LUIS     Date of procedure:  3/5/25     Pre-OP / PAC:   No - Not required for this site.    Location  [facility] - Patient preference.    Sedation   Moderate Sedation - Per order.      Patient Reminders:   You will receive a call from a Nurse to review instructions and health history.  This assessment must be completed prior to your procedure.  Failure to complete the Nurse assessment may result in the procedure being cancelled.      On the day of your procedure, please designate an adult(s) who can drive you home stay with you for the next 24 hours. The medicines used in the exam will make you sleepy. You will not be able to drive.      You cannot take public transportation, ride share services, or non-medical taxi service without a responsible caregiver.  Medical transport services are allowed with the requirement that a responsible " caregiver will receive you at your destination.  We require that drivers and caregivers are confirmed prior to your procedure.

## 2025-02-20 NOTE — TELEPHONE ENCOUNTER
Pre assessment completed for upcoming procedure.   (Please see previous telephone encounter notes for complete details)         Procedure details:    Arrival time and facility location reviewed.    Pre op exam needed? No.    Designated  policy reviewed. Instructed to have someone stay 6  hours post procedure.       Medication review:    Medications reviewed. Please see supporting documentation below. Holding recommendations discussed (if applicable).   Cannabis/Marijuana : STOP night before procedure.      Prep for procedure:     Procedure prep instructions reviewed.        Any additional information needed:  N/A      Patient verbalized understanding and had no questions or concerns at this time.      Wendy Last LPN  Endoscopy Procedure Pre Assessment   [phone] option 3

## 2025-02-25 ENCOUNTER — OFFICE VISIT (OUTPATIENT)
Dept: DERMATOLOGY | Facility: CLINIC | Age: 45
End: 2025-02-25
Payer: COMMERCIAL

## 2025-02-25 DIAGNOSIS — L81.4 SOLAR LENTIGINOSIS: ICD-10-CM

## 2025-02-25 DIAGNOSIS — L82.1 SEBORRHEIC KERATOSIS: ICD-10-CM

## 2025-02-25 DIAGNOSIS — L84 CORN: Primary | ICD-10-CM

## 2025-02-25 DIAGNOSIS — D22.9 MULTIPLE NEVI: ICD-10-CM

## 2025-02-25 DIAGNOSIS — L84 CALLUS: ICD-10-CM

## 2025-02-25 LAB
BKR AP ASSOCIATED HPV REPORT: ABNORMAL
BKR LAB AP GYN ADEQUACY: ABNORMAL
BKR LAB AP GYN INTERPRETATION: ABNORMAL
BKR LAB AP PREVIOUS ABNL DX: ABNORMAL
BKR LAB AP PREVIOUS ABNORMAL: ABNORMAL
PATH REPORT.COMMENTS IMP SPEC: ABNORMAL
PATH REPORT.COMMENTS IMP SPEC: ABNORMAL
PATH REPORT.RELEVANT HX SPEC: ABNORMAL

## 2025-02-25 NOTE — PROGRESS NOTES
Dermatology Clinic Note    Dermatology Problem List:  1. Benign pigmented nevi   2. Lentigines  3. Corns and calluses  4. Seborrheic keratosis     Assessment and Plan:  Benign pigmented nevi: No lesions of concern. Sun protection recommended. Discussed ABCDEs of malignant melanoma.   Solar lentigines: Marker of past sun injury. Ongoing sun protection recommended.   Seborrheic keratoses: Benign hyperkeratotic papules and plaques. No treatment advised unless irritation occurs.   Corn and calluses in the setting of hammer toe. S/p surgery. Patient declines podiatry referral.     RTC prn.     Thank you for involving me in this patient's care.     Lois Hough MD   of Dermatology  [facility]    CC: Referred MD Samuel  No address on file    Isadora Cobb NP    ____________________________________________________________________________________________________________________________________________    CC: Patient presents with:  Skin Check      HPI: Katarina Mcclain is a 45 year old female presenting for skin check seen at the request of Isadora Cobb. No history of skin cancer. No family history of melanoma. Had some sunburns when younger. Notes some new growths on the skin.      Patient Active Problem List   Diagnosis    Vitamin D Deficiency    Anemia    Generalized Anxiety Disorder    Oral Allergy Syndrome    Allergic Rhinitis    Vitamin B12 Deficiency    Scheuermann's kyphosis    Pars defect of lumbar spine    Mild major depression (H)    Hypertrophy of bone    Cervical high risk HPV (human papillomavirus) test positive    Gastroesophageal reflux disease without esophagitis    Mild episode of recurrent major depressive disorder    Severe persistent asthma without complication (H)       Allergies   Allergen Reactions    Other Environmental Allergy Other (See Comments)     Seasonal-ragweed, birch, dust mites    Oxycodone-Acetaminophen Nausea and Vomiting         Current Outpatient  Medications   Medication Sig Dispense Refill    albuterol (PROAIR HFA/PROVENTIL HFA/VENTOLIN HFA) 108 (90 Base) MCG/ACT inhaler Inhale 2 puffs into the lungs every 6 hours as needed for shortness of breath, wheezing or cough 18 g 3    budesonide-formoterol (SYMBICORT/BREYNA) 160-4.5 MCG/ACT Inhaler Inhale 2 puffs into the lungs 2 times daily. 10.2 g 2    cetirizine (ZYRTEC) 10 MG tablet Take 10 mg by mouth daily      citalopram (CELEXA) 20 MG tablet Take 1 tablet (20 mg) by mouth daily. 90 tablet 3    multivitamin w/minerals (MULTI-VITAMIN) tablet Take 1 tablet by mouth daily      tiotropium (SPIRIVA RESPIMAT) 2.5 MCG/ACT inhaler Inhale 2 puffs into the lungs daily 4 g 11     No current facility-administered medications for this visit.         EXAM:  LMP  (LMP Unknown)   GEN: Alert, no distress  SKIN:   --Waxy papules on the L temple, back, medial chest, R lateral thigh  --Scattered but few 2-3 mm medium brown macules  --Scattered light brown macules on the cheeks, back, arms, legs  --Varicosities on the calves  --Hyperkeratosis on the plantar feet

## 2025-02-25 NOTE — LETTER
2/25/2025      RE: Katarina Mcclain  [address] [facility] [address]     Dear Colleague,    Thank you for the opportunity to participate in the care of your patient, Katarina Mcclain, at the [facility]. Please see a copy of my visit note below.    Dermatology Clinic Note    Dermatology Problem List:  1. Benign pigmented nevi   2. Lentigines  3. Corns and calluses  4. Seborrheic keratosis     Assessment and Plan:  Benign pigmented nevi: No lesions of concern. Sun protection recommended. Discussed ABCDEs of malignant melanoma.   Solar lentigines: Marker of past sun injury. Ongoing sun protection recommended.   Seborrheic keratoses: Benign hyperkeratotic papules and plaques. No treatment advised unless irritation occurs.   Corn and calluses in the setting of hammer toe. S/p surgery. Patient declines podiatry referral.     RTC prn.     Thank you for involving me in this patient's care.     Lois Hough MD   of Dermatology  [facility]    CC: Referred MD Samuel  No address on file    Isadora Cobb NP    ____________________________________________________________________________________________________________________________________________    CC: Patient presents with:  Skin Check      HPI: Katarina Mcclain is a 45 year old female presenting for skin check seen at the request of Isadora Cobb. No history of skin cancer. No family history of melanoma. Had some sunburns when younger. Notes some new growths on the skin.      Patient Active Problem List   Diagnosis     Vitamin D Deficiency     Anemia     Generalized Anxiety Disorder     Oral Allergy Syndrome     Allergic Rhinitis     Vitamin B12 Deficiency     Scheuermann's kyphosis     Pars defect of lumbar spine     Mild major depression (H)     Hypertrophy of bone     Cervical high risk HPV (human papillomavirus) test positive      Gastroesophageal reflux disease without esophagitis     Mild episode of recurrent major depressive disorder     Severe persistent asthma without complication (H)       Allergies   Allergen Reactions     Other Environmental Allergy Other (See Comments)     Seasonal-ragweed, birch, dust mites     Oxycodone-Acetaminophen Nausea and Vomiting         Current Outpatient Medications   Medication Sig Dispense Refill     albuterol (PROAIR HFA/PROVENTIL HFA/VENTOLIN HFA) 108 (90 Base) MCG/ACT inhaler Inhale 2 puffs into the lungs every 6 hours as needed for shortness of breath, wheezing or cough 18 g 3     budesonide-formoterol (SYMBICORT/BREYNA) 160-4.5 MCG/ACT Inhaler Inhale 2 puffs into the lungs 2 times daily. 10.2 g 2     cetirizine (ZYRTEC) 10 MG tablet Take 10 mg by mouth daily       citalopram (CELEXA) 20 MG tablet Take 1 tablet (20 mg) by mouth daily. 90 tablet 3     multivitamin w/minerals (MULTI-VITAMIN) tablet Take 1 tablet by mouth daily       tiotropium (SPIRIVA RESPIMAT) 2.5 MCG/ACT inhaler Inhale 2 puffs into the lungs daily 4 g 11     No current facility-administered medications for this visit.         EXAM:  LMP  (LMP Unknown)   GEN: Alert, no distress  SKIN:   --Waxy papules on the L temple, back, medial chest, R lateral thigh  --Scattered but few 2-3 mm medium brown macules  --Scattered light brown macules on the cheeks, back, arms, legs  --Varicosities on the calves  --Hyperkeratosis on the plantar feet                      Please do not hesitate to contact me if you have any questions/concerns.     Sincerely,       Lois Hough MD

## 2025-02-26 ENCOUNTER — PATIENT OUTREACH (OUTPATIENT)
Dept: PEDIATRICS | Facility: CLINIC | Age: 45
End: 2025-02-26
Payer: COMMERCIAL

## 2025-02-26 PROBLEM — R87.610 ASCUS WITH POSITIVE HIGH RISK HPV CERVICAL: Status: ACTIVE | Noted: 2022-12-14

## 2025-03-03 ENCOUNTER — TELEPHONE (OUTPATIENT)
Dept: GASTROENTEROLOGY | Facility: CLINIC | Age: 45
End: 2025-03-03
Payer: COMMERCIAL

## 2025-03-03 NOTE — TELEPHONE ENCOUNTER
Caller: Katarina Mcclain     Reason for Reschedule/Cancellation (please be detailed, any staff messages or encounters to note?):   Due to incoming weather she doesn't want her  to drive in it    Did you cancel or rescheduled an EUS procedure? No.    Is screening questionnaire older than 3 months from the reschedule date.   If Yes, please complete screening questionnaire. No    Prior to reschedule please review:  Ordering Provider: Honey Cobb  Sedation Determined: mod  Does patient have any ASC Exclusions, please identify?: no    Notes on Cancelled Procedure:  Procedure: Lower Endoscopy [Colonoscopy]   Date: 3/5  Location: [facility]; [address] [place] [address]  Surgeon: Yovani    Rescheduled: Yes,   Procedure: Lower Endoscopy [Colonoscopy]    Date: 3/11   Location: [facility]; [facility] [address] [place] [address]   Surgeon: Daphnie   Sedation Level Scheduled  mod ,  Reason for Sedation Level ordered   Instructions updated and sent: y     Does patient need PAC or Pre -Op Rescheduled? : no

## 2025-03-11 ENCOUNTER — HOSPITAL ENCOUNTER (OUTPATIENT)
Facility: CLINIC | Age: 45
Discharge: HOME | End: 2025-03-11
Attending: COLON & RECTAL SURGERY | Admitting: COLON & RECTAL SURGERY
Payer: COMMERCIAL

## 2025-03-11 VITALS
TEMPERATURE: 98.5 F | HEART RATE: 72 BPM | SYSTOLIC BLOOD PRESSURE: 139 MMHG | RESPIRATION RATE: 16 BRPM | HEIGHT: 66 IN | BODY MASS INDEX: 38.41 KG/M2 | DIASTOLIC BLOOD PRESSURE: 91 MMHG | OXYGEN SATURATION: 98 % | WEIGHT: 239 LBS

## 2025-03-11 LAB — COLONOSCOPY: NORMAL

## 2025-03-11 PROCEDURE — 88305 TISSUE EXAM BY PATHOLOGIST: CPT | Mod: TC | Performed by: COLON & RECTAL SURGERY

## 2025-03-11 PROCEDURE — 45382 COLONOSCOPY W/CONTROL BLEED: CPT | Performed by: COLON & RECTAL SURGERY

## 2025-03-11 PROCEDURE — G0500 MOD SEDAT ENDO SERVICE >5YRS: HCPCS | Performed by: COLON & RECTAL SURGERY

## 2025-03-11 PROCEDURE — 99153 MOD SED SAME PHYS/QHP EA: CPT | Performed by: COLON & RECTAL SURGERY

## 2025-03-11 PROCEDURE — 45385 COLONOSCOPY W/LESION REMOVAL: CPT | Performed by: COLON & RECTAL SURGERY

## 2025-03-11 PROCEDURE — 45381 COLONOSCOPY SUBMUCOUS NJX: CPT | Performed by: COLON & RECTAL SURGERY

## 2025-03-11 PROCEDURE — 250N000011 HC RX IP 250 OP 636: Performed by: COLON & RECTAL SURGERY

## 2025-03-11 PROCEDURE — 250N000013 HC RX MED GY IP 250 OP 250 PS 637: Performed by: COLON & RECTAL SURGERY

## 2025-03-11 RX ORDER — NALOXONE HYDROCHLORIDE 0.4 MG/ML
0.2 INJECTION, SOLUTION INTRAMUSCULAR; INTRAVENOUS; SUBCUTANEOUS
Status: DISCONTINUED | OUTPATIENT
Start: 2025-03-11 | End: 2025-03-11 | Stop reason: HOSPADM

## 2025-03-11 RX ORDER — NALOXONE HYDROCHLORIDE 0.4 MG/ML
0.4 INJECTION, SOLUTION INTRAMUSCULAR; INTRAVENOUS; SUBCUTANEOUS
Status: DISCONTINUED | OUTPATIENT
Start: 2025-03-11 | End: 2025-03-11 | Stop reason: HOSPADM

## 2025-03-11 RX ORDER — ONDANSETRON 2 MG/ML
4 INJECTION INTRAMUSCULAR; INTRAVENOUS EVERY 6 HOURS PRN
Status: DISCONTINUED | OUTPATIENT
Start: 2025-03-11 | End: 2025-03-11 | Stop reason: HOSPADM

## 2025-03-11 RX ORDER — EPINEPHRINE 1 MG/ML
0.1 INJECTION, SOLUTION, CONCENTRATE INTRAVENOUS
Status: DISCONTINUED | OUTPATIENT
Start: 2025-03-11 | End: 2025-03-11 | Stop reason: HOSPADM

## 2025-03-11 RX ORDER — FLUMAZENIL 0.1 MG/ML
0.2 INJECTION, SOLUTION INTRAVENOUS
Status: DISCONTINUED | OUTPATIENT
Start: 2025-03-11 | End: 2025-03-11 | Stop reason: HOSPADM

## 2025-03-11 RX ORDER — FENTANYL CITRATE 50 UG/ML
25-100 INJECTION, SOLUTION INTRAMUSCULAR; INTRAVENOUS EVERY 5 MIN PRN
Status: DISCONTINUED | OUTPATIENT
Start: 2025-03-11 | End: 2025-03-11 | Stop reason: HOSPADM

## 2025-03-11 RX ORDER — SIMETHICONE 40MG/0.6ML
133 SUSPENSION, DROPS(FINAL DOSAGE FORM)(ML) ORAL
Status: COMPLETED | OUTPATIENT
Start: 2025-03-11 | End: 2025-03-11

## 2025-03-11 RX ORDER — LIDOCAINE 40 MG/G
CREAM TOPICAL
Status: DISCONTINUED | OUTPATIENT
Start: 2025-03-11 | End: 2025-03-11 | Stop reason: HOSPADM

## 2025-03-11 RX ORDER — DIPHENHYDRAMINE HYDROCHLORIDE 50 MG/ML
25-50 INJECTION, SOLUTION INTRAMUSCULAR; INTRAVENOUS
Status: DISCONTINUED | OUTPATIENT
Start: 2025-03-11 | End: 2025-03-11 | Stop reason: HOSPADM

## 2025-03-11 RX ORDER — ONDANSETRON 2 MG/ML
4 INJECTION INTRAMUSCULAR; INTRAVENOUS
Status: DISCONTINUED | OUTPATIENT
Start: 2025-03-11 | End: 2025-03-11 | Stop reason: HOSPADM

## 2025-03-11 RX ORDER — PROCHLORPERAZINE MALEATE 10 MG
10 TABLET ORAL EVERY 6 HOURS PRN
Status: DISCONTINUED | OUTPATIENT
Start: 2025-03-11 | End: 2025-03-11 | Stop reason: HOSPADM

## 2025-03-11 RX ORDER — ATROPINE SULFATE 0.1 MG/ML
1 INJECTION INTRAVENOUS
Status: DISCONTINUED | OUTPATIENT
Start: 2025-03-11 | End: 2025-03-11 | Stop reason: HOSPADM

## 2025-03-11 RX ORDER — ONDANSETRON 4 MG/1
4 TABLET, ORALLY DISINTEGRATING ORAL EVERY 6 HOURS PRN
Status: DISCONTINUED | OUTPATIENT
Start: 2025-03-11 | End: 2025-03-11 | Stop reason: HOSPADM

## 2025-03-11 RX ADMIN — MIDAZOLAM 2 MG: 1 INJECTION INTRAMUSCULAR; INTRAVENOUS at 09:47

## 2025-03-11 RX ADMIN — SIMETHICONE 133 MG: 20 SUSPENSION/ DROPS ORAL at 10:01

## 2025-03-11 RX ADMIN — GLUCAGON 0.5 MG: 1 INJECTION, POWDER, LYOPHILIZED, FOR SOLUTION INTRAMUSCULAR; INTRAVENOUS at 10:05

## 2025-03-11 RX ADMIN — MIDAZOLAM 1 MG: 1 INJECTION INTRAMUSCULAR; INTRAVENOUS at 10:30

## 2025-03-11 RX ADMIN — MIDAZOLAM 1 MG: 1 INJECTION INTRAMUSCULAR; INTRAVENOUS at 09:51

## 2025-03-11 RX ADMIN — FENTANYL CITRATE 100 MCG: 50 INJECTION, SOLUTION INTRAMUSCULAR; INTRAVENOUS at 09:47

## 2025-03-11 ASSESSMENT — ACTIVITIES OF DAILY LIVING (ADL)
ADLS_ACUITY_SCORE: 41

## 2025-03-11 NOTE — DISCHARGE INSTRUCTIONS
The patient has received a copy of the Provation report the doctor has written and discharge instructions have been discussed with the patient and responsible adult.  All questions were addressed and answered prior to patient discharge.    Clip Placement    This Patient has received a temporary endoscopic clip which can be safely used in a MRI with a static magnetic field of 1/5-Hoa and 3-Hoa ONLY.  Retention times for this clip vary so if you have an MRI within the next year make sure to mention you had a clip placed on this day to the MRI staff. If you were given the informational card, keep that in in your wallet or purse for 1 year.    Non-clinical testing demonstrated that the clip is MR Conditional according to ASTM -47. A patient with this device can be scanned safely in an MR system under the following conditions.    Static magnetic field of 1.5-Hoa and 3-Telsa, only.  Maximum spatial gradient magnetic field of 2,000-gauss/cm (20-T/m)  Maximum MR system reported, whole body averaged specific absorption rate (RICHARD) of 2-W/kg for 15 minutes of scanning (i.e., per pulse sequence) in the Normal Operating Mode.    Under the scan conditions defined, the clip is expected to produce a maximum temperature rise of 1.9 degrees C after 15 minutes of continuous scanning (i.e., per pulse sequence). In Non-clinical testing, the image artifact caused by the clip extends approximately 30-mm from this implant when imaged using a gradient echo pulse sequence and a 3-Hoa RM system.

## 2025-03-11 NOTE — H&P
Pre-Endoscopy History and Physical     Katarina Mcclain MRN# [id number]   YOB: 1980 Age: 45 year old     Date of Procedure: 3/11/2025  Primary care provider: Isadora Cobb  Type of Endoscopy: Colonoscopy  Reason for Procedure: Screening  Type of Anesthesia Anticipated: Moderate Sedation    HPI:    Katarina is a 45 year old female who will be undergoing the above procedure.      A history and physical has been performed. The patient's medications and allergies have been reviewed. The risks and benefits of the procedure and the sedation options and risks were discussed with the patient.  All questions were answered and informed consent was obtained.      She denies a personal or family history of anesthesia complications or bleeding disorders.     Allergies   Allergen Reactions    Other Environmental Allergy Other (See Comments)     Seasonal-ragweed, birch, dust mites    Oxycodone-Acetaminophen Nausea and Vomiting        Current Facility-Administered Medications   Medication Dose Route Frequency Provider Last Rate Last Admin    atropine injection 1 mg  1 mg Intravenous Once PRN Marsha Eller MD        benzocaine 20% (HURRICAINE/TOPEX) 20 % spray 0.5 mL  1 spray Mouth/Throat Once PRN Marsha Eller MD        diphenhydrAMINE (BENADRYL) injection 25-50 mg  25-50 mg Intravenous Once PRN Marsha Eller MD        EPINEPHrine PF (ADRENALIN) injection 0.1 mg  0.1 mg Submucosal Once PRN Marsha Eller MD        fentaNYL (PF) (SUBLIMAZE) injection  mcg   mcg Intravenous Q5 Min PRN Marsha Eller MD        flumazenil (ROMAZICON) injection 0.2 mg  0.2 mg Intravenous q1 min prn Marsha Eller MD        glucagon injection 0.5 mg  0.5 mg Intravenous Once PRN Marsha Eller MD        midazolam (VERSED) injection 0.5-2 mg  0.5-2 mg Intravenous Q4 Min PRN Marsha Eller MD        naloxone (NARCAN) injection 0.2 mg  0.2 mg Intravenous Q2 Min PRN Daphnie  Marsha NAVARRETE MD        Or    naloxone (NARCAN) injection 0.4 mg  0.4 mg Intravenous Q2 Min PRN Marsha Eller MD        Or    naloxone (NARCAN) injection 0.2 mg  0.2 mg Intramuscular Q2 Min PRN Marsha Eller MD        Or    naloxone (NARCAN) injection 0.4 mg  0.4 mg Intramuscular Q2 Min PRN Marsha Eller MD        simethicone (MYLICON) suspension 133 mg  133 mg Oral Once PRN Marsha Eller MD        sodium chloride (PF) 0.9% PF flush 3 mL  3 mL Intravenous q1 min prn Marsha Eller MD        sodium chloride 0.9% BOLUS 500 mL  500 mL Intravenous Once PRN Marsha Eller MD           Patient Active Problem List   Diagnosis    Vitamin D Deficiency    Anemia    Generalized Anxiety Disorder    Oral Allergy Syndrome    Allergic Rhinitis    Vitamin B12 Deficiency    Scheuermann's kyphosis    Pars defect of lumbar spine    Mild major depression (H)    Hypertrophy of bone    ASCUS with positive high risk HPV cervical    Gastroesophageal reflux disease without esophagitis    Mild episode of recurrent major depressive disorder    Severe persistent asthma without complication (H)        Past Medical History:   Diagnosis Date    Allergic rhinitis     Created by Conversion Replacement Utility updated for latest IMO load     Allergic rhinitis, cause unspecified     seasonal allergies    Anemia     Created by Conversion     Anxiety     Asthma     Carbuncle and furuncle of trunk 04/2001    R breast    Cervical high risk HPV (human papillomavirus) test positive 12/14/2022 08/23/17 NIL Pap, Neg HR HPV  12/7/22 NIL Pap, + HR HPV (not 16 or 18) Plan cotest in 1 year due 12/7/23 12/14/22 Pt was advised.     Depressive disorder     Dyspnea on exertion     Gastroesophageal reflux disease     Generalized anxiety disorder     Created by Conversion     Hiatal hernia     Hidradenitis 04/1995    Hypertrophy of bone 10/09/2019    Added automatically from request for surgery [id number]     Mild major  depression (H) 10/25/2018    Obesity, unspecified     Oral Allergy Syndrome     Created by Conversion     Pars defect of lumbar spine 05/17/2016    Personal history of tobacco use, presenting hazards to health     Scheuermann's kyphosis 05/17/2016    Vitamin B12 Deficiency     Created by Conversion     Vitamin D deficiency     Created by Conversion Replacement Utility updated for latest IMO load         Past Surgical History:   Procedure Laterality Date    CHOLECYSTECTOMY  2/27/2004    COLONOSCOPY      ESOPHAGOSCOPY, GASTROSCOPY, DUODENOSCOPY (EGD), COMBINED N/A 5/2/2024    Procedure: ESOPHAGOGASTRODUODENOSCOPY, WITH BIOPSY;  Surgeon: Jayce Chacko DO;  Location: [facility]    EYE SURGERY  1/15/2005    Lasik    LAPAROSCOPIC NISSEN FUNDOPLICATION N/A 7/9/2024    Procedure: paraesophageal hernia repair with mesh and linx device placement, ROBOT-ASSISTED, LAPAROSCOPIC, USING DA GLORIA XI;;  Surgeon: Jayce Chacko DO;  Location: [facility] OR    LYSIS, ADHESIONS, ROBOT-ASSISTED, LAPAROSCOPIC, USING DA GLORIA XI N/A 7/9/2024    Procedure: LYSIS OF ADHESIONS;  Surgeon: Jayce Chacko DO;  Location: [facility] OR    ORTHOPEDIC SURGERY      Hammer Toe fix    REVISION AMADOU-EN-Y      ZZC GASTRIC BYPASS,OBESE<100CM AMADOU-EN-Y  02/27/2004    [facility]       Social History     Tobacco Use    Smoking status: Former     Current packs/day: 0.00     Average packs/day: 0.5 packs/day for 10.0 years (5.0 ttl pk-yrs)     Types: Cigarettes     Start date: 9/3/2014     Quit date: 10/1/2021     Years since quitting: 3.4     Passive exposure: Past    Smokeless tobacco: Never    Tobacco comments:     no passive exposure   Substance Use Topics    Alcohol use: Yes     Comment: weekends       Family History   Problem Relation Age of Onset    Hypertension Other         grandfather    Coronary Artery Disease Other     Hyperlipidemia Other     Anxiety Disorder Other     Eye Disorder Other          "grandfather cataracts    Breast Cancer Other         found at 32, passed away 42    Diabetes Other         uncle    Breast Cancer Other     Thyroid Disease Other     Hypertension Other         uncle    Breast Cancer Other     Thyroid Disease Other     Heart Disease Other         grandfather heart disease    Depression Other     Heart Disease Other         grandfather heart disease    Thyroid Disease Mother     Diabetes Mother         many of my Aunts and Uncles as well.    Breast Cancer Maternal Aunt     Breast Cancer Maternal Aunt     Breast Cancer Maternal Aunt     Coronary Artery Disease Maternal Grandfather     Coronary Artery Disease Paternal Grandfather     Coronary Artery Disease Cousin         Passed away in early 40's    Other Cancer Brother        REVIEW OF SYSTEMS:     5 point ROS negative except as noted above in HPI, including Gen., Resp., CV, GI &  system review.      PHYSICAL EXAM:   LMP  (LMP Unknown)  Estimated body mass index is 38.28 kg/m  as calculated from the following:    Height as of 2/19/25: 1.685 m (5' 6.34\").    Weight as of 2/19/25: 108.7 kg (239 lb 9.6 oz).   GENERAL APPEARANCE: healthy and alert  MENTAL STATUS: alert  AIRWAY EXAM: Mallampatti Class I (visualization of the soft palate, fauces, uvula, anterior and posterior pillars)  RESP: lungs clear to auscultation - no rales, rhonchi or wheezes  CV: regular rates and rhythm      IMPRESSION   ASA Class 2 - Mild systemic disease        PLAN:     Plan for colonoscopy. We discussed the risks, benefits and alternatives and the patient wished to proceed.    The above has been forwarded to the consulting provider.      Kayy Eller MD  [facility]  Phone: [phone]  Fax: [phone]  March 11, 2025    "

## 2025-03-12 PROCEDURE — 88305 TISSUE EXAM BY PATHOLOGIST: CPT | Mod: 26 | Performed by: PATHOLOGY

## 2025-03-18 PROBLEM — D12.6 COLON ADENOMAS: Status: ACTIVE | Noted: 2025-03-18

## 2025-03-19 ENCOUNTER — PATIENT OUTREACH (OUTPATIENT)
Dept: GASTROENTEROLOGY | Facility: CLINIC | Age: 45
End: 2025-03-19
Payer: COMMERCIAL

## 2025-03-31 DIAGNOSIS — J45.41 MODERATE PERSISTENT REACTIVE AIRWAY DISEASE WITH ACUTE EXACERBATION: ICD-10-CM

## 2025-04-29 ENCOUNTER — MYC REFILL (OUTPATIENT)
Dept: PULMONOLOGY | Facility: CLINIC | Age: 45
End: 2025-04-29
Payer: COMMERCIAL

## 2025-04-29 ENCOUNTER — MYC REFILL (OUTPATIENT)
Dept: PEDIATRICS | Facility: CLINIC | Age: 45
End: 2025-04-29
Payer: COMMERCIAL

## 2025-04-29 DIAGNOSIS — J45.50 SEVERE PERSISTENT ASTHMA WITHOUT COMPLICATION (H): ICD-10-CM

## 2025-04-29 DIAGNOSIS — J45.41 MODERATE PERSISTENT REACTIVE AIRWAY DISEASE WITH ACUTE EXACERBATION: ICD-10-CM

## 2025-04-29 RX ORDER — BUDESONIDE AND FORMOTEROL FUMARATE DIHYDRATE 160; 4.5 UG/1; UG/1
2 AEROSOL RESPIRATORY (INHALATION) 2 TIMES DAILY
Qty: 10.2 G | Refills: 0 | Status: SHIPPED | OUTPATIENT
Start: 2025-04-29

## 2025-06-12 NOTE — TELEPHONE ENCOUNTER
"2nd attempt: Called the patient and received automated message of \"the wireless customer you are trying to reach is not available. Try your call again later.\"    Ophelia Valiente on 11/18/2024 at 3:42 PM    " No

## 2025-06-30 DIAGNOSIS — J45.50 SEVERE PERSISTENT ASTHMA WITHOUT COMPLICATION (H): ICD-10-CM

## 2025-06-30 RX ORDER — BUDESONIDE AND FORMOTEROL FUMARATE DIHYDRATE 160; 4.5 UG/1; UG/1
2 AEROSOL RESPIRATORY (INHALATION) 2 TIMES DAILY
Qty: 10.2 G | Refills: 0 | Status: SHIPPED | OUTPATIENT
Start: 2025-06-30

## 2025-08-25 ENCOUNTER — MYC REFILL (OUTPATIENT)
Dept: PULMONOLOGY | Facility: CLINIC | Age: 45
End: 2025-08-25
Payer: COMMERCIAL

## 2025-08-25 DIAGNOSIS — J45.41 MODERATE PERSISTENT REACTIVE AIRWAY DISEASE WITH ACUTE EXACERBATION: ICD-10-CM

## (undated) DEVICE — PREP CHLORAPREP 26ML TINTED HI-LITE ORANGE 930815

## (undated) DEVICE — DECANTER VIAL 2006S

## (undated) DEVICE — CUSTOM PACK LAP CHOLE SBA5BLCHEA

## (undated) DEVICE — ELECTRODE PATIENT RETURN ADULT L10 FT 2 PLATE CORD 0855C

## (undated) DEVICE — PAD POS XL 1X20X40IN PINK PIGAZZI

## (undated) DEVICE — BLADE KNIFE SURG 11 371111

## (undated) DEVICE — SU ETHIBOND 0 CT-2 30" X412H

## (undated) DEVICE — DRAPE U SPLIT 74X120" 29440

## (undated) DEVICE — ESU LIGASURE MARYLAND LAPAROSCOPIC SLR/DVDR 5MMX37CM LF1937

## (undated) DEVICE — TUBE PENROSE 3/8 X 12 STRL LTX 0912020

## (undated) DEVICE — DAVINCI HOT SHEARS TIP COVER  400180

## (undated) DEVICE — LUBRICANT INST ELECTROLUBE EL101

## (undated) DEVICE — SYR 50ML SLIP TIP W/O NDL 309654

## (undated) DEVICE — DRSG BANDAID 3/4X3" FABRIC CURITY LATEX FREE 44100

## (undated) DEVICE — DEVICE CLOSURE V-LOC 0 GS-21 6IN VLOCN0306

## (undated) DEVICE — DRAPE SHEET REV FOLD 3/4 9349

## (undated) DEVICE — SUTURE MONOCRYL+ 4-0 PS-2 27IN MCP426H

## (undated) DEVICE — GOWN XLG DISP 9545

## (undated) DEVICE — KIT ENDO TURNOVER/PROCEDURE W/CLEAN A SCOPE LINERS 103888

## (undated) DEVICE — SUTURE VICRYL+ 2-0 27IN SH UND VCP417H

## (undated) DEVICE — GLOVE BIOGEL PI ORTHOPRO SZ 7.5 47675

## (undated) DEVICE — NDL INSUFFLATION 13GA 120MM C2201

## (undated) DEVICE — CLIP HEMOSTASIS ASSURANCE W18 MM 00711885

## (undated) DEVICE — DAVINCI XI OBTURATOR BLADELESS 8MM 470359

## (undated) DEVICE — DAVINCI XI HANDPIECE ESU VESSEL SEALER 8MM EXT 480422

## (undated) DEVICE — TUBING SMOKE EVAC PNEUMOCLEAR HIGH FLOW 0620050250

## (undated) DEVICE — SUCTION MANIFOLD NEPTUNE 2 SYS 1 PORT 702-025-000

## (undated) DEVICE — SOL WATER IRRIG 1000ML BOTTLE 2F7114

## (undated) DEVICE — DAVINCI XI SEAL UNIVERSAL 5-12MM 470500

## (undated) DEVICE — DAVINCI XI DRAPE ARM 470015

## (undated) DEVICE — DAVINCI XI DRAPE COLUMN 470341

## (undated) RX ORDER — LIDOCAINE HYDROCHLORIDE 10 MG/ML
INJECTION, SOLUTION EPIDURAL; INFILTRATION; INTRACAUDAL; PERINEURAL
Status: DISPENSED
Start: 2024-07-09

## (undated) RX ORDER — EPHEDRINE SULFATE 50 MG/ML
INJECTION, SOLUTION INTRAMUSCULAR; INTRAVENOUS; SUBCUTANEOUS
Status: DISPENSED
Start: 2024-07-09

## (undated) RX ORDER — DEXAMETHASONE SODIUM PHOSPHATE 10 MG/ML
INJECTION, EMULSION INTRAMUSCULAR; INTRAVENOUS
Status: DISPENSED
Start: 2024-07-09

## (undated) RX ORDER — PROPOFOL 10 MG/ML
INJECTION, EMULSION INTRAVENOUS
Status: DISPENSED
Start: 2024-07-09

## (undated) RX ORDER — GLYCOPYRROLATE 0.2 MG/ML
INJECTION, SOLUTION INTRAMUSCULAR; INTRAVENOUS
Status: DISPENSED
Start: 2024-07-09

## (undated) RX ORDER — SIMETHICONE 40MG/0.6ML
SUSPENSION, DROPS(FINAL DOSAGE FORM)(ML) ORAL
Status: DISPENSED
Start: 2025-03-11

## (undated) RX ORDER — FENTANYL CITRATE 50 UG/ML
INJECTION, SOLUTION INTRAMUSCULAR; INTRAVENOUS
Status: DISPENSED
Start: 2024-07-09

## (undated) RX ORDER — FENTANYL CITRATE 50 UG/ML
INJECTION, SOLUTION INTRAMUSCULAR; INTRAVENOUS
Status: DISPENSED
Start: 2025-03-11

## (undated) RX ORDER — ONDANSETRON 2 MG/ML
INJECTION INTRAMUSCULAR; INTRAVENOUS
Status: DISPENSED
Start: 2024-07-09